# Patient Record
Sex: MALE | Race: WHITE | NOT HISPANIC OR LATINO | URBAN - METROPOLITAN AREA
[De-identification: names, ages, dates, MRNs, and addresses within clinical notes are randomized per-mention and may not be internally consistent; named-entity substitution may affect disease eponyms.]

---

## 2024-03-10 ENCOUNTER — APPOINTMENT (OUTPATIENT)
Dept: RADIOLOGY | Facility: HOSPITAL | Age: 79
DRG: 280 | End: 2024-03-10
Payer: MEDICARE

## 2024-03-10 ENCOUNTER — APPOINTMENT (OUTPATIENT)
Dept: CARDIOLOGY | Facility: HOSPITAL | Age: 79
DRG: 280 | End: 2024-03-10
Payer: MEDICARE

## 2024-03-10 ENCOUNTER — HOSPITAL ENCOUNTER (INPATIENT)
Facility: HOSPITAL | Age: 79
LOS: 2 days | Discharge: HOME | DRG: 280 | End: 2024-03-13
Attending: EMERGENCY MEDICINE | Admitting: INTERNAL MEDICINE
Payer: MEDICARE

## 2024-03-10 DIAGNOSIS — I21.4 NON-STEMI (NON-ST ELEVATED MYOCARDIAL INFARCTION) (MULTI): ICD-10-CM

## 2024-03-10 DIAGNOSIS — I50.43 CHF (CONGESTIVE HEART FAILURE), NYHA CLASS I, ACUTE ON CHRONIC, COMBINED (MULTI): Primary | ICD-10-CM

## 2024-03-10 DIAGNOSIS — I11.0 HEART FAILURE DUE TO HIGH BLOOD PRESSURE (MULTI): ICD-10-CM

## 2024-03-10 DIAGNOSIS — N18.4 CHRONIC RENAL IMPAIRMENT, STAGE 4 (SEVERE) (MULTI): ICD-10-CM

## 2024-03-10 DIAGNOSIS — I50.9 ACUTE HEART FAILURE, UNSPECIFIED HEART FAILURE TYPE (MULTI): ICD-10-CM

## 2024-03-10 DIAGNOSIS — I50.20 UNSPECIFIED SYSTOLIC (CONGESTIVE) HEART FAILURE (MULTI): ICD-10-CM

## 2024-03-10 DIAGNOSIS — N18.9 CHRONIC RENAL IMPAIRMENT, UNSPECIFIED CKD STAGE: ICD-10-CM

## 2024-03-10 DIAGNOSIS — I16.0 HYPERTENSIVE URGENCY: ICD-10-CM

## 2024-03-10 DIAGNOSIS — J18.9 PNEUMONIA DUE TO INFECTIOUS ORGANISM, UNSPECIFIED LATERALITY, UNSPECIFIED PART OF LUNG: ICD-10-CM

## 2024-03-10 LAB
ALBUMIN SERPL BCP-MCNC: 4.3 G/DL (ref 3.4–5)
ALP SERPL-CCNC: 41 U/L (ref 33–136)
ALT SERPL W P-5'-P-CCNC: 18 U/L (ref 10–52)
ANION GAP SERPL CALC-SCNC: 20 MMOL/L (ref 10–20)
APPEARANCE UR: CLEAR
AST SERPL W P-5'-P-CCNC: 23 U/L (ref 9–39)
BACTERIA #/AREA URNS AUTO: ABNORMAL /HPF
BASOPHILS # BLD AUTO: 0.03 X10*3/UL (ref 0–0.1)
BASOPHILS NFR BLD AUTO: 0.3 %
BILIRUB SERPL-MCNC: 0.4 MG/DL (ref 0–1.2)
BILIRUB UR STRIP.AUTO-MCNC: NEGATIVE MG/DL
BNP SERPL-MCNC: 2432 PG/ML (ref 0–99)
BUN SERPL-MCNC: 62 MG/DL (ref 6–23)
CALCIUM SERPL-MCNC: 8.6 MG/DL (ref 8.6–10.3)
CARDIAC TROPONIN I PNL SERPL HS: 3071 NG/L (ref 0–20)
CARDIAC TROPONIN I PNL SERPL HS: 3111 NG/L (ref 0–20)
CHLORIDE SERPL-SCNC: 100 MMOL/L (ref 98–107)
CHLORIDE UR-SCNC: 97 MMOL/L
CHLORIDE/CREATININE (MMOL/G) IN URINE: 326 MMOL/G CREAT (ref 23–275)
CO2 SERPL-SCNC: 19 MMOL/L (ref 21–32)
COLOR UR: ABNORMAL
CREAT SERPL-MCNC: 3.35 MG/DL (ref 0.5–1.3)
CREAT UR-MCNC: 29.8 MG/DL (ref 20–370)
EGFRCR SERPLBLD CKD-EPI 2021: 18 ML/MIN/1.73M*2
EOSINOPHIL # BLD AUTO: 0.13 X10*3/UL (ref 0–0.4)
EOSINOPHIL NFR BLD AUTO: 1.3 %
ERYTHROCYTE [DISTWIDTH] IN BLOOD BY AUTOMATED COUNT: 14 % (ref 11.5–14.5)
GLUCOSE SERPL-MCNC: 124 MG/DL (ref 74–99)
GLUCOSE UR STRIP.AUTO-MCNC: NEGATIVE MG/DL
HCT VFR BLD AUTO: 39.8 % (ref 41–52)
HGB BLD-MCNC: 12.7 G/DL (ref 13.5–17.5)
IMM GRANULOCYTES # BLD AUTO: 0.03 X10*3/UL (ref 0–0.5)
IMM GRANULOCYTES NFR BLD AUTO: 0.3 % (ref 0–0.9)
KETONES UR STRIP.AUTO-MCNC: NEGATIVE MG/DL
LACTATE SERPL-SCNC: 1.9 MMOL/L (ref 0.4–2)
LEUKOCYTE ESTERASE UR QL STRIP.AUTO: NEGATIVE
LYMPHOCYTES # BLD AUTO: 1.05 X10*3/UL (ref 0.8–3)
LYMPHOCYTES NFR BLD AUTO: 10.3 %
MCH RBC QN AUTO: 28.5 PG (ref 26–34)
MCHC RBC AUTO-ENTMCNC: 31.9 G/DL (ref 32–36)
MCV RBC AUTO: 89 FL (ref 80–100)
MONOCYTES # BLD AUTO: 0.61 X10*3/UL (ref 0.05–0.8)
MONOCYTES NFR BLD AUTO: 6 %
MUCOUS THREADS #/AREA URNS AUTO: ABNORMAL /LPF
NEUTROPHILS # BLD AUTO: 8.3 X10*3/UL (ref 1.6–5.5)
NEUTROPHILS NFR BLD AUTO: 81.8 %
NITRITE UR QL STRIP.AUTO: NEGATIVE
NRBC BLD-RTO: 0 /100 WBCS (ref 0–0)
PH UR STRIP.AUTO: 6 [PH]
PLATELET # BLD AUTO: 194 X10*3/UL (ref 150–450)
POTASSIUM SERPL-SCNC: 4.5 MMOL/L (ref 3.5–5.3)
POTASSIUM UR-SCNC: 29 MMOL/L
POTASSIUM/CREAT UR-RTO: 97 MMOL/G CREAT
PROT SERPL-MCNC: 7.9 G/DL (ref 6.4–8.2)
PROT UR STRIP.AUTO-MCNC: ABNORMAL MG/DL
RBC # BLD AUTO: 4.46 X10*6/UL (ref 4.5–5.9)
RBC # UR STRIP.AUTO: ABNORMAL /UL
RBC #/AREA URNS AUTO: ABNORMAL /HPF
SODIUM SERPL-SCNC: 134 MMOL/L (ref 136–145)
SODIUM UR-SCNC: 83 MMOL/L
SODIUM/CREAT UR-RTO: 279 MMOL/G CREAT
SP GR UR STRIP.AUTO: 1.01
UROBILINOGEN UR STRIP.AUTO-MCNC: <2 MG/DL
WBC # BLD AUTO: 10.2 X10*3/UL (ref 4.4–11.3)
WBC #/AREA URNS AUTO: ABNORMAL /HPF

## 2024-03-10 PROCEDURE — 84300 ASSAY OF URINE SODIUM: CPT | Performed by: EMERGENCY MEDICINE

## 2024-03-10 PROCEDURE — 76770 US EXAM ABDO BACK WALL COMP: CPT

## 2024-03-10 PROCEDURE — 99291 CRITICAL CARE FIRST HOUR: CPT | Performed by: EMERGENCY MEDICINE

## 2024-03-10 PROCEDURE — 2500000004 HC RX 250 GENERAL PHARMACY W/ HCPCS (ALT 636 FOR OP/ED): Performed by: EMERGENCY MEDICINE

## 2024-03-10 PROCEDURE — 81001 URINALYSIS AUTO W/SCOPE: CPT | Performed by: EMERGENCY MEDICINE

## 2024-03-10 PROCEDURE — 76770 US EXAM ABDO BACK WALL COMP: CPT | Performed by: RADIOLOGY

## 2024-03-10 PROCEDURE — 71046 X-RAY EXAM CHEST 2 VIEWS: CPT | Performed by: RADIOLOGY

## 2024-03-10 PROCEDURE — 2500000001 HC RX 250 WO HCPCS SELF ADMINISTERED DRUGS (ALT 637 FOR MEDICARE OP): Performed by: INTERNAL MEDICINE

## 2024-03-10 PROCEDURE — 96367 TX/PROPH/DG ADDL SEQ IV INF: CPT

## 2024-03-10 PROCEDURE — G0378 HOSPITAL OBSERVATION PER HR: HCPCS

## 2024-03-10 PROCEDURE — 83880 ASSAY OF NATRIURETIC PEPTIDE: CPT | Performed by: STUDENT IN AN ORGANIZED HEALTH CARE EDUCATION/TRAINING PROGRAM

## 2024-03-10 PROCEDURE — 93005 ELECTROCARDIOGRAM TRACING: CPT

## 2024-03-10 PROCEDURE — 2500000004 HC RX 250 GENERAL PHARMACY W/ HCPCS (ALT 636 FOR OP/ED): Performed by: INTERNAL MEDICINE

## 2024-03-10 PROCEDURE — 71046 X-RAY EXAM CHEST 2 VIEWS: CPT

## 2024-03-10 PROCEDURE — 85025 COMPLETE CBC W/AUTO DIFF WBC: CPT | Performed by: STUDENT IN AN ORGANIZED HEALTH CARE EDUCATION/TRAINING PROGRAM

## 2024-03-10 PROCEDURE — 71250 CT THORAX DX C-: CPT | Performed by: RADIOLOGY

## 2024-03-10 PROCEDURE — 71250 CT THORAX DX C-: CPT

## 2024-03-10 PROCEDURE — 36415 COLL VENOUS BLD VENIPUNCTURE: CPT | Performed by: STUDENT IN AN ORGANIZED HEALTH CARE EDUCATION/TRAINING PROGRAM

## 2024-03-10 PROCEDURE — 80053 COMPREHEN METABOLIC PANEL: CPT | Performed by: STUDENT IN AN ORGANIZED HEALTH CARE EDUCATION/TRAINING PROGRAM

## 2024-03-10 PROCEDURE — 96361 HYDRATE IV INFUSION ADD-ON: CPT

## 2024-03-10 PROCEDURE — 84484 ASSAY OF TROPONIN QUANT: CPT | Performed by: STUDENT IN AN ORGANIZED HEALTH CARE EDUCATION/TRAINING PROGRAM

## 2024-03-10 PROCEDURE — 96365 THER/PROPH/DIAG IV INF INIT: CPT

## 2024-03-10 PROCEDURE — 83605 ASSAY OF LACTIC ACID: CPT | Performed by: EMERGENCY MEDICINE

## 2024-03-10 PROCEDURE — 87040 BLOOD CULTURE FOR BACTERIA: CPT | Mod: AHULAB | Performed by: EMERGENCY MEDICINE

## 2024-03-10 PROCEDURE — 36415 COLL VENOUS BLD VENIPUNCTURE: CPT | Performed by: EMERGENCY MEDICINE

## 2024-03-10 PROCEDURE — 84484 ASSAY OF TROPONIN QUANT: CPT | Performed by: EMERGENCY MEDICINE

## 2024-03-10 PROCEDURE — 96372 THER/PROPH/DIAG INJ SC/IM: CPT

## 2024-03-10 PROCEDURE — 96375 TX/PRO/DX INJ NEW DRUG ADDON: CPT

## 2024-03-10 PROCEDURE — 2500000001 HC RX 250 WO HCPCS SELF ADMINISTERED DRUGS (ALT 637 FOR MEDICARE OP): Performed by: EMERGENCY MEDICINE

## 2024-03-10 RX ORDER — POLYETHYLENE GLYCOL 3350 17 G/17G
17 POWDER, FOR SOLUTION ORAL DAILY
Status: DISCONTINUED | OUTPATIENT
Start: 2024-03-10 | End: 2024-03-13 | Stop reason: HOSPADM

## 2024-03-10 RX ORDER — FUROSEMIDE 10 MG/ML
20 INJECTION INTRAMUSCULAR; INTRAVENOUS ONCE
Status: COMPLETED | OUTPATIENT
Start: 2024-03-10 | End: 2024-03-10

## 2024-03-10 RX ORDER — ENOXAPARIN SODIUM 100 MG/ML
30 INJECTION SUBCUTANEOUS EVERY 24 HOURS
Status: DISCONTINUED | OUTPATIENT
Start: 2024-03-10 | End: 2024-03-13 | Stop reason: HOSPADM

## 2024-03-10 RX ORDER — HYDRALAZINE HYDROCHLORIDE 20 MG/ML
5 INJECTION INTRAMUSCULAR; INTRAVENOUS EVERY 4 HOURS PRN
Status: DISCONTINUED | OUTPATIENT
Start: 2024-03-10 | End: 2024-03-13 | Stop reason: HOSPADM

## 2024-03-10 RX ORDER — NAPROXEN SODIUM 220 MG/1
81 TABLET, FILM COATED ORAL DAILY
Status: DISCONTINUED | OUTPATIENT
Start: 2024-03-10 | End: 2024-03-13 | Stop reason: HOSPADM

## 2024-03-10 RX ORDER — SODIUM CHLORIDE 9 MG/ML
25 INJECTION, SOLUTION INTRAVENOUS CONTINUOUS
Status: DISCONTINUED | OUTPATIENT
Start: 2024-03-10 | End: 2024-03-11

## 2024-03-10 RX ADMIN — HYDRALAZINE HYDROCHLORIDE 5 MG: 20 INJECTION INTRAMUSCULAR; INTRAVENOUS at 16:12

## 2024-03-10 RX ADMIN — ENOXAPARIN SODIUM 30 MG: 30 INJECTION SUBCUTANEOUS at 16:40

## 2024-03-10 RX ADMIN — ASPIRIN 81 MG CHEWABLE TABLET 81 MG: 81 TABLET CHEWABLE at 18:11

## 2024-03-10 RX ADMIN — CEFTRIAXONE 2 G: 2 INJECTION, POWDER, FOR SOLUTION INTRAMUSCULAR; INTRAVENOUS at 16:40

## 2024-03-10 RX ADMIN — AZITHROMYCIN DIHYDRATE 500 MG: 500 INJECTION, POWDER, LYOPHILIZED, FOR SOLUTION INTRAVENOUS at 17:15

## 2024-03-10 RX ADMIN — SODIUM CHLORIDE 25 ML/HR: 9 INJECTION, SOLUTION INTRAVENOUS at 16:40

## 2024-03-10 RX ADMIN — NITROGLYCERIN 0.5 INCH: 20 OINTMENT TOPICAL at 14:13

## 2024-03-10 RX ADMIN — FUROSEMIDE 20 MG: 10 INJECTION, SOLUTION INTRAMUSCULAR; INTRAVENOUS at 14:13

## 2024-03-10 ASSESSMENT — COGNITIVE AND FUNCTIONAL STATUS - GENERAL
MOVING TO AND FROM BED TO CHAIR: A LITTLE
STANDING UP FROM CHAIR USING ARMS: A LITTLE
WALKING IN HOSPITAL ROOM: A LITTLE
MOBILITY SCORE: 19
DRESSING REGULAR LOWER BODY CLOTHING: A LITTLE
PERSONAL GROOMING: A LITTLE
HELP NEEDED FOR BATHING: A LITTLE
DAILY ACTIVITIY SCORE: 18
DRESSING REGULAR UPPER BODY CLOTHING: A LITTLE
EATING MEALS: A LITTLE
TURNING FROM BACK TO SIDE WHILE IN FLAT BAD: A LITTLE
TOILETING: A LITTLE
CLIMB 3 TO 5 STEPS WITH RAILING: A LITTLE

## 2024-03-10 ASSESSMENT — PAIN SCALES - GENERAL
PAINLEVEL_OUTOF10: 0 - NO PAIN
PAINLEVEL_OUTOF10: 3

## 2024-03-10 ASSESSMENT — PAIN DESCRIPTION - PAIN TYPE: TYPE: CHRONIC PAIN

## 2024-03-10 ASSESSMENT — PAIN - FUNCTIONAL ASSESSMENT: PAIN_FUNCTIONAL_ASSESSMENT: 0-10

## 2024-03-10 ASSESSMENT — COLUMBIA-SUICIDE SEVERITY RATING SCALE - C-SSRS
2. HAVE YOU ACTUALLY HAD ANY THOUGHTS OF KILLING YOURSELF?: NO
6. HAVE YOU EVER DONE ANYTHING, STARTED TO DO ANYTHING, OR PREPARED TO DO ANYTHING TO END YOUR LIFE?: NO
1. IN THE PAST MONTH, HAVE YOU WISHED YOU WERE DEAD OR WISHED YOU COULD GO TO SLEEP AND NOT WAKE UP?: NO

## 2024-03-10 ASSESSMENT — PAIN DESCRIPTION - LOCATION: LOCATION: KNEE

## 2024-03-10 ASSESSMENT — PAIN DESCRIPTION - ORIENTATION: ORIENTATION: LEFT

## 2024-03-10 NOTE — ED PROVIDER NOTES
HPI   Chief Complaint   Patient presents with    Hypertension    Shortness of Breath       HPI: 78-year-old male arrives with exertional shortness of breath it has been worsening over the last week he denies chest pain jaw pain arm pain he has had a mild cough.  Chest x-ray is read as a right basilar possible infiltrate.  With the tachycardia and mild cough I added septic orders and antibiotics this was after his confirmed heart failure and elevated troponin.  I also involved cardiology and nephrology because of his elevated troponin and renal status.  Both have been kind enough to accept these consults.  Patient seems to be resting comfortably but does get short of breath with any exertion.  I see that the admitting physicians have ordered a CAT scan he does have renal insufficiency but that result is not available upon his admission to the hospital.      PMH: Heart failure hypertension noncompliant with medication coronary artery disease metabolic syndrome    SH never used tobacco social alcohol  FH negative for both heart disease Diabetes  ROS  General Appears in distress  HEENT: No sore throat, No Visual Loss, No Headache, No Ear Pain  Neck: Denies neck pain  Chest: No chest pain, no pleuritic pain, no chest wall injury  Pulmonary: Positive exertional shortness of breath and mild cough, No Sputum production, No Wheezing  GI: No abdominal pain, no nausea or vomiting, no diarrhea.  : No dysuria, no frequency, no hematuria.  Extremities: No musculoskeletal pain, normal ambulation, no paresthesia.  Psych: Normal interaction, no anxiety, no depression, no suicidal ideation  Skin: No rashes    ROS is otherwise negative    PE: General: Appears in distress        HEENT: Throat is moist without exudate, midline uvula dentate intact, Tms clear with normal anatomy.        Neck: Supple non tender        Chest bilateral lower lobe Rales good AE, no wheezing, positive bilateral lower lobe rales, no rhonci positive JVD         CVA: RRR S1S2 no S3S4 or murmur        ABD: W/S/NT no HSM, no pulsatile masses, good bowel sounds        Extremities: Excellent distal pulses, brisk capillary refill. Full ROM        Psych: Normal interactions with no signs of depression  or suicidal ideation.        Neuro: Alert and oriented, moves all and feels all.    MDM:78-year-old male arrives with exertional shortness of breath it has been worsening over the last week he denies chest pain jaw pain arm pain he has had a mild cough.  Chest x-ray is read as a right basilar possible infiltrate.  With the tachycardia and mild cough I added septic orders and antibiotics this was after his confirmed heart failure and elevated troponin.  I also involved cardiology and nephrology because of his elevated troponin and renal status.  Both have been kind enough to accept these consults.  Patient seems to be resting comfortably but does get short of breath with any exertion.  I see that the admitting physicians have ordered a CAT scan he does have renal insufficiency but that result is not available upon his admission to the hospital.    EKG read by me reviewed by me sinus tachycardia occasional PACs good R wave progression no demonstratable ST segment elevation.                            [place] Coma Scale Score: 15                     Patient History   Past Medical History:   Diagnosis Date    Hypertension     Occlusion and stenosis of unspecified carotid artery     Carotid atherosclerosis    Other conditions influencing health status     Coronary Artery Disease    Personal history of other diseases of the circulatory system     History of congestive heart disease    Personal history of other diseases of the circulatory system     History of hypertension    Personal history of other endocrine, nutritional and metabolic disease     History of hyperlipidemia     History reviewed. No pertinent surgical history.  No family history on file.  Social History     Tobacco Use     Smoking status: Never    Smokeless tobacco: Never   Substance Use Topics    Alcohol use: Not on file    Drug use: Not on file       Physical Exam   ED Triage Vitals [03/10/24 1322]   Temperature Heart Rate Respirations BP   36.5 °C (97.7 °F) (!) 115 20 (!) 207/114      Pulse Ox Temp src Heart Rate Source Patient Position   95 % -- -- --      BP Location FiO2 (%)     -- --       Physical Exam    ED Course & MDM   Diagnoses as of 03/12/24 0828   Non-STEMI (non-ST elevated myocardial infarction) (CMS/[facility])   Acute heart failure, unspecified heart failure type (CMS/[facility])   Chronic renal impairment, unspecified CKD stage   Hypertensive urgency   Pneumonia due to infectious organism, unspecified laterality, unspecified part of lung   CHF (congestive heart failure), NYHA class I, acute on chronic, combined (CMS/[facility])       Medical Decision Making      Procedure  Critical Care    Performed by: Cruz Durham MD  Authorized by: Cruz Durham MD    Critical care provider statement:     Critical care time (minutes):  40    Critical care was necessary to treat or prevent imminent or life-threatening deterioration of the following conditions:  Cardiac failure, renal failure and sepsis    Critical care was time spent personally by me on the following activities:  Blood draw for specimens, development of treatment plan with patient or surrogate, discussions with consultants, discussions with primary provider, evaluation of patient's response to treatment, examination of patient, ordering and performing treatments and interventions, ordering and review of laboratory studies, ordering and review of radiographic studies, pulse oximetry, re-evaluation of patient's condition and review of old charts    Care discussed with: admitting provider         Cruz Durham MD  03/10/24 1627       Cruz Durham MD  03/10/24 1627       Cruz Durham MD  03/12/24 0830

## 2024-03-10 NOTE — Clinical Note
900cc clear yellow fluid removed during right thoracentesis.  Uneventful.  Pt tolerated procedure well.  Report called to floor nurse.  Labs sent.

## 2024-03-10 NOTE — ED TRIAGE NOTES
PT PRESENTS TO THE ED FOR HIGH BLOOD PRESSURE AND SOB WITH MOVEMENT. PT STATES THAT HE WAS TOLD ABOUT 20 YEARS AGO HE HAD HTN BUT HAS NOT FOLLOWED UP WITH ANYONE SINCE. PT STATES THAT HE DOES NOT TAKE ORAL MEDICATIONS THAT ARE PRESCRIBED RATHER SUPPLEMENTS. PT ENDORSES THAT HE TAKES 325MG ASPIRIN DAILY. PT STATES THAT THIS HAS BEEN GOING ON FOR TWO WEEKS NOW. PT STATES THAT HE THOUGHT IT WAS JUST A BUG BUT IT HAS HOT GOTTEN BETTER. PT DENIES CHEST PAIN. PT DENIES FEVERS OR CHILLS. PT DENIES HEADACHES.

## 2024-03-10 NOTE — CONSULTS
"Reason For Consult  IVIS on top of CKD,  Hypertensive urgency.    History Of Present Illness  Tom Haas is a 78 y.o. male presenting with high blood pressure readings and shortness of breath with movement.  Patient had hypertension for more than 20 years but has not follow-up with any primary care physician similarly had problems with his heart \"\" angina or MI nonspecific again with no cardiology follow-up.  Patient denies family history of kidney disease, or nephrolithiasis.  Chest x-ray and emergency room was positive for right basilar possible infiltrate.  No history of hematuria noted by the patient .  Patient had extremely high blood pressure readings above 180 mmHg and emergency department.  And being worked up as hypertensive urgency, with endorgan damage  Past Medical History  He has a past medical history of Hypertension, Occlusion and stenosis of unspecified carotid artery, Other conditions influencing health status, Personal history of other diseases of the circulatory system, Personal history of other diseases of the circulatory system, and Personal history of other endocrine, nutritional and metabolic disease.    Surgical History  He has no past surgical history on file.     Social History  He reports that he has never smoked. He has never used smokeless tobacco. No history on file for alcohol use and drug use.    Family History  No family history on file.     Allergies  Patient has no known allergies.    Review of Systems  All systems were reviewed     Physical Exam    General appearance: Awake and alert in no acute distress with daughter at bedside  Head and ENT; normocephalic/atraumatic/supple neck/no JVD  Lungs: Clear to auscultation with with rhonchi in the lower lobes  Heart: RRR with systolic ejection murmur grade 2/6 to 3/6 with midsternal.  Abdomen: Soft no organomegaly no tenderness  Extremities: No edema  Neurologic: Physiologic         I&O 24HR    Intake/Output Summary (Last 24 hours) at " "3/10/2024 1743  Last data filed at 3/10/2024 1715  Gross per 24 hour   Intake 50 ml   Output --   Net 50 ml       Vitals 24HR  Heart Rate:  []   Temperature:  [36.5 °C (97.7 °F)]   Respirations:  [19-22]   BP: (179-207)/(102-132)   Height:  [165.1 cm (5' 5\")]   Weight:  [72.6 kg (160 lb)]   Pulse Ox:  [95 %-99 %]         Relevant Results  Results from last 7 days   Lab Units 03/10/24  1400   SODIUM mmol/L 134*   POTASSIUM mmol/L 4.5   CHLORIDE mmol/L 100   CO2 mmol/L 19*   BUN mg/dL 62*   CREATININE mg/dL 3.35*   GLUCOSE mg/dL 124*   CALCIUM mg/dL 8.6      Results from last 7 days   Lab Units 03/10/24  1400   WBC AUTO x10*3/uL 10.2   HEMOGLOBIN g/dL 12.7*   HEMATOCRIT % 39.8*   PLATELETS AUTO x10*3/uL 194    No intake/output data recorded.  I/O this shift:  In: 50 [IV Piggyback:50]  Out: -    XR chest 2 views    Result Date: 3/10/2024  Interpreted By:  Tre Bronson, STUDY: XR CHEST 2 VIEWS;  3/10/2024 1:46 pm   INDICATION: Signs/Symptoms:SOB.   COMPARISON: None.   ACCESSION NUMBER(S): [id number]   ORDERING CLINICIAN: DWAIN SIBLEY   FINDINGS:     CARDIOMEDIASTINAL SILHOUETTE: Cardiomediastinal silhouette is unchanged.   LUNGS: Right basilar airspace opacification. No pneumothorax. Small right-sided effusion.   ABDOMEN: No remarkable upper abdominal findings.   BONES: No acute osseous changes.   Remaining findings appear stable since prior comparison radiograph.       1.  Right basilar airspace opacification which may reflect pneumonia in the appropriate clinical setting     Signed by: Tre Bronson 3/10/2024 2:01 PM Dictation workstation:   [id number]       Assessment/Plan   1.  Hypertensive urgency,  Continue current treatments,  May need to be on Cardene IV drip if control remains poor.  Will check renin aldosterone levels, and catecholamines.  2.  IVIS on top of CKD  Will check urine and other medical evaluations.  3.  Pneumonia, started antibiotics  4.  Non-ST SID, cardiology to will be " following.    Continue current management, will follow closely with physical exam and daily examination with lab s          Principal Problem:    Hypertensive urgency  Active Problems:    Non-STEMI (non-ST elevated myocardial infarction) (CMS/HCC)    Acute heart failure (CMS/[facility])    Chronic renal impairment    Pneumonia due to infectious organism      I spent 54 minutes in the professional and overall care of this patient.      Christian Johnson MD

## 2024-03-10 NOTE — H&P
PRIMARY CARE PHYSICIAN:  Kyler Corbin MD ADMITTING PHYSICIAN No admitting provider for patient encounter.   MRN# [id number]   Admission Date: 3/10/2024     Subjective   CHIEF COMPLAINT: Shortness of breath.    HISTORY OF PRESENT ILLNESS Tom Haas is a 78 y.o. male with a history of untreated hypertension presented with shortness of breath on mild exertion for 3 weeks. He denies any chest pain orthopnea or ankle swelling. No cough.    Past Medical history     Past Medical History:   Diagnosis Date    Hypertension     Occlusion and stenosis of unspecified carotid artery     Carotid atherosclerosis    Other conditions influencing health status     Coronary Artery Disease    Personal history of other diseases of the circulatory system     History of congestive heart disease    Personal history of other diseases of the circulatory system     History of hypertension    Personal history of other endocrine, nutritional and metabolic disease     History of hyperlipidemia        Past Surgical history  History reviewed. No pertinent surgical history.  Family history non contributory.    Social History     Socioeconomic History    Marital status:      Spouse name: None    Number of children: None    Years of education: None    Highest education level: None   Occupational History    None   Tobacco Use    Smoking status: Never    Smokeless tobacco: Never   Substance and Sexual Activity    Alcohol use: None    Drug use: None    Sexual activity: None   Other Topics Concern    None   Social History Narrative    None     Social Determinants of Health     Financial Resource Strain: Not on file   Food Insecurity: Not on file   Transportation Needs: Not on file   Physical Activity: Not on file   Stress: Not on file   Social Connections: Not on file   Intimate Partner Violence: Not on file   Housing Stability: Not on file       Medications  (Not in a hospital admission)       No Known  "Allergies    Complete Review of symptoms  GENERAL: No, fever, or chills  HEENT: No, blurred vision, vertigo, or hearing loss  CARDIAC: See HPI  RESPIRATORY: No, cough, or hemoptysis  GI: No nausea, vomiting, diarrhea, or constipation  : No, dysuria, or frequency  SKIN: no rash, itching discoloration, jaundice or rash  ENDOCRINE: no or polydipsia  PSYCH: No anxiety, mood disorder hallucinations or psychiatric symptoms  NEURO: No, blurred vision, slurred speech, vertigo, headache, or loss of balance    Objective     PHYSICAL EXAM:  Patient Vitals for the past 24 hrs:   BP Temp Pulse Resp SpO2 Height Weight   03/10/24 1530 (!) 181/102 -- (!) 101 19 99 % -- --   03/10/24 1500 (!) 198/108 -- (!) 106 (!) 22 98 % -- --   03/10/24 1445 (!) 198/108 -- (!) 116 (!) 21 98 % -- --   03/10/24 1401 (!) 200/102 -- (!) 104 19 98 % -- --   03/10/24 1322 (!) 207/114 36.5 °C (97.7 °F) (!) 115 20 95 % 1.651 m (5' 5\") 72.6 kg (160 lb)     Blood pressure (!) 181/102, pulse (!) 101, temperature 36.5 °C (97.7 °F), resp. rate 19, height 1.651 m (5' 5\"), weight 72.6 kg (160 lb), SpO2 99 %.  Patient Vitals for the past 24 hrs:   BP Temp Pulse Resp SpO2 Height Weight   03/10/24 1530 (!) 181/102 -- (!) 101 19 99 % -- --   03/10/24 1500 (!) 198/108 -- (!) 106 (!) 22 98 % -- --   03/10/24 1445 (!) 198/108 -- (!) 116 (!) 21 98 % -- --   03/10/24 1401 (!) 200/102 -- (!) 104 19 98 % -- --   03/10/24 1322 (!) 207/114 36.5 °C (97.7 °F) (!) 115 20 95 % 1.651 m (5' 5\") 72.6 kg (160 lb)     Body mass index is 26.63 kg/m².  GENERAL: alert, cooperative, or no distress  SKIN: no rashes  OROPHARYNX:   NECK: supple, no thyromegaly, JVP within normal limits  LUNGS:  not in respiratory distress, respiratory rate normal, clear to auscultation  CARDIAC: rate regular and regular rhythm, normal S1 and S2, no murmur, rub, or gallop heard.  ABDOMEN: Soft, non-tender, normal bowel sounds; no bruits, organomegaly or masses.  EXTREMETIES: No edema  NEURO: Alert and " oriented x 3,   ., reflexes normal and symmetric, strength and  sensation grossly normal    DATA:   Diagnostic tests reviewed for today's visit:    Most recent  labs and imaging results  Results for orders placed or performed during the hospital encounter of 03/10/24 (from the past 96 hour(s))   CBC with Differential   Result Value Ref Range    WBC 10.2 4.4 - 11.3 x10*3/uL    nRBC 0.0 0.0 - 0.0 /100 WBCs    RBC 4.46 (L) 4.50 - 5.90 x10*6/uL    Hemoglobin 12.7 (L) 13.5 - 17.5 g/dL    Hematocrit 39.8 (L) 41.0 - 52.0 %    MCV 89 80 - 100 fL    MCH 28.5 26.0 - 34.0 pg    MCHC 31.9 (L) 32.0 - 36.0 g/dL    RDW 14.0 11.5 - 14.5 %    Platelets 194 150 - 450 x10*3/uL    Neutrophils % 81.8 40.0 - 80.0 %    Immature Granulocytes %, Automated 0.3 0.0 - 0.9 %    Lymphocytes % 10.3 13.0 - 44.0 %    Monocytes % 6.0 2.0 - 10.0 %    Eosinophils % 1.3 0.0 - 6.0 %    Basophils % 0.3 0.0 - 2.0 %    Neutrophils Absolute 8.30 (H) 1.60 - 5.50 x10*3/uL    Immature Granulocytes Absolute, Automated 0.03 0.00 - 0.50 x10*3/uL    Lymphocytes Absolute 1.05 0.80 - 3.00 x10*3/uL    Monocytes Absolute 0.61 0.05 - 0.80 x10*3/uL    Eosinophils Absolute 0.13 0.00 - 0.40 x10*3/uL    Basophils Absolute 0.03 0.00 - 0.10 x10*3/uL   Comprehensive Metabolic Panel   Result Value Ref Range    Glucose 124 (H) 74 - 99 mg/dL    Sodium 134 (L) 136 - 145 mmol/L    Potassium 4.5 3.5 - 5.3 mmol/L    Chloride 100 98 - 107 mmol/L    Bicarbonate 19 (L) 21 - 32 mmol/L    Anion Gap 20 10 - 20 mmol/L    Urea Nitrogen 62 (H) 6 - 23 mg/dL    Creatinine 3.35 (H) 0.50 - 1.30 mg/dL    eGFR 18 (L) >60 mL/min/1.73m*2    Calcium 8.6 8.6 - 10.3 mg/dL    Albumin 4.3 3.4 - 5.0 g/dL    Alkaline Phosphatase 41 33 - 136 U/L    Total Protein 7.9 6.4 - 8.2 g/dL    AST 23 9 - 39 U/L    Bilirubin, Total 0.4 0.0 - 1.2 mg/dL    ALT 18 10 - 52 U/L   Brain Natriuretic Peptide   Result Value Ref Range    BNP 2,432 (H) 0 - 99 pg/mL   Troponin I, High Sensitivity   Result Value Ref Range     Troponin I, High Sensitivity 3,071 (HH) 0 - 20 ng/L   Troponin I, High Sensitivity   Result Value Ref Range    Troponin I, High Sensitivity 3,111 (HH) 0 - 20 ng/L        XR chest 2 views    Result Date: 3/10/2024  Interpreted By:  Tre Bronson, STUDY: XR CHEST 2 VIEWS;  3/10/2024 1:46 pm   INDICATION: Signs/Symptoms:SOB.   COMPARISON: None.   ACCESSION NUMBER(S): [id number]   ORDERING CLINICIAN: DWAIN SIBLEY   FINDINGS:     CARDIOMEDIASTINAL SILHOUETTE: Cardiomediastinal silhouette is unchanged.   LUNGS: Right basilar airspace opacification. No pneumothorax. Small right-sided effusion.   ABDOMEN: No remarkable upper abdominal findings.   BONES: No acute osseous changes.   Remaining findings appear stable since prior comparison radiograph.       1.  Right basilar airspace opacification which may reflect pneumonia in the appropriate clinical setting     Signed by: Tre Bronson 3/10/2024 2:01 PM Dictation workstation:   [id number]     [unfilled]   Medication and Non-Pharmacologic VTE Prophylaxis/Anticoagulants   Last Anticoag Admin            No anticoagulants administered    No unadministered anticoagulant orders found.            Assessment/Plan     Tom Haas  has    Active Problems:    Non-STEMI (non-ST elevated myocardial infarction) (CMS/HCC)    Acute heart failure (CMS/HCC)    Chronic renal impairment    Hypertensive urgency    Pleural effusion vs Pneumonia    ASA, Statin, Cardiology consult    Echocardiogram, Lasix  Will get an Renal US  Add hydralazine  Will get a CT chest, empiric antibiotics.   Other Hospital problems        Abnormal findings not addressed during hospitalization, but require out patient follow up. None        I spent 75 minutes taking history and examining Tom Haas, reviewing the labs & imaging results, reviewing past hospital encounters,  speaking with & reviewing notes from emergency room physician.  SIGNATURE: Jerson Triplett MD PATIENT NAME: Tom Haas   DATE: March  10, 2024 MRN: [id number]   TIME: 3:45 PM

## 2024-03-10 NOTE — H&P (VIEW-ONLY)
"Reason For Consult  IVIS on top of CKD,  Hypertensive urgency.    History Of Present Illness  Tom Haas is a 78 y.o. male presenting with high blood pressure readings and shortness of breath with movement.  Patient had hypertension for more than 20 years but has not follow-up with any primary care physician similarly had problems with his heart \"\" angina or MI nonspecific again with no cardiology follow-up.  Patient denies family history of kidney disease, or nephrolithiasis.  Chest x-ray and emergency room was positive for right basilar possible infiltrate.  No history of hematuria noted by the patient .  Patient had extremely high blood pressure readings above 180 mmHg and emergency department.  And being worked up as hypertensive urgency, with endorgan damage  Past Medical History  He has a past medical history of Hypertension, Occlusion and stenosis of unspecified carotid artery, Other conditions influencing health status, Personal history of other diseases of the circulatory system, Personal history of other diseases of the circulatory system, and Personal history of other endocrine, nutritional and metabolic disease.    Surgical History  He has no past surgical history on file.     Social History  He reports that he has never smoked. He has never used smokeless tobacco. No history on file for alcohol use and drug use.    Family History  No family history on file.     Allergies  Patient has no known allergies.    Review of Systems  All systems were reviewed     Physical Exam    General appearance: Awake and alert in no acute distress with daughter at bedside  Head and ENT; normocephalic/atraumatic/supple neck/no JVD  Lungs: Clear to auscultation with with rhonchi in the lower lobes  Heart: RRR with systolic ejection murmur grade 2/6 to 3/6 with midsternal.  Abdomen: Soft no organomegaly no tenderness  Extremities: No edema  Neurologic: Physiologic         I&O 24HR    Intake/Output Summary (Last 24 hours) at " "3/10/2024 1743  Last data filed at 3/10/2024 1715  Gross per 24 hour   Intake 50 ml   Output --   Net 50 ml       Vitals 24HR  Heart Rate:  []   Temperature:  [36.5 °C (97.7 °F)]   Respirations:  [19-22]   BP: (179-207)/(102-132)   Height:  [165.1 cm (5' 5\")]   Weight:  [72.6 kg (160 lb)]   Pulse Ox:  [95 %-99 %]         Relevant Results  Results from last 7 days   Lab Units 03/10/24  1400   SODIUM mmol/L 134*   POTASSIUM mmol/L 4.5   CHLORIDE mmol/L 100   CO2 mmol/L 19*   BUN mg/dL 62*   CREATININE mg/dL 3.35*   GLUCOSE mg/dL 124*   CALCIUM mg/dL 8.6      Results from last 7 days   Lab Units 03/10/24  1400   WBC AUTO x10*3/uL 10.2   HEMOGLOBIN g/dL 12.7*   HEMATOCRIT % 39.8*   PLATELETS AUTO x10*3/uL 194    No intake/output data recorded.  I/O this shift:  In: 50 [IV Piggyback:50]  Out: -    XR chest 2 views    Result Date: 3/10/2024  Interpreted By:  Tre Bronson, STUDY: XR CHEST 2 VIEWS;  3/10/2024 1:46 pm   INDICATION: Signs/Symptoms:SOB.   COMPARISON: None.   ACCESSION NUMBER(S): [id number]   ORDERING CLINICIAN: DWAIN SIBLEY   FINDINGS:     CARDIOMEDIASTINAL SILHOUETTE: Cardiomediastinal silhouette is unchanged.   LUNGS: Right basilar airspace opacification. No pneumothorax. Small right-sided effusion.   ABDOMEN: No remarkable upper abdominal findings.   BONES: No acute osseous changes.   Remaining findings appear stable since prior comparison radiograph.       1.  Right basilar airspace opacification which may reflect pneumonia in the appropriate clinical setting     Signed by: Tre Bronson 3/10/2024 2:01 PM Dictation workstation:   [id number]       Assessment/Plan   1.  Hypertensive urgency,  Continue current treatments,  May need to be on Cardene IV drip if control remains poor.  Will check renin aldosterone levels, and catecholamines.  2.  IVIS on top of CKD  Will check urine and other medical evaluations.  3.  Pneumonia, started antibiotics  4.  Non-ST SID, cardiology to will be " following.    Continue current management, will follow closely with physical exam and daily examination with lab s          Principal Problem:    Hypertensive urgency  Active Problems:    Non-STEMI (non-ST elevated myocardial infarction) (CMS/HCC)    Acute heart failure (CMS/[facility])    Chronic renal impairment    Pneumonia due to infectious organism      I spent 54 minutes in the professional and overall care of this patient.      Christian Johnson MD

## 2024-03-11 ENCOUNTER — APPOINTMENT (OUTPATIENT)
Dept: RADIOLOGY | Facility: HOSPITAL | Age: 79
DRG: 280 | End: 2024-03-11
Payer: MEDICARE

## 2024-03-11 ENCOUNTER — APPOINTMENT (OUTPATIENT)
Dept: CARDIOLOGY | Facility: HOSPITAL | Age: 79
DRG: 280 | End: 2024-03-11
Payer: MEDICARE

## 2024-03-11 PROBLEM — I50.43 CHF (CONGESTIVE HEART FAILURE), NYHA CLASS I, ACUTE ON CHRONIC, COMBINED (MULTI): Status: ACTIVE | Noted: 2024-03-11

## 2024-03-11 LAB
ANION GAP SERPL CALC-SCNC: 15 MMOL/L (ref 10–20)
AORTIC VALVE MEAN GRADIENT: 13 MMHG
AORTIC VALVE PEAK VELOCITY: 2.73 M/S
ATRIAL RATE: 115 BPM
AV PEAK GRADIENT: 29.8 MMHG
AVA (PEAK VEL): 0.89 CM2
AVA (VTI): 0.98 CM2
BASOPHILS NFR FLD MANUAL: 0 %
BLASTS NFR FLD MANUAL: 0 %
BUN SERPL-MCNC: 63 MG/DL (ref 6–23)
CALCIUM SERPL-MCNC: 7.9 MG/DL (ref 8.6–10.3)
CARDIAC TROPONIN I PNL SERPL HS: 2324 NG/L (ref 0–20)
CARDIAC TROPONIN I PNL SERPL HS: 2653 NG/L (ref 0–20)
CARDIAC TROPONIN I PNL SERPL HS: 4619 NG/L (ref 0–53)
CHLORIDE SERPL-SCNC: 102 MMOL/L (ref 98–107)
CHOLEST SERPL-MCNC: 173 MG/DL (ref 0–199)
CHOLESTEROL/HDL RATIO: 4.5
CLARITY FLD: CLEAR
CO2 SERPL-SCNC: 20 MMOL/L (ref 21–32)
COLOR FLD: NORMAL
CREAT SERPL-MCNC: 3.58 MG/DL (ref 0.5–1.3)
EGFRCR SERPLBLD CKD-EPI 2021: 17 ML/MIN/1.73M*2
EJECTION FRACTION APICAL 4 CHAMBER: 39.2
EJECTION FRACTION: 37 %
EOSINOPHIL NFR FLD MANUAL: 0 %
GLUCOSE SERPL-MCNC: 88 MG/DL (ref 74–99)
HDLC SERPL-MCNC: 38.1 MG/DL
HOLD SPECIMEN: NORMAL
IMMATURE GRANULOCYTES IN FLUID: 0 %
INR PPP: 1.2 (ref 0.9–1.1)
LDLC SERPL CALC-MCNC: 113 MG/DL
LEFT ATRIUM VOLUME AREA LENGTH INDEX BSA: 61.6 ML/M2
LEFT VENTRICLE INTERNAL DIMENSION DIASTOLE: 5 CM (ref 3.5–6)
LEFT VENTRICULAR OUTFLOW TRACT DIAMETER: 2.1 CM
LYMPHOCYTES NFR FLD MANUAL: 31 %
MITRAL VALVE E/A RATIO: 1.08
MONOS+MACROS NFR FLD MANUAL: 45 %
NEUTROPHILS NFR FLD MANUAL: 24 %
NON HDL CHOLESTEROL: 135 MG/DL (ref 0–149)
OTHER CELLS NFR FLD MANUAL: 0 %
P AXIS: 54 DEGREES
P OFFSET: 167 MS
P ONSET: 121 MS
PLASMA CELLS NFR FLD MANUAL: 0 %
POTASSIUM SERPL-SCNC: 4.3 MMOL/L (ref 3.5–5.3)
PR INTERVAL: 182 MS
PROT SERPL-MCNC: 6.2 G/DL (ref 6.4–8.2)
PROT UR-ACNC: 121 MG/DL (ref 5–25)
PROTHROMBIN TIME: 13.8 SECONDS (ref 9.8–12.8)
Q ONSET: 212 MS
QRS COUNT: 19 BEATS
QRS DURATION: 112 MS
QT INTERVAL: 328 MS
QTC CALCULATION(BAZETT): 453 MS
QTC FREDERICIA: 407 MS
R AXIS: -25 DEGREES
RBC # FLD AUTO: 2000 /UL
RIGHT VENTRICLE PEAK SYSTOLIC PRESSURE: 29.6 MMHG
SODIUM SERPL-SCNC: 133 MMOL/L (ref 136–145)
T AXIS: 152 DEGREES
T OFFSET: 376 MS
TOTAL CELLS COUNTED FLD: 100
TRICUSPID ANNULAR PLANE SYSTOLIC EXCURSION: 1.7 CM
TRIGL SERPL-MCNC: 108 MG/DL (ref 0–149)
VENTRICULAR RATE: 115 BPM
VLDL: 22 MG/DL (ref 0–40)
WBC # FLD AUTO: 408 /UL

## 2024-03-11 PROCEDURE — 85610 PROTHROMBIN TIME: CPT

## 2024-03-11 PROCEDURE — 93306 TTE W/DOPPLER COMPLETE: CPT | Performed by: INTERNAL MEDICINE

## 2024-03-11 PROCEDURE — 99223 1ST HOSP IP/OBS HIGH 75: CPT | Performed by: INTERNAL MEDICINE

## 2024-03-11 PROCEDURE — 32555 ASPIRATE PLEURA W/ IMAGING: CPT

## 2024-03-11 PROCEDURE — 87075 CULTR BACTERIA EXCEPT BLOOD: CPT | Mod: AHULAB | Performed by: INTERNAL MEDICINE

## 2024-03-11 PROCEDURE — 74176 CT ABD & PELVIS W/O CONTRAST: CPT | Performed by: RADIOLOGY

## 2024-03-11 PROCEDURE — 2500000004 HC RX 250 GENERAL PHARMACY W/ HCPCS (ALT 636 FOR OP/ED): Performed by: NURSE PRACTITIONER

## 2024-03-11 PROCEDURE — C1729 CATH, DRAINAGE: HCPCS

## 2024-03-11 PROCEDURE — 2500000001 HC RX 250 WO HCPCS SELF ADMINISTERED DRUGS (ALT 637 FOR MEDICARE OP): Performed by: NURSE PRACTITIONER

## 2024-03-11 PROCEDURE — 2500000005 HC RX 250 GENERAL PHARMACY W/O HCPCS

## 2024-03-11 PROCEDURE — 82042 OTHER SOURCE ALBUMIN QUAN EA: CPT | Mod: AHULAB | Performed by: INTERNAL MEDICINE

## 2024-03-11 PROCEDURE — 36415 COLL VENOUS BLD VENIPUNCTURE: CPT | Performed by: INTERNAL MEDICINE

## 2024-03-11 PROCEDURE — 2720000007 HC OR 272 NO HCPCS

## 2024-03-11 PROCEDURE — 0W993ZZ DRAINAGE OF RIGHT PLEURAL CAVITY, PERCUTANEOUS APPROACH: ICD-10-PCS

## 2024-03-11 PROCEDURE — 84165 PROTEIN E-PHORESIS SERUM: CPT | Performed by: PATHOLOGY

## 2024-03-11 PROCEDURE — 1200000002 HC GENERAL ROOM WITH TELEMETRY DAILY

## 2024-03-11 PROCEDURE — 82088 ASSAY OF ALDOSTERONE: CPT | Performed by: INTERNAL MEDICINE

## 2024-03-11 PROCEDURE — 82150 ASSAY OF AMYLASE: CPT | Mod: AHULAB | Performed by: INTERNAL MEDICINE

## 2024-03-11 PROCEDURE — 71045 X-RAY EXAM CHEST 1 VIEW: CPT | Performed by: RADIOLOGY

## 2024-03-11 PROCEDURE — 84166 PROTEIN E-PHORESIS/URINE/CSF: CPT | Performed by: PATHOLOGY

## 2024-03-11 PROCEDURE — 36415 COLL VENOUS BLD VENIPUNCTURE: CPT | Performed by: NURSE PRACTITIONER

## 2024-03-11 PROCEDURE — 83615 LACTATE (LD) (LDH) ENZYME: CPT | Mod: AHULAB | Performed by: INTERNAL MEDICINE

## 2024-03-11 PROCEDURE — 84155 ASSAY OF PROTEIN SERUM: CPT | Mod: AHULAB | Performed by: INTERNAL MEDICINE

## 2024-03-11 PROCEDURE — 84157 ASSAY OF PROTEIN OTHER: CPT | Mod: AHULAB | Performed by: INTERNAL MEDICINE

## 2024-03-11 PROCEDURE — 80061 LIPID PANEL: CPT | Performed by: INTERNAL MEDICINE

## 2024-03-11 PROCEDURE — 87015 SPECIMEN INFECT AGNT CONCNTJ: CPT | Mod: AHULAB | Performed by: INTERNAL MEDICINE

## 2024-03-11 PROCEDURE — 82945 GLUCOSE OTHER FLUID: CPT | Mod: AHULAB | Performed by: INTERNAL MEDICINE

## 2024-03-11 PROCEDURE — 86320 SERUM IMMUNOELECTROPHORESIS: CPT | Performed by: PATHOLOGY

## 2024-03-11 PROCEDURE — 71045 X-RAY EXAM CHEST 1 VIEW: CPT

## 2024-03-11 PROCEDURE — 84484 ASSAY OF TROPONIN QUANT: CPT | Performed by: NURSE PRACTITIONER

## 2024-03-11 PROCEDURE — 2500000001 HC RX 250 WO HCPCS SELF ADMINISTERED DRUGS (ALT 637 FOR MEDICARE OP): Performed by: INTERNAL MEDICINE

## 2024-03-11 PROCEDURE — 86334 IMMUNOFIX E-PHORESIS SERUM: CPT | Mod: AHULAB | Performed by: INTERNAL MEDICINE

## 2024-03-11 PROCEDURE — 84166 PROTEIN E-PHORESIS/URINE/CSF: CPT | Mod: AHULAB | Performed by: INTERNAL MEDICINE

## 2024-03-11 PROCEDURE — 84156 ASSAY OF PROTEIN URINE: CPT | Mod: AHULAB | Performed by: INTERNAL MEDICINE

## 2024-03-11 PROCEDURE — 89051 BODY FLUID CELL COUNT: CPT | Performed by: INTERNAL MEDICINE

## 2024-03-11 PROCEDURE — 32555 ASPIRATE PLEURA W/ IMAGING: CPT | Mod: ZK

## 2024-03-11 PROCEDURE — 80048 BASIC METABOLIC PNL TOTAL CA: CPT | Performed by: INTERNAL MEDICINE

## 2024-03-11 PROCEDURE — 74176 CT ABD & PELVIS W/O CONTRAST: CPT

## 2024-03-11 PROCEDURE — 82384 ASSAY THREE CATECHOLAMINES: CPT | Performed by: INTERNAL MEDICINE

## 2024-03-11 PROCEDURE — 2500000004 HC RX 250 GENERAL PHARMACY W/ HCPCS (ALT 636 FOR OP/ED): Performed by: INTERNAL MEDICINE

## 2024-03-11 PROCEDURE — 93306 TTE W/DOPPLER COMPLETE: CPT

## 2024-03-11 PROCEDURE — 84585 ASSAY OF URINE VMA: CPT | Performed by: INTERNAL MEDICINE

## 2024-03-11 PROCEDURE — 84484 ASSAY OF TROPONIN QUANT: CPT | Mod: AHULAB | Performed by: NURSE PRACTITIONER

## 2024-03-11 RX ORDER — ATORVASTATIN CALCIUM 20 MG/1
20 TABLET, FILM COATED ORAL NIGHTLY
Status: DISCONTINUED | OUTPATIENT
Start: 2024-03-11 | End: 2024-03-13 | Stop reason: HOSPADM

## 2024-03-11 RX ORDER — LIDOCAINE HYDROCHLORIDE 10 MG/ML
INJECTION, SOLUTION EPIDURAL; INFILTRATION; INTRACAUDAL; PERINEURAL
Status: COMPLETED | OUTPATIENT
Start: 2024-03-11 | End: 2024-03-11

## 2024-03-11 RX ORDER — AMLODIPINE BESYLATE 10 MG/1
10 TABLET ORAL DAILY
Status: DISCONTINUED | OUTPATIENT
Start: 2024-03-11 | End: 2024-03-12

## 2024-03-11 RX ORDER — FUROSEMIDE 10 MG/ML
80 INJECTION INTRAMUSCULAR; INTRAVENOUS
Status: DISCONTINUED | OUTPATIENT
Start: 2024-03-11 | End: 2024-03-12

## 2024-03-11 RX ADMIN — AZITHROMYCIN MONOHYDRATE 500 MG: 500 INJECTION, POWDER, LYOPHILIZED, FOR SOLUTION INTRAVENOUS at 17:25

## 2024-03-11 RX ADMIN — ENOXAPARIN SODIUM 30 MG: 30 INJECTION SUBCUTANEOUS at 17:43

## 2024-03-11 RX ADMIN — FUROSEMIDE 80 MG: 10 INJECTION, SOLUTION INTRAVENOUS at 18:23

## 2024-03-11 RX ADMIN — AMLODIPINE BESYLATE 10 MG: 10 TABLET ORAL at 12:04

## 2024-03-11 RX ADMIN — ATORVASTATIN CALCIUM 20 MG: 20 TABLET, FILM COATED ORAL at 20:42

## 2024-03-11 RX ADMIN — FUROSEMIDE 80 MG: 10 INJECTION, SOLUTION INTRAVENOUS at 12:04

## 2024-03-11 RX ADMIN — POLYETHYLENE GLYCOL 3350 17 G: 17 POWDER, FOR SOLUTION ORAL at 08:39

## 2024-03-11 RX ADMIN — CEFTRIAXONE 2 G: 2 INJECTION, POWDER, FOR SOLUTION INTRAMUSCULAR; INTRAVENOUS at 17:42

## 2024-03-11 RX ADMIN — PERFLUTREN 10 ML OF DILUTION: 6.52 INJECTION, SUSPENSION INTRAVENOUS at 15:08

## 2024-03-11 RX ADMIN — LIDOCAINE HYDROCHLORIDE 10 ML: 10 INJECTION, SOLUTION EPIDURAL; INFILTRATION; INTRACAUDAL; PERINEURAL at 16:14

## 2024-03-11 RX ADMIN — ASPIRIN 81 MG CHEWABLE TABLET 81 MG: 81 TABLET CHEWABLE at 08:39

## 2024-03-11 SDOH — SOCIAL STABILITY: SOCIAL INSECURITY: ARE THERE ANY APPARENT SIGNS OF INJURIES/BEHAVIORS THAT COULD BE RELATED TO ABUSE/NEGLECT?: NO

## 2024-03-11 SDOH — ECONOMIC STABILITY: INCOME INSECURITY: IN THE LAST 12 MONTHS, WAS THERE A TIME WHEN YOU WERE NOT ABLE TO PAY THE MORTGAGE OR RENT ON TIME?: NO

## 2024-03-11 SDOH — SOCIAL STABILITY: SOCIAL INSECURITY: DO YOU FEEL UNSAFE GOING BACK TO THE PLACE WHERE YOU ARE LIVING?: NO

## 2024-03-11 SDOH — SOCIAL STABILITY: SOCIAL INSECURITY: DO YOU FEEL ANYONE HAS EXPLOITED OR TAKEN ADVANTAGE OF YOU FINANCIALLY OR OF YOUR PERSONAL PROPERTY?: NO

## 2024-03-11 SDOH — ECONOMIC STABILITY: HOUSING INSECURITY
IN THE LAST 12 MONTHS, WAS THERE A TIME WHEN YOU DID NOT HAVE A STEADY PLACE TO SLEEP OR SLEPT IN A SHELTER (INCLUDING NOW)?: NO

## 2024-03-11 SDOH — SOCIAL STABILITY: SOCIAL INSECURITY: HAS ANYONE EVER THREATENED TO HURT YOUR FAMILY OR YOUR PETS?: NO

## 2024-03-11 SDOH — HEALTH STABILITY: MENTAL HEALTH: HOW MANY STANDARD DRINKS CONTAINING ALCOHOL DO YOU HAVE ON A TYPICAL DAY?: 1 OR 2

## 2024-03-11 SDOH — ECONOMIC STABILITY: TRANSPORTATION INSECURITY
IN THE PAST 12 MONTHS, HAS LACK OF TRANSPORTATION KEPT YOU FROM MEETINGS, WORK, OR FROM GETTING THINGS NEEDED FOR DAILY LIVING?: NO

## 2024-03-11 SDOH — HEALTH STABILITY: MENTAL HEALTH: HOW OFTEN DO YOU HAVE A DRINK CONTAINING ALCOHOL?: MONTHLY OR LESS

## 2024-03-11 SDOH — SOCIAL STABILITY: SOCIAL INSECURITY: ARE YOU OR HAVE YOU BEEN THREATENED OR ABUSED PHYSICALLY, EMOTIONALLY, OR SEXUALLY BY ANYONE?: NO

## 2024-03-11 SDOH — SOCIAL STABILITY: SOCIAL INSECURITY: HAVE YOU HAD THOUGHTS OF HARMING ANYONE ELSE?: NO

## 2024-03-11 SDOH — SOCIAL STABILITY: SOCIAL INSECURITY: DOES ANYONE TRY TO KEEP YOU FROM HAVING/CONTACTING OTHER FRIENDS OR DOING THINGS OUTSIDE YOUR HOME?: NO

## 2024-03-11 SDOH — SOCIAL STABILITY: SOCIAL INSECURITY: ABUSE: ADULT

## 2024-03-11 SDOH — ECONOMIC STABILITY: INCOME INSECURITY: HOW HARD IS IT FOR YOU TO PAY FOR THE VERY BASICS LIKE FOOD, HOUSING, MEDICAL CARE, AND HEATING?: NOT VERY HARD

## 2024-03-11 SDOH — SOCIAL STABILITY: SOCIAL INSECURITY: WERE YOU ABLE TO COMPLETE ALL THE BEHAVIORAL HEALTH SCREENINGS?: YES

## 2024-03-11 SDOH — ECONOMIC STABILITY: TRANSPORTATION INSECURITY
IN THE PAST 12 MONTHS, HAS THE LACK OF TRANSPORTATION KEPT YOU FROM MEDICAL APPOINTMENTS OR FROM GETTING MEDICATIONS?: NO

## 2024-03-11 ASSESSMENT — ENCOUNTER SYMPTOMS
NAUSEA: 0
HEMATURIA: 0
VOMITING: 0
ABDOMINAL PAIN: 0
DYSURIA: 0
DYSPNEA ON EXERTION: 1
FLANK PAIN: 0
SHORTNESS OF BREATH: 1

## 2024-03-11 ASSESSMENT — ACTIVITIES OF DAILY LIVING (ADL)
LACK_OF_TRANSPORTATION: NO
TOILETING: INDEPENDENT
LACK_OF_TRANSPORTATION: NO
GROOMING: INDEPENDENT
JUDGMENT_ADEQUATE_SAFELY_COMPLETE_DAILY_ACTIVITIES: YES
BATHING: INDEPENDENT
ADEQUATE_TO_COMPLETE_ADL: YES
WALKS IN HOME: INDEPENDENT
PATIENT'S MEMORY ADEQUATE TO SAFELY COMPLETE DAILY ACTIVITIES?: YES
HEARING - LEFT EAR: FUNCTIONAL
FEEDING YOURSELF: INDEPENDENT
DRESSING YOURSELF: INDEPENDENT
HEARING - RIGHT EAR: FUNCTIONAL

## 2024-03-11 ASSESSMENT — PAIN SCALES - GENERAL
PAINLEVEL_OUTOF10: 0 - NO PAIN

## 2024-03-11 ASSESSMENT — COGNITIVE AND FUNCTIONAL STATUS - GENERAL
MOBILITY SCORE: 24
TOILETING: A LITTLE
MOVING FROM LYING ON BACK TO SITTING ON SIDE OF FLAT BED WITH BEDRAILS: A LITTLE
PERSONAL GROOMING: A LITTLE
MOVING TO AND FROM BED TO CHAIR: A LITTLE
TURNING FROM BACK TO SIDE WHILE IN FLAT BAD: A LITTLE
EATING MEALS: A LITTLE
HELP NEEDED FOR BATHING: A LITTLE
STANDING UP FROM CHAIR USING ARMS: A LITTLE
WALKING IN HOSPITAL ROOM: A LITTLE
MOBILITY SCORE: 24
MOBILITY SCORE: 18
DAILY ACTIVITIY SCORE: 24
PATIENT BASELINE BEDBOUND: NO
DAILY ACTIVITIY SCORE: 24
DAILY ACTIVITIY SCORE: 24
DAILY ACTIVITIY SCORE: 18
DRESSING REGULAR LOWER BODY CLOTHING: A LITTLE
CLIMB 3 TO 5 STEPS WITH RAILING: A LITTLE
MOBILITY SCORE: 24
DRESSING REGULAR UPPER BODY CLOTHING: A LITTLE

## 2024-03-11 ASSESSMENT — PAIN SCALES - WONG BAKER: WONGBAKER_NUMERICALRESPONSE: NO HURT

## 2024-03-11 ASSESSMENT — LIFESTYLE VARIABLES
HOW OFTEN DO YOU HAVE A DRINK CONTAINING ALCOHOL: PATIENT DECLINED
AUDIT-C TOTAL SCORE: -1
PRESCIPTION_ABUSE_PAST_12_MONTHS: NO
SUBSTANCE_ABUSE_PAST_12_MONTHS: NO
HOW MANY STANDARD DRINKS CONTAINING ALCOHOL DO YOU HAVE ON A TYPICAL DAY: PATIENT DECLINED
AUDIT-C TOTAL SCORE: -1
HOW OFTEN DO YOU HAVE 6 OR MORE DRINKS ON ONE OCCASION: PATIENT DECLINED
SKIP TO QUESTIONS 9-10: 0

## 2024-03-11 ASSESSMENT — PATIENT HEALTH QUESTIONNAIRE - PHQ9
SUM OF ALL RESPONSES TO PHQ9 QUESTIONS 1 & 2: 0
2. FEELING DOWN, DEPRESSED OR HOPELESS: NOT AT ALL
1. LITTLE INTEREST OR PLEASURE IN DOING THINGS: NOT AT ALL

## 2024-03-11 ASSESSMENT — PAIN - FUNCTIONAL ASSESSMENT: PAIN_FUNCTIONAL_ASSESSMENT: 0-10

## 2024-03-11 NOTE — PROGRESS NOTES
Tom Haas is a 78 y.o. male on day 0 of admission presenting with Hypertensive urgency.      Subjective   Seen and examined. Sitting up at the edge of the bed.   Easy respirations on room air. Feels okay.  Chart/labs/meds/notes/imaging/VS reviewed.       Objective          Vitals 24HR  Heart Rate:  []   Temp:  [36.6 °C (97.9 °F)-36.8 °C (98.2 °F)]   Resp:  [17-26]   BP: (138-203)/()   SpO2:  [96 %-100 %]     Intake/Output last 3 Shifts:    Intake/Output Summary (Last 24 hours) at 3/11/2024 1507  Last data filed at 3/11/2024 1300  Gross per 24 hour   Intake 873.33 ml   Output 1000 ml   Net -126.67 ml       Physical Exam  GENERAL: alert, cooperative, pleasant, in no acute distress  SKIN: warm, dry  NECK: Mildly elevated JVP  CARDIAC: Regular rate and rhythm no murmurs  CHEST: Normal respiratory efforts, lungs clear to auscultation bilaterally.   ABDOMEN: soft, nondistended  EXTREMITIES: no lower extremity edema  NEURO: Alert and oriented x 3. Non focal.      Scheduled Medications  amLODIPine, 10 mg, oral, Daily  aspirin, 81 mg, oral, Daily  atorvastatin, 20 mg, oral, Nightly  azithromycin, 500 mg, intravenous, q24h  cefTRIAXone, 2 g, intravenous, q24h  enoxaparin, 30 mg, subcutaneous, q24h  furosemide, 80 mg, intravenous, 2 times per day  perflutren lipid microspheres, 0.5-10 mL of dilution, intravenous, Once in imaging  perflutren protein A microsphere, 0.5 mL, intravenous, Once in imaging  polyethylene glycol, 17 g, oral, Daily  sulfur hexafluoride microsphr, 2 mL, intravenous, Once in imaging      Continuous medications       PRN medications: hydrALAZINE     Relevant Results  Results from last 7 days   Lab Units 03/10/24  1400   WBC AUTO x10*3/uL 10.2   HEMOGLOBIN g/dL 12.7*   HEMATOCRIT % 39.8*   PLATELETS AUTO x10*3/uL 194   NEUTROS PCT AUTO % 81.8   LYMPHS PCT AUTO % 10.3   MONOS PCT AUTO % 6.0   EOS PCT AUTO % 1.3     Results from last 7 days   Lab Units 03/11/24  0515 03/10/24  1400   SODIUM  mmol/L 133* 134*   POTASSIUM mmol/L 4.3 4.5   CHLORIDE mmol/L 102 100   CO2 mmol/L 20* 19*   BUN mg/dL 63* 62*   CREATININE mg/dL 3.58* 3.35*   GLUCOSE mg/dL 88 124*   CALCIUM mg/dL 7.9* 8.6       CT chest wo IV contrast   Final Result   Large right and small left pleural effusion and mild bibasilar   atelectatic/consolidative opacities.        6 mm left lower lobe pulmonary nodule; follow-up CT chest is   recommended in 6 months to assess stability.        MACRO:   None        Signed by: Frandy Sutton 3/10/2024 7:37 PM   Dictation workstation:   [id number]      US renal complete   Final Result   Severe cortical thinning of right kidney and right hydronephrosis. CT   may be obtained to better assess the renal anatomy.        Multiple left renal cysts.        Prostatomegaly resulting in posterior mass effect on the urinary   bladder; please clinically correlate with PSA.        MACRO:   None        Signed by: Frandy Sutton 3/10/2024 6:45 PM   Dictation workstation:   [id number]      XR chest 2 views   Final Result   1.  Right basilar airspace opacification which may reflect pneumonia   in the appropriate clinical setting             Signed by: Tre Bronson 3/10/2024 2:01 PM   Dictation workstation:   [id number]      Transthoracic Echo (TTE) Complete    (Results Pending)   CT abdomen pelvis wo IV contrast    (Results Pending)   US thoracentesis    (Results Pending)            Assessment/Plan      Tom Haas is a 78-year-old male with a past medical history of coronary artery disease, carotid stenosis, hypertension x 20 years, LVH, hyperlipidemia and a history of G3A chronic kidney disease having a creatinine of 1.37 back in July 2020 when last checked.  He has been lost to follow-up and has not seen a physician in approximately 2 and a half years.  He has been off medication during this duration.  He presents with shortness of breath and hypertension.    In the ED, his BP was 207/114 mmHg with a heart rate of  115.  His high-sensitivity troponin was greater than than 3000 x 2, BNP of 2432, creatinine of 3.35.  ECG showed sinus tachycardia.  There was a large right and small left pleural effusion and a 6 mm left lower lobe pulmonary nodule on chest CT imaging.  Renal ultrasound imaging showed a right kidney 8.6 cm with hydronephrosis and a left kidney of 13.4 cm.  Multiple left renal cyst and severe cortical thinning on the right.  Prostate was enlarged.  Nephrology is consulted for renal care.    Mr. Haas has severe and untreated hypertension with a background of G3 CKD nearly 4 years ago.  Question if this is acute kidney injury versus progressive chronic kidney disease. Favor the latter. In addition he may have solitary renal function. He otherwise denies a history of obvious cancer, connective tissue disease, anti-inflammatory use or family history of ESRD. He will go for a thoracentesis. IVP Lasix was ordered for HF exacerbation. 2 D ECHO and abdominal CT is ordered. We will await further urology recommendations. I will add on a PSA level. His urine is positive for protein and blood with a reassuring and negative sediment.  We will quantify his proteinuria, check a 25 vitamin D and intact PTH.  His blood pressures have come down on 10 mg of Norvasc, IV hydralazine and diuretic pushes.  There is no indication for renal replacement therapy.  Trend his RFP.  Will follow.        Principal Problem:    Hypertensive urgency  Active Problems:    Non-STEMI (non-ST elevated myocardial infarction) (CMS/HCC)    Acute heart failure (CMS/HCC)    Chronic renal impairment    Pneumonia due to infectious organism    CHF (congestive heart failure), NYHA class I, acute on chronic, combined (CMS/[facility])              I spent 50 minutes in the professional and overall care of this patient.      Anish Bazan, DO

## 2024-03-11 NOTE — CONSULTS
"Reason For Consult  Hydronephrosis    History Of Present Illness  Tom Haas is a 78 y.o. male presenting with dyspnea.  He reports having difficulty getting up and down stairs.  Upon work up labs revealed acute renal failure.  Renal ultrasound done shows hydronephrosis.  He denies any flank or abdominal pain.  No real voiding issues, no hematuria.  His creatinine in 2020 was < 2.       Past Medical History  He has a past medical history of Hypertension, Occlusion and stenosis of unspecified carotid artery, Other conditions influencing health status, Personal history of other diseases of the circulatory system, Personal history of other diseases of the circulatory system, and Personal history of other endocrine, nutritional and metabolic disease.    Surgical History  He has no past surgical history on file.     Social History  He reports that he has never smoked. He has never used smokeless tobacco. No history on file for alcohol use and drug use.    Family History  No family history on file.     Allergies  Patient has no known allergies.    Review of Systems  Review of Systems   Cardiovascular:  Positive for dyspnea on exertion.   Respiratory:  Positive for shortness of breath.    Gastrointestinal:  Negative for abdominal pain, nausea and vomiting.   Genitourinary:  Negative for dysuria, flank pain and hematuria.          Physical Exam  Awake, alert  Breathing comfortably  Abdomens soft, ND, NT  No CVA tenderness       Last Recorded Vitals  Blood pressure (!) 154/97, pulse 97, temperature 36.7 °C (98.1 °F), resp. rate 19, height 1.651 m (5' 5\"), weight 72.6 kg (160 lb), SpO2 97 %.    Relevant Results      Results for orders placed or performed during the hospital encounter of 03/10/24 (from the past 24 hour(s))   ECG 12 lead   Result Value Ref Range    Ventricular Rate 115 BPM    Atrial Rate 115 BPM    ME Interval 182 ms    QRS Duration 112 ms    QT Interval 328 ms    QTC Calculation(Bazett) 453 ms    P Axis 54 " degrees    R Axis -25 degrees    T Axis 152 degrees    QRS Count 19 beats    Q Onset 212 ms    P Onset 121 ms    P Offset 167 ms    T Offset 376 ms    QTC Fredericia 407 ms   CBC with Differential   Result Value Ref Range    WBC 10.2 4.4 - 11.3 x10*3/uL    nRBC 0.0 0.0 - 0.0 /100 WBCs    RBC 4.46 (L) 4.50 - 5.90 x10*6/uL    Hemoglobin 12.7 (L) 13.5 - 17.5 g/dL    Hematocrit 39.8 (L) 41.0 - 52.0 %    MCV 89 80 - 100 fL    MCH 28.5 26.0 - 34.0 pg    MCHC 31.9 (L) 32.0 - 36.0 g/dL    RDW 14.0 11.5 - 14.5 %    Platelets 194 150 - 450 x10*3/uL    Neutrophils % 81.8 40.0 - 80.0 %    Immature Granulocytes %, Automated 0.3 0.0 - 0.9 %    Lymphocytes % 10.3 13.0 - 44.0 %    Monocytes % 6.0 2.0 - 10.0 %    Eosinophils % 1.3 0.0 - 6.0 %    Basophils % 0.3 0.0 - 2.0 %    Neutrophils Absolute 8.30 (H) 1.60 - 5.50 x10*3/uL    Immature Granulocytes Absolute, Automated 0.03 0.00 - 0.50 x10*3/uL    Lymphocytes Absolute 1.05 0.80 - 3.00 x10*3/uL    Monocytes Absolute 0.61 0.05 - 0.80 x10*3/uL    Eosinophils Absolute 0.13 0.00 - 0.40 x10*3/uL    Basophils Absolute 0.03 0.00 - 0.10 x10*3/uL   Comprehensive Metabolic Panel   Result Value Ref Range    Glucose 124 (H) 74 - 99 mg/dL    Sodium 134 (L) 136 - 145 mmol/L    Potassium 4.5 3.5 - 5.3 mmol/L    Chloride 100 98 - 107 mmol/L    Bicarbonate 19 (L) 21 - 32 mmol/L    Anion Gap 20 10 - 20 mmol/L    Urea Nitrogen 62 (H) 6 - 23 mg/dL    Creatinine 3.35 (H) 0.50 - 1.30 mg/dL    eGFR 18 (L) >60 mL/min/1.73m*2    Calcium 8.6 8.6 - 10.3 mg/dL    Albumin 4.3 3.4 - 5.0 g/dL    Alkaline Phosphatase 41 33 - 136 U/L    Total Protein 7.9 6.4 - 8.2 g/dL    AST 23 9 - 39 U/L    Bilirubin, Total 0.4 0.0 - 1.2 mg/dL    ALT 18 10 - 52 U/L   Brain Natriuretic Peptide   Result Value Ref Range    BNP 2,432 (H) 0 - 99 pg/mL   Troponin I, High Sensitivity   Result Value Ref Range    Troponin I, High Sensitivity 3,071 (HH) 0 - 20 ng/L   Troponin I, High Sensitivity   Result Value Ref Range    Troponin I, High  Sensitivity 3,111 (HH) 0 - 20 ng/L   Lactate   Result Value Ref Range    Lactate 1.9 0.4 - 2.0 mmol/L   Blood Culture    Specimen: Peripheral Venipuncture; Blood culture   Result Value Ref Range    Blood Culture Loaded on Instrument - Culture in progress    Blood Culture    Specimen: Peripheral Venipuncture; Blood culture   Result Value Ref Range    Blood Culture Loaded on Instrument - Culture in progress    Urinalysis with Reflex Culture and Microscopic   Result Value Ref Range    Color, Urine Straw Straw, Yellow    Appearance, Urine Clear Clear    Specific Gravity, Urine 1.009 1.005 - 1.035    pH, Urine 6.0 5.0, 5.5, 6.0, 6.5, 7.0, 7.5, 8.0    Protein, Urine 100 (2+) (N) NEGATIVE mg/dL    Glucose, Urine NEGATIVE NEGATIVE mg/dL    Blood, Urine SMALL (1+) (A) NEGATIVE    Ketones, Urine NEGATIVE NEGATIVE mg/dL    Bilirubin, Urine NEGATIVE NEGATIVE    Urobilinogen, Urine <2.0 <2.0 mg/dL    Nitrite, Urine NEGATIVE NEGATIVE    Leukocyte Esterase, Urine NEGATIVE NEGATIVE   Extra Urine Gray Tube   Result Value Ref Range    Extra Tube Hold for add-ons.    Urinalysis Microscopic   Result Value Ref Range    WBC, Urine 1-5 1-5, NONE /HPF    RBC, Urine 3-5 NONE, 1-2, 3-5 /HPF    Bacteria, Urine 1+ (A) NONE SEEN /HPF    Mucus, Urine 1+ Reference range not established. /LPF   Urine electrolytes   Result Value Ref Range    Sodium, Urine Random 83 mmol/L    Sodium/Creatinine Ratio 279 Not established. mmol/g Creat    Potassium, Urine Random 29 mmol/L    Potassium/Creatinine Ratio 97 Not established mmol/g Creat    Chloride, Urine Random 97 mmol/L    Chloride/Creatinine Ratio 326 (H) 23 - 275 mmol/g creat    Creatinine, Urine Random 29.8 20.0 - 370.0 mg/dL   Lipid Panel   Result Value Ref Range    Cholesterol 173 0 - 199 mg/dL    HDL-Cholesterol 38.1 mg/dL    Cholesterol/HDL Ratio 4.5     LDL Calculated 113 (H) <=99 mg/dL    VLDL 22 0 - 40 mg/dL    Triglycerides 108 0 - 149 mg/dL    Non HDL Cholesterol 135 0 - 149 mg/dL   Basic  Metabolic Panel   Result Value Ref Range    Glucose 88 74 - 99 mg/dL    Sodium 133 (L) 136 - 145 mmol/L    Potassium 4.3 3.5 - 5.3 mmol/L    Chloride 102 98 - 107 mmol/L    Bicarbonate 20 (L) 21 - 32 mmol/L    Anion Gap 15 10 - 20 mmol/L    Urea Nitrogen 63 (H) 6 - 23 mg/dL    Creatinine 3.58 (H) 0.50 - 1.30 mg/dL    eGFR 17 (L) >60 mL/min/1.73m*2    Calcium 7.9 (L) 8.6 - 10.3 mg/dL          Assessment/Plan     Acute renal failure, hydronephrosis.      U/S reviewed.  A CT scan has been ordered.  Will follow up results.    He may need a nephrology consult.        I spent 30 minutes in the professional and overall care of this patient.      Vane Monzon MD

## 2024-03-11 NOTE — CONSULTS
Inpatient consult to Cardiology  Consult performed by: Emerita Weinberg, APRN-CNP  Consult ordered by: Cruz Durham MD  Reason for consult: NSTEMI CHF HTN CRF.      History Of Present Illness:    Tom Haas is a 78 y.o. male presenting with Coronary artery disease reportedly C in 2005 at [facility] revealed   LAD 50-60% ,  nondominant LCX 80-90% and dominant RCA 40% stenosis significantly elevated LVEDP, Carotid artery stenosis, Hypertension  with LVH, Hyperlipidemia, Hydronephrosis and Chronic kidney disease with prior renal cysts. Presented with complains of shortness of breath and high blood pressure. Cardiology is consulted for NSTEMI CHF HTN CRF.     Patient reports for the last two weeks he has been struggling with increased dyspnea on exertion.  Denies having any chest pain, lower extremity edema, orthopnea, dizziness, lightheadedness, syncope or near syncope.  States he went out to eat Saturday  night. Had to go up and down a flight of stairs and really struggled.  He went home and took his blood pressure which was significantly elevated in the 200s.  Admits he stopped taking all of his medications on a long time ago.  He stopped following with his PCP.  Has been treating his blood pressure with supplements.  Currently only takes aspirin.    At [facility], EKG showed sinus tachycardia PACs LVH with regularization abnormalities.  CT chest showed large right and small left pleural effusion and mild bibasilar atelectasis/consolidative opacities, cardiomegaly, coronary artery calcifications, no significant pericardial effusion, 6 mm left lower lobe pulmonary nodule.  Labs remarkable for elevated high-sensitivity troponin 3071/3111 BNP 2432 BUN 62 creatinine 3.35 sodium 134 lactate normal.  Initial vital signs temp 36.5 heart rate 115 respiratory rate 20 blood pressure 207/114 pulse ox 95% on room air.  He was treated with aspirin 81 mg, IV azithromycin/Rocephin, Lasix 20 mg IV push x 1, hydralazine 5 mg and  "nitroglycerin ointment.    [facility] patient was [facility] followed by cardiology Dr. Ramirez.  Last seen in 2004.  According to documentation patient underwent a left heart cath in 2005 after presented with hypertensive crisis and ECG tracing showing diffuse ischemic depression.  Left heart cath at that time revealed 50 to 60% tubular narrowing at the origin of the LAD, nondominant left circumflex with a mid 80% to 90% proximal stenosis in the first marginal branch, dominant RCA with a tubular 40% segmental narrowing and significantly elevated LVEDP.  At that time he was managed medically.     Last Recorded Vitals:  Vitals:    03/10/24 2015 03/10/24 2300 03/11/24 0300 03/11/24 0700   BP: 138/78 (!) 149/101 144/84 (!) 154/97   Pulse: 96 98 89 97   Resp: (!) 24 22 20 19   Temp:  36.6 °C (97.9 °F) 36.8 °C (98.2 °F) 36.7 °C (98.1 °F)   SpO2: 99% 97% 96% 97%   Weight:       Height:           Last Labs:  LABS:  CMP:  Results from last 7 days   Lab Units 03/11/24  0515 03/10/24  1400   SODIUM mmol/L 133* 134*   POTASSIUM mmol/L 4.3 4.5   CHLORIDE mmol/L 102 100   CO2 mmol/L 20* 19*   ANION GAP mmol/L 15 20   BUN mg/dL 63* 62*   CREATININE mg/dL 3.58* 3.35*   EGFR mL/min/1.73m*2 17* 18*   ALBUMIN g/dL  --  4.3   ALT U/L  --  18   AST U/L  --  23   BILIRUBIN TOTAL mg/dL  --  0.4     CBC:  Results from last 7 days   Lab Units 03/10/24  1400   WBC AUTO x10*3/uL 10.2   HEMOGLOBIN g/dL 12.7*   HEMATOCRIT % 39.8*   PLATELETS AUTO x10*3/uL 194   MCV fL 89     COAG:     ABO: No results found for: \"ABO\"  HEME/ENDO:     CARDIAC:   Results from last 7 days   Lab Units 03/10/24  1450 03/10/24  1400   TROPHS ng/L 3,111* 3,071*   BNP pg/mL  --  2,432*     Recent Labs     03/11/24  0515   CHOL 173   LDLCALC 113*   HDL 38.1   TRIG 108        Imagine results  CT chest wo IV contrast   Final Result   Large right and small left pleural effusion and mild bibasilar   atelectatic/consolidative opacities.        6 mm left " lower lobe pulmonary nodule; follow-up CT chest is   recommended in 6 months to assess stability.        MACRO:   None        Signed by: Frandy Sutton 3/10/2024 7:37 PM   Dictation workstation:   [id number]      US renal complete   Final Result   Severe cortical thinning of right kidney and right hydronephrosis. CT   may be obtained to better assess the renal anatomy.        Multiple left renal cysts.        Prostatomegaly resulting in posterior mass effect on the urinary   bladder; please clinically correlate with PSA.        MACRO:   None        Signed by: Frandy Sutton 3/10/2024 6:45 PM   Dictation workstation:   [id number]      XR chest 2 views   Final Result   1.  Right basilar airspace opacification which may reflect pneumonia   in the appropriate clinical setting             Signed by: Tre Bronson 3/10/2024 2:01 PM   Dictation workstation:   [id number]      Transthoracic Echo (TTE) Complete    (Results Pending)   CT abdomen pelvis wo IV contrast    (Results Pending)      Last I/O:  I/O last 3 completed shifts:  In: 873.3 (12 mL/kg) [P.O.:240; I.V.:333.3 (4.6 mL/kg); IV Piggyback:300]  Out: 850 (11.7 mL/kg) [Urine:850 (0.3 mL/kg/hr)]  Weight: 72.6 kg     Past Cardiology Tests (Last 3 Years):  EKG:  ECG 12 lead 03/10/2024 (Preliminary)        Telemetry  Not currently on tele    Echo:  November 12, 2012-Per cardiology note and  all scripts  LVEF 60-65%, trace tricuspid regurgitation and moderate pulmonary hypertension with RVSP 53    Ejection Fractions:  11/2012 60-65%     Cath:  December 2005- Per cardiology note in  [employer]   50% to 60% tubular narrowing at the origin of the LAD and nondominant  LCX with an 80% to 90% proximal stenosis in the first marginal branch,  dominant RCA with a tubular 40% segmental narrowing beginning shortly after  the origin with mild disease throughout the remainder of the vessel. He  had significant elevation of resting LVEDP, mild LV chamber enlargement,  low normal  LV systolic function    Stress Test:  No results found for this or any previous visit from the past 1095 days.    Cardiac Imaging:  No results found for this or any previous visit from the past 1095 days.      Past Medical History:  As above     He has a past medical history of Hypertension, Occlusion and stenosis of unspecified carotid artery, Other conditions influencing health status, Personal history of other diseases of the circulatory system, Personal history of other diseases of the circulatory system, and Personal history of other endocrine, nutritional and metabolic disease.    Past Surgical History:  He has no past surgical history on file.      Social History:  He reports that he has never smoked. He has never used smokeless tobacco. No history on file for alcohol use and drug use.    Family History:  No family history on file.  Denies significant family history of cardiac disease or sudden cardiac death   Allergies:  Patient has no known allergies.    Inpatient Medications:  Scheduled medications   Medication Dose Route Frequency    aspirin  81 mg oral Daily    azithromycin  500 mg intravenous q24h    cefTRIAXone  2 g intravenous q24h    enoxaparin  30 mg subcutaneous q24h    perflutren lipid microspheres  0.5-10 mL of dilution intravenous Once in imaging    perflutren protein A microsphere  0.5 mL intravenous Once in imaging    polyethylene glycol  17 g oral Daily    sulfur hexafluoride microsphr  2 mL intravenous Once in imaging     PRN medications   Medication    hydrALAZINE     Continuous Medications   Medication Dose Last Rate    sodium chloride 0.9%  25 mL/hr 25 mL/hr (03/11/24 0006)     Outpatient Medications:  No current outpatient medications    Physical Exam:  GENERAL: alert, cooperative, pleasant, in no acute distress  SKIN: warm, dry  NECK: Mild elevated JVP  CARDIAC: Regular rate and rhythm no murmurs  CHEST: Normal respiratory efforts, lungs clear to auscultation bilaterally. On room  air  ABDOMEN: soft, nondistended  EXTREMITIES: no lower extremity edema  NEURO: Alert and oriented x 3.  Grossly normal.  Moves all 4 extremities.      Assessment/Plan   Tom Haas is a 78 y.o. male presenting with Coronary artery disease reportedly LHC in 2005 at [facility] revealed   LAD 50-60% ,  nondominant LCX 80-90% and dominant RCA 40% stenosis significantly elevated LVEDP, Carotid artery stenosis, Hypertension  with LVH, Hyperlipidemia, Hydronephrosis and Chronic kidney disease with prior renal cysts. Presented with complains of shortness of breath and high blood pressure. Cardiology is consulted for NSTEMI CHF HTN CRF.     Acute CHF (probably systolic)- CT scan showed large right and small left pleural effusion. Recommend large volume thoracentesis and IV diuresis with Lasix 80 mg BID.  Echocardiogram ordered. Further recommendations once results are available.   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 Non-MI troponin elevation / acute non-traumatic myocardial injury. Core measures do not apply.   CAD- LHC in 2005 at [facility] revealed LAD 50-60% ,  nondominant LCX 80-90% and dominant RCA 40% stenosis.  Continue aspirin 81 mg. Recommend starting atorvastatin 20 mg daily   Hypertension with LVH- Suboptimal BP. Start amlodipine 10 mg daily    Recommendations   Diurese with Lasix 80 mg BID   Benefits from thoracentesis- defer to primary   Echocardiogram  Cardiac medications  as above    Code Status:  Full Code      Emerita Weinberg, APRN-CNP

## 2024-03-11 NOTE — CARE PLAN
The patient's goals for the shift include      The clinical goals for the shift include Pt BP will be WDL throughout shift    Over the shift, the patient did make progress toward the following goals.

## 2024-03-11 NOTE — PROGRESS NOTES
03/11/24 0938   Discharge Planning   Living Arrangements Spouse/significant other   Support Systems Spouse/significant other   Type of Residence Private residence   Number of Stairs to Enter Residence 2   Number of Stairs Within Residence 10   Do you have animals or pets at home? No   Who is requesting discharge planning? Patient   Home or Post Acute Services None   Patient expects to be discharged to: Home no needs   Does the patient need discharge transport arranged? No   Financial Resource Strain   How hard is it for you to pay for the very basics like food, housing, medical care, and heating? Not very   Housing Stability   In the last 12 months, was there a time when you were not able to pay the mortgage or rent on time? N   In the last 12 months, was there a time when you did not have a steady place to sleep or slept in a shelter (including now)? N   Transportation Needs   In the past 12 months, has lack of transportation kept you from medical appointments or from getting medications? no   In the past 12 months, has lack of transportation kept you from meetings, work, or from getting things needed for daily living? No     3/11/24 09:39 I met with this patient at bedside he is alertx3 with his wife at bedside also. At baseline he is Independent with his Adl's and drives at baseline, he will not need transportation home, his wife will take him home, he is in the Process of changing PCP, he wants to use Dr Boothe with  , he stated this is who is son go's to. He does not have any social or financial concerns, I will continue to monitor his progress for discharge planning.      Plan: CHF, IV Lasix, B/P  Disposition: Home with wife  Barrier: IV Lasix, ECHO, Thorenecitis?  ADOD:  3/13/24

## 2024-03-11 NOTE — POST-PROCEDURE NOTE
Interventional Radiology Brief Postprocedure Note    Attending: Balbir Edmonds CNP    Assistant: none    Diagnosis: right pleural effusion    Description of procedure: Ultrasound guided thoracentesis was preformed on the right.  900mL of straw-yellow fluid was drained.        Anesthesia:  Local    Complications: None    Estimated Blood Loss: none    Medications  As of 03/11/24 1622      furosemide (Lasix) injection 20 mg (mg) Total dose:  20 mg Dosing weight:  72.6      Date/Time Rate/Dose/Volume Action       03/10/24  1413 20 mg Given               furosemide (Lasix) injection 80 mg (mg) Total dose:  80 mg Dosing weight:  72.6      Date/Time Rate/Dose/Volume Action       03/11/24  1204 80 mg Given               nitroglycerin (Robert-Bid) 2 % ointment 0.5 inch (inch) Total dose:  0.5 inch Dosing weight:  72.6      Date/Time Rate/Dose/Volume Action       03/10/24  1413 0.5 inch Given               hydrALAZINE (Apresoline) injection 5 mg (mg) Total dose:  5 mg Dosing weight:  72.6      Date/Time Rate/Dose/Volume Action       03/10/24  1612 5 mg Given               enoxaparin (Lovenox) syringe 30 mg (mg) Total dose:  30 mg Dosing weight:  72.6      Date/Time Rate/Dose/Volume Action       03/10/24  1640 30 mg Given               polyethylene glycol (Glycolax, Miralax) packet 17 g (g) Total dose:  17 g* Dosing weight:  72.6   *Administration not included in total     Date/Time Rate/Dose/Volume Action       03/10/24  1630 *17 g Missed     03/11/24  0839 17 g Given               perflutren lipid microspheres (Definity) injection 0.5-10 mL of dilution (mL of dilution) Total dose:  10 mL of dilution Dosing weight:  72.6      Date/Time Rate/Dose/Volume Action       03/11/24  1508 10 mL of dilution Given               sodium chloride 0.9% infusion (mL/hr) Total volume:  333.33 mL* Dosing weight:  72.6   *From user-documented volume     Date/Time Rate/Dose/Volume Action       03/10/24  1640 25 mL/hr New Bag     03/11/24  0006 25  mL/hr - 185.83 mL Rate Verify      0600 147.5 mL       1114  Stopped               cefTRIAXone (Rocephin) in dextrose 5 % water (D5W) 50 mL IV 2 g (mL/hr) Total dose:  2 g* Dosing weight:  72.6   *From user-documented volume     Date/Time Rate/Dose/Volume Action       03/10/24  1640 2 g - 100 mL/hr (over 30 min) New Bag      1715 50 mL Stopped               azithromycin (Zithromax) in dextrose 5 % in water (D5W) 250 mL  mg (mL/hr) Total dose:  500 mg* Dosing weight:  72.6   *From user-documented volume     Date/Time Rate/Dose/Volume Action       03/10/24  1715 500 mg - 250 mL/hr (over 60 min) New Bag      1816 250 mL Stopped               aspirin chewable tablet 81 mg (mg) Total dose:  162 mg Dosing weight:  72.6      Date/Time Rate/Dose/Volume Action       03/10/24  1811 81 mg Given     03/11/24  0839 81 mg Given               amLODIPine (Norvasc) tablet 10 mg (mg) Total dose:  10 mg Dosing weight:  72.6      Date/Time Rate/Dose/Volume Action       03/11/24  1204 10 mg Given               lidocaine PF (Xylocaine) 10 mg/mL (1 %) injection (mL) Total volume:  10 mL      Date/Time Rate/Dose/Volume Action       03/11/24  1614 10 mL Given                   Specimens collected      See detailed result report with images in PACS.    The patient tolerated the procedure well without incident or complication and is in stable condition.

## 2024-03-12 LAB
25(OH)D3 SERPL-MCNC: 35 NG/ML (ref 30–100)
ALBUMIN FLD-MCNC: 1.3 G/DL
ALBUMIN SERPL BCP-MCNC: 3.4 G/DL (ref 3.4–5)
AMYLASE FLD-CCNC: 19 U/L
ANION GAP SERPL CALC-SCNC: 17 MMOL/L (ref 10–20)
BASOPHILS # BLD AUTO: 0.04 X10*3/UL (ref 0–0.1)
BASOPHILS NFR BLD AUTO: 0.5 %
BUN SERPL-MCNC: 70 MG/DL (ref 6–23)
C3 SERPL-MCNC: 121 MG/DL (ref 87–200)
C4 SERPL-MCNC: 33 MG/DL (ref 10–50)
CALCIUM SERPL-MCNC: 8.5 MG/DL (ref 8.6–10.3)
CHLORIDE SERPL-SCNC: 101 MMOL/L (ref 98–107)
CO2 SERPL-SCNC: 22 MMOL/L (ref 21–32)
CREAT SERPL-MCNC: 4.02 MG/DL (ref 0.5–1.3)
CREAT UR-MCNC: 52.8 MG/DL (ref 20–370)
EGFRCR SERPLBLD CKD-EPI 2021: 14 ML/MIN/1.73M*2
EOSINOPHIL # BLD AUTO: 0.35 X10*3/UL (ref 0–0.4)
EOSINOPHIL NFR BLD AUTO: 4.2 %
ERYTHROCYTE [DISTWIDTH] IN BLOOD BY AUTOMATED COUNT: 13.9 % (ref 11.5–14.5)
GLUCOSE FLD-MCNC: 120 MG/DL
GLUCOSE SERPL-MCNC: 94 MG/DL (ref 74–99)
HCT VFR BLD AUTO: 32.3 % (ref 41–52)
HGB BLD-MCNC: 10.6 G/DL (ref 13.5–17.5)
IMM GRANULOCYTES # BLD AUTO: 0.03 X10*3/UL (ref 0–0.5)
IMM GRANULOCYTES NFR BLD AUTO: 0.4 % (ref 0–0.9)
LDH FLD L TO P-CCNC: 68 U/L
LDH SERPL L TO P-CCNC: 182 U/L (ref 84–246)
LYMPHOCYTES # BLD AUTO: 0.99 X10*3/UL (ref 0.8–3)
LYMPHOCYTES NFR BLD AUTO: 11.8 %
MCH RBC QN AUTO: 28.6 PG (ref 26–34)
MCHC RBC AUTO-ENTMCNC: 32.8 G/DL (ref 32–36)
MCV RBC AUTO: 87 FL (ref 80–100)
MONOCYTES # BLD AUTO: 0.78 X10*3/UL (ref 0.05–0.8)
MONOCYTES NFR BLD AUTO: 9.3 %
NEUTROPHILS # BLD AUTO: 6.21 X10*3/UL (ref 1.6–5.5)
NEUTROPHILS NFR BLD AUTO: 73.8 %
NRBC BLD-RTO: 0 /100 WBCS (ref 0–0)
PHOSPHATE SERPL-MCNC: 4.9 MG/DL (ref 2.5–4.9)
PLATELET # BLD AUTO: 154 X10*3/UL (ref 150–450)
POTASSIUM SERPL-SCNC: 3.7 MMOL/L (ref 3.5–5.3)
PROT FLD-MCNC: 1.9 G/DL
PROT UR-ACNC: 158 MG/DL (ref 5–25)
PROT/CREAT UR: 2.99 MG/MG CREAT (ref 0–0.17)
PSA SERPL-MCNC: 3.9 NG/ML
PTH-INTACT SERPL-MCNC: 306.4 PG/ML (ref 18.5–88)
RBC # BLD AUTO: 3.71 X10*6/UL (ref 4.5–5.9)
SODIUM SERPL-SCNC: 136 MMOL/L (ref 136–145)
WBC # BLD AUTO: 8.4 X10*3/UL (ref 4.4–11.3)

## 2024-03-12 PROCEDURE — 80069 RENAL FUNCTION PANEL: CPT | Performed by: INTERNAL MEDICINE

## 2024-03-12 PROCEDURE — 82306 VITAMIN D 25 HYDROXY: CPT | Mod: AHULAB | Performed by: INTERNAL MEDICINE

## 2024-03-12 PROCEDURE — 83615 LACTATE (LD) (LDH) ENZYME: CPT | Performed by: INTERNAL MEDICINE

## 2024-03-12 PROCEDURE — 99233 SBSQ HOSP IP/OBS HIGH 50: CPT | Performed by: INTERNAL MEDICINE

## 2024-03-12 PROCEDURE — 2500000004 HC RX 250 GENERAL PHARMACY W/ HCPCS (ALT 636 FOR OP/ED): Performed by: INTERNAL MEDICINE

## 2024-03-12 PROCEDURE — 82570 ASSAY OF URINE CREATININE: CPT | Performed by: INTERNAL MEDICINE

## 2024-03-12 PROCEDURE — 2500000001 HC RX 250 WO HCPCS SELF ADMINISTERED DRUGS (ALT 637 FOR MEDICARE OP): Performed by: NURSE PRACTITIONER

## 2024-03-12 PROCEDURE — 83970 ASSAY OF PARATHORMONE: CPT | Mod: AHULAB | Performed by: INTERNAL MEDICINE

## 2024-03-12 PROCEDURE — 2500000001 HC RX 250 WO HCPCS SELF ADMINISTERED DRUGS (ALT 637 FOR MEDICARE OP): Performed by: INTERNAL MEDICINE

## 2024-03-12 PROCEDURE — 36415 COLL VENOUS BLD VENIPUNCTURE: CPT | Performed by: INTERNAL MEDICINE

## 2024-03-12 PROCEDURE — 86255 FLUORESCENT ANTIBODY SCREEN: CPT | Performed by: INTERNAL MEDICINE

## 2024-03-12 PROCEDURE — 86160 COMPLEMENT ANTIGEN: CPT | Mod: AHULAB | Performed by: INTERNAL MEDICINE

## 2024-03-12 PROCEDURE — 85025 COMPLETE CBC W/AUTO DIFF WBC: CPT | Performed by: INTERNAL MEDICINE

## 2024-03-12 PROCEDURE — 86038 ANTINUCLEAR ANTIBODIES: CPT | Mod: AHULAB | Performed by: INTERNAL MEDICINE

## 2024-03-12 PROCEDURE — 1200000002 HC GENERAL ROOM WITH TELEMETRY DAILY

## 2024-03-12 PROCEDURE — 86036 ANCA SCREEN EACH ANTIBODY: CPT | Performed by: INTERNAL MEDICINE

## 2024-03-12 PROCEDURE — 84153 ASSAY OF PSA TOTAL: CPT | Mod: AHULAB | Performed by: INTERNAL MEDICINE

## 2024-03-12 RX ORDER — DOXYCYCLINE HYCLATE 100 MG
100 TABLET ORAL EVERY 12 HOURS SCHEDULED
Status: DISCONTINUED | OUTPATIENT
Start: 2024-03-12 | End: 2024-03-13 | Stop reason: HOSPADM

## 2024-03-12 RX ORDER — FUROSEMIDE 10 MG/ML
40 INJECTION INTRAMUSCULAR; INTRAVENOUS
Status: DISCONTINUED | OUTPATIENT
Start: 2024-03-12 | End: 2024-03-12

## 2024-03-12 RX ORDER — ISOSORBIDE MONONITRATE 30 MG/1
30 TABLET, EXTENDED RELEASE ORAL DAILY
Status: DISCONTINUED | OUTPATIENT
Start: 2024-03-12 | End: 2024-03-13 | Stop reason: HOSPADM

## 2024-03-12 RX ORDER — CARVEDILOL 6.25 MG/1
6.25 TABLET ORAL 2 TIMES DAILY
Status: DISCONTINUED | OUTPATIENT
Start: 2024-03-12 | End: 2024-03-13 | Stop reason: HOSPADM

## 2024-03-12 RX ADMIN — DOXYCYCLINE HYCLATE 100 MG: 100 TABLET, COATED ORAL at 08:51

## 2024-03-12 RX ADMIN — ISOSORBIDE MONONITRATE 30 MG: 30 TABLET, EXTENDED RELEASE ORAL at 08:51

## 2024-03-12 RX ADMIN — ATORVASTATIN CALCIUM 20 MG: 20 TABLET, FILM COATED ORAL at 21:52

## 2024-03-12 RX ADMIN — CARVEDILOL 6.25 MG: 6.25 TABLET, FILM COATED ORAL at 08:51

## 2024-03-12 RX ADMIN — FUROSEMIDE 40 MG: 10 INJECTION, SOLUTION INTRAVENOUS at 08:43

## 2024-03-12 RX ADMIN — ENOXAPARIN SODIUM 30 MG: 30 INJECTION SUBCUTANEOUS at 16:49

## 2024-03-12 RX ADMIN — DOXYCYCLINE HYCLATE 100 MG: 100 TABLET, COATED ORAL at 21:51

## 2024-03-12 RX ADMIN — CARVEDILOL 6.25 MG: 6.25 TABLET, FILM COATED ORAL at 21:51

## 2024-03-12 RX ADMIN — ASPIRIN 81 MG CHEWABLE TABLET 81 MG: 81 TABLET CHEWABLE at 08:51

## 2024-03-12 ASSESSMENT — COGNITIVE AND FUNCTIONAL STATUS - GENERAL: DAILY ACTIVITIY SCORE: 24

## 2024-03-12 ASSESSMENT — PAIN SCALES - WONG BAKER: WONGBAKER_NUMERICALRESPONSE: NO HURT

## 2024-03-12 ASSESSMENT — PAIN SCALES - GENERAL: PAINLEVEL_OUTOF10: 0 - NO PAIN

## 2024-03-12 NOTE — PROGRESS NOTES
Tom Haas is a 78 y.o. male on day 1 of admission presenting with Hypertensive urgency.      Subjective   Seen and examined. Sitting up at the edge of the bed.   Easy respirations on room air. No CP or shortness of breath.   S/p Rt thoracentesis with 900 mls removed.   He is without complaints.   Chart/labs/meds/notes/imaging/VS reviewed.       Objective          Vitals 24HR  Heart Rate:  []   Temp:  [36.6 °C (97.8 °F)-36.9 °C (98.4 °F)]   Resp:  [16-19]   BP: (122-171)/(72-99)   SpO2:  [95 %-99 %]     Intake/Output last 3 Shifts:    Intake/Output Summary (Last 24 hours) at 3/12/2024 1446  Last data filed at 3/11/2024 2100  Gross per 24 hour   Intake 120 ml   Output --   Net 120 ml         Physical Exam  GENERAL: alert, cooperative, pleasant, in no acute distress  SKIN: warm, dry  NECK: No JVP elevation.   CARDIAC: Regular rate and rhythm no murmurs  CHEST: Normal respiratory efforts, lungs clear to auscultation bilaterally.   ABDOMEN: soft, nondistended  EXTREMITIES: no lower extremity edema  NEURO: Alert and oriented x 3. Non focal.      Scheduled Medications  aspirin, 81 mg, oral, Daily  atorvastatin, 20 mg, oral, Nightly  carvedilol, 6.25 mg, oral, BID  doxycylcine, 100 mg, oral, q12h RASHAWN  enoxaparin, 30 mg, subcutaneous, q24h  isosorbide mononitrate ER, 30 mg, oral, Daily  perflutren protein A microsphere, 0.5 mL, intravenous, Once in imaging  polyethylene glycol, 17 g, oral, Daily  sulfur hexafluoride microsphr, 2 mL, intravenous, Once in imaging      Continuous medications       PRN medications: hydrALAZINE     Relevant Results  Results from last 7 days   Lab Units 03/12/24  0627 03/10/24  1400   WBC AUTO x10*3/uL 8.4 10.2   HEMOGLOBIN g/dL 10.6* 12.7*   HEMATOCRIT % 32.3* 39.8*   PLATELETS AUTO x10*3/uL 154 194   NEUTROS PCT AUTO % 73.8 81.8   LYMPHS PCT AUTO % 11.8 10.3   MONOS PCT AUTO % 9.3 6.0   EOS PCT AUTO % 4.2 1.3       Results from last 7 days   Lab Units 03/12/24  0627 03/11/24  0515  03/10/24  1400   SODIUM mmol/L 136 133* 134*   POTASSIUM mmol/L 3.7 4.3 4.5   CHLORIDE mmol/L 101 102 100   CO2 mmol/L 22 20* 19*   BUN mg/dL 70* 63* 62*   CREATININE mg/dL 4.02* 3.58* 3.35*   GLUCOSE mg/dL 94 88 124*   CALCIUM mg/dL 8.5* 7.9* 8.6         XR chest 1 view   Final Result   1.  Cardiomegaly with tiny left basilar effusion.             MACRO:   None        Signed by: Joseph Suarez 3/11/2024 4:38 PM   Dictation workstation:   [id number]      US thoracentesis   Final Result   Uneventful thoracentesis, as detailed above. Right pleural space, 900   mL        I personally performed and/or directly supervised this study and was   present for the entire procedure.        Performed and dictated at [facility].        Signed by: Balbir Edmonds 3/11/2024 4:47 PM   Dictation workstation:   [id number]      Transthoracic Echo (TTE) Complete   Final Result      CT abdomen pelvis wo IV contrast   Final Result   Asymmetric right renal atrophy with diffuse cortical thinning. Mild   right hydronephrosis isolated to the upper pole as well as mildly   dilated right extrarenal pelvis. No right ureteral dilation.        Several varying sized left renal cysts including a posterior   interpolar 2 cm mildly complex cyst with small peripheral   calcification. No left hydroureteronephrosis.        Colonic diverticulosis without acute diverticulitis.        Enlarged prostate protruding toward the base of the urinary bladder.        Indeterminate 1 x 2.1 cm ovoid partially exophytic mildly   hyperattenuating lesion along the posterolateral margin of the right   hepatic lobe. Liver MRI could be considered for further   characterization.        Normal appendix. No bowel obstruction.        Partially visualized changes in the lower thorax as more fully   described on the recent chest CT of 03/10/2024.                  MACRO:   None.        Signed by: Pérez Spann 3/12/2024 10:13 AM   Dictation  workstation:   [id number]      CT chest wo IV contrast   Final Result   Large right and small left pleural effusion and mild bibasilar   atelectatic/consolidative opacities.        6 mm left lower lobe pulmonary nodule; follow-up CT chest is   recommended in 6 months to assess stability.        MACRO:   None        Signed by: Frandy Sutton 3/10/2024 7:37 PM   Dictation workstation:   [id number]      US renal complete   Final Result   Severe cortical thinning of right kidney and right hydronephrosis. CT   may be obtained to better assess the renal anatomy.        Multiple left renal cysts.        Prostatomegaly resulting in posterior mass effect on the urinary   bladder; please clinically correlate with PSA.        MACRO:   None        Signed by: Frandy Sutton 3/10/2024 6:45 PM   Dictation workstation:   [id number]      XR chest 2 views   Final Result   1.  Right basilar airspace opacification which may reflect pneumonia   in the appropriate clinical setting             Signed by: Tre Bronson 3/10/2024 2:01 PM   Dictation workstation:   [id number]      [facility] Lifevest    (Results Pending)            Assessment/Plan      Tom Haas is a 78-year-old male with a past medical history of coronary artery disease, carotid stenosis, hypertension x 20 years, LVH, hyperlipidemia and a history of G3A chronic kidney disease having a creatinine of 1.37 back in July 2020 when last checked.  He has been lost to follow-up and has not seen a physician in approximately 2 and a half years.  He has been off medication during this duration.  He presents with shortness of breath and hypertension.    In the ED, his BP was 207/114 mmHg with a heart rate of 115.  His high-sensitivity troponin was greater than than 3000 x 2, BNP of 2432, creatinine of 3.35.  ECG showed sinus tachycardia.  There was a large right and small left pleural effusion and a 6 mm left lower lobe pulmonary nodule on chest CT imaging.  Renal ultrasound imaging  showed a right kidney measuring 8.6 cm with hydronephrosis and a left kidney of 13.4 cm.  Multiple left renal cyst and severe cortical thinning on the right.  Prostate was enlarged.  Nephrology is consulted for renal care. He denies a history of obvious cancer, connective tissue disease, anti-inflammatory use or family history of ESRD.     Mr. Haas has severe and untreated hypertension with a background of G3 CKD nearly 4 years ago.  Question if this is acute kidney injury versus progressive chronic kidney disease. Favor the latter. 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 Repeat CT shows only mild hydronephrosis. He otherwise had a right thoracentesis with 0.9L removed. Fluid analysis is ordered. 2 D ECHO shows an EF of 30% with reduced RV function. Unfortunately given his poor renal function he will not receive GDMT for heart failure as well as an ischemic evaluation at the moment. His creatinine has trended higher as we have corrected his BP. There is nearly 3 gm of proteinuria. I will send a limited serologic work-up. 25 vitamin D, intact PTH and PSA were sent. There is no indication for renal replacement therapy.  Trend his RFP.  Will follow.        Principal Problem:    Hypertensive urgency  Active Problems:    Non-STEMI (non-ST elevated myocardial infarction) (CMS/HCC)    Acute heart failure (CMS/HCC)    Chronic renal impairment    Pneumonia due to infectious organism    CHF (congestive heart failure), NYHA class I, acute on chronic, combined (CMS/HCC)              I spent 50 minutes in the professional and overall care of this patient.      Anish Bazan, DO       [employer] Counseling:  I discussed with the patient the risks of mycophenolate mofetil including but not limited to infection/immunosuppression, GI upset, hypokalemia, hypercholesterolemia, bone marrow suppression, lymphoproliferative disorders, malignancy, GI ulceration/bleed/perforation, colitis, interstitial lung disease, kidney failure, progressive multifocal leukoencephalopathy, and birth defects.  The patient understands that monitoring is required including a baseline creatinine and regular CBC testing. In addition, patient must alert us immediately if symptoms of infection or other concerning signs are noted.

## 2024-03-12 NOTE — PROGRESS NOTES
"Subjective Data:          Objective Data:  Last Recorded Vitals:  Vitals:    24 0000 24 0430 24 0700 24 1115   BP: 134/81 139/83 143/80 122/72   BP Location: Right arm Right arm  Right arm   Patient Position: Lying Lying  Sitting   Pulse: 87 89  82   Resp: 18  19 17   Temp: 36.9 °C (98.4 °F) 36.8 °C (98.2 °F) 36.7 °C (98.1 °F) 36.6 °C (97.8 °F)   TempSrc: Oral Oral  Oral   SpO2: 96% 97% 96% 98%   Weight:       Height:         Medical Gas Therapy: None (Room air)  O2 Delivery Method: Nasal cannula  Weight  Av.6 kg (160 lb)  Min: 72.6 kg (160 lb)  Max: 72.6 kg (160 lb)    LABS:  CMP:  Results from last 7 days   Lab Units 24  0627 24  0515 03/10/24  1400   SODIUM mmol/L 136 133* 134*   POTASSIUM mmol/L 3.7 4.3 4.5   CHLORIDE mmol/L 101 102 100   CO2 mmol/L 22 20* 19*   ANION GAP mmol/L 17 15 20   BUN mg/dL 70* 63* 62*   CREATININE mg/dL 4.02* 3.58* 3.35*   EGFR mL/min/1.73m*2 14* 17* 18*   ALBUMIN g/dL 3.4  --  4.3   ALT U/L  --   --  18   AST U/L  --   --  23   BILIRUBIN TOTAL mg/dL  --   --  0.4     CBC:  Results from last 7 days   Lab Units 24  0627 03/10/24  1400   WBC AUTO x10*3/uL 8.4 10.2   HEMOGLOBIN g/dL 10.6* 12.7*   HEMATOCRIT % 32.3* 39.8*   PLATELETS AUTO x10*3/uL 154 194   MCV fL 87 89     COAG:   Results from last 7 days   Lab Units 24  1508   INR  1.2*     ABO: No results found for: \"ABO\"  HEME/ENDO:     CARDIAC:   Results from last 7 days   Lab Units 24  1146 24  1039 24  0515 03/10/24  1450 03/10/24  1400   TROPHS ng/L 2,324* 2,653* 4,619* 3,111* 3,071*   BNP pg/mL  --   --   --   --  2,432*      Results from last 7 days   Lab Units 24  0515   LDL CALC mg/dL 113*   VLDL mg/dL 22   CHOLESTEROL/HDL RATIO  4.5        Last I/O:    Intake/Output Summary (Last 24 hours) at 3/12/2024 1346  Last data filed at 3/11/2024 2100  Gross per 24 hour   Intake 120 ml   Output --   Net 120 ml     Net IO Since Admission: -856.67 mL [24 " 1346]            Inpatient Medications:  Scheduled medications   Medication Dose Route Frequency    aspirin  81 mg oral Daily    atorvastatin  20 mg oral Nightly    carvedilol  6.25 mg oral BID    doxycylcine  100 mg oral q12h RASHAWN    enoxaparin  30 mg subcutaneous q24h    furosemide  40 mg intravenous 2 times per day    isosorbide mononitrate ER  30 mg oral Daily    perflutren protein A microsphere  0.5 mL intravenous Once in imaging    polyethylene glycol  17 g oral Daily    sulfur hexafluoride microsphr  2 mL intravenous Once in imaging     PRN medications   Medication    hydrALAZINE     Continuous Medications   Medication Dose Last Rate           Physical Exam:  Gen Well appearing septuagenarian male sitting up in NAD. Body mass index is 26.63 kg/m².   CV rrr. 2/6 JM. No JVD or leg edema. Pulses 2+ and symmetric.    Pulm Lungs clear with normal respiratory effort.  Neuro Alert and conversant. Grossly nonfocal.         Assessment:  Acute systolic heart failure / Cardiomyopathy, NOS  Symptoms resolved now s/p thoracentesis and diuresis. EF 30-35%. Discussed increased risk of sudden death. Plan on Lifevest and reassessment in 3 months with referral for ICD placement if EF remains impaired. Coronary angiography deferred given severe renal dysfunction. Will proceed if/when renal function improves substantially or he is on HD. On Coreg. No plans for SGLT2-I/Aldactone/ACE/ARB/ARNI due to his significant renal dysfunction.    2. Aortic stenosis  Low flow / low gradient. Plan on a better assessment with DSE in the future when coronary angiography deferral is resolved as above.    3. Elevated troponin  Consistent with a non-MI troponin elevation / acute non-traumatic myocardial injury in the setting of uncontrolled hypertension, tachycardia, and poor renal function with underlying known obstructive CAD and sans chest discomfort. Core measures do not apply. Cannot rule out a type I process. Coronary angiography would  provide a definitive answer but would be problematic given his renal function and the risk / benefit is not in favor of pursuing on at this time.     3. Hypertension   BP markedly elevated on presentation but improved and acceptable as of late.     4. CAD  Some obstructive disease noted '05 in the Cx and non-obstructive disease elsewhere. Elevated troponin as above.       Recommendations   Diuretics to oral. Lifevest ordered. No cardiac barriers to discharge after fitting. Follow up with HF clinic in 1-2 weeks and with me next available (to be arranged by my ).        Kelvin Murphy MD

## 2024-03-12 NOTE — PROGRESS NOTES
Pharmacy Medication History Review    Tom Haas is a 78 y.o. male admitted for Hypertensive urgency. Pharmacy reviewed the patient's [id number] medications and allergies for accuracy.    The list below reflectives the updated PTA list. Please review each medication in order reconciliation for additional clarification and justification.  No medications prior to admission.       Spoke to the patient wife. Patient takes No Meds      The list below reflectives the updated allergy list. Please review each documented allergy for additional clarification and justification.  Allergies  Reviewed by Sun He RN on 3/11/2024   No Known Allergies         Below are additional concerns with the patient's PTA list.      Jasmina Aleman

## 2024-03-12 NOTE — CARE PLAN
The patient's goals for the shift include      The clinical goals for the shift include pt will remain safe throughout the shift

## 2024-03-12 NOTE — PROGRESS NOTES
"[facility]          Admitting Provider: Jerson Triplett MD Admission Date: 3/10/2024.   Attending Provider: Jerson Triplett MD MRN: [id number]       Subjective   Tom Haas is a 78 y.o. male on day 1 of admission presenting with Hypertensive urgency.  Interval History denies any complaints. No dyspnea or chest pain. Had 900 ml of thoracentesis yesterday.     Objective   Physical Exam  Last Recorded Vitals: Blood pressure 143/80, pulse 89, temperature 36.7 °C (98.1 °F), resp. rate 19, height 1.651 m (5' 5\"), weight 72.6 kg (160 lb), SpO2 96 %.  Patient Vitals for the past 24 hrs:   BP Temp Temp src Pulse Resp SpO2   03/12/24 0700 143/80 36.7 °C (98.1 °F) -- -- 19 96 %   03/12/24 0430 139/83 36.8 °C (98.2 °F) Oral 89 19 97 %   03/12/24 0000 134/81 36.9 °C (98.4 °F) Oral 87 18 96 %   03/11/24 1935 132/83 36.8 °C (98.2 °F) Oral 88 18 97 %   03/11/24 1735 149/79 -- -- -- -- --   03/11/24 1650 (!) 171/98 -- -- 100 17 99 %   03/11/24 1624 145/80 -- -- 90 16 98 %   03/11/24 1614 (!) 149/99 -- -- 54 18 98 %   03/11/24 1607 142/88 -- -- -- -- --   03/11/24 1606 -- -- -- 102 18 95 %   03/11/24 1159 (!) 150/93 36.6 °C (97.9 °F) Oral 94 18 99 %     Body mass index is 26.63 kg/m².  GENERAL: alert, cooperative, or no distress  SKIN: no rashes  NECK: supple, no thyromegaly, JVP within normal limits  LUNGS:  not in respiratory distress, respiratory rate normal, clear to auscultation  CARDIAC: regular rate and rhythm, normal S1 and S2, no murmur, rub, or gallop  ABDOMEN: Soft, non-tender, normal bowel sounds; no bruits, organomegaly or masses.  EXTREMITIES: No edema  NEURO: Alert and oriented x 3 , gait normal ., reflexes normal and symmetric, strength and  sensation grossly normal  Intake/Output last 3 Shifts:  I/O last 3 completed shifts:  In: 693.3 (9.6 mL/kg) [P.O.:360; I.V.:333.3 (4.6 mL/kg)]  Out: 1000 (13.8 mL/kg) [Urine:1000 (0.4 mL/kg/hr)]  Weight: 72.6 kg   DATA:   Diagnostic tests " reviewed for today's visit:    Most recent Labs  Results for orders placed or performed during the hospital encounter of 03/10/24 (from the past 24 hour(s))   Troponin I, High Sensitivity, Initial   Result Value Ref Range    Troponin I, High Sensitivity 2,653 (HH) 0 - 20 ng/L   Troponin, High Sensitivity, 1 Hour   Result Value Ref Range    Troponin I, High Sensitivity 2,324 (HH) 0 - 20 ng/L   Protein, Urine Random   Result Value Ref Range    Total Protein, Urine Random 121 (H) 5 - 25 mg/dL   Protime-INR   Result Value Ref Range    Protime 13.8 (H) 9.8 - 12.8 seconds    INR 1.2 (H) 0.9 - 1.1   Transthoracic Echo (TTE) Complete   Result Value Ref Range    AV pk cruz 2.73 m/s    AV mn grad 13.0 mmHg    LVOT diam 2.10 cm    LV biplane EF 37 %    MV E/A ratio 1.08     LA vol index A/L 61.6 ml/m2    Tricuspid annular plane systolic excursion 1.7 cm    LVIDd 5.00 cm    RVSP 29.6 mmHg    Aortic Valve Area by Continuity of VTI 0.98 cm2    Aortic Valve Area by Continuity of Peak Velocity 0.89 cm2    AV pk grad 29.8 mmHg    LV A4C EF 39.2    Sterile Fluid Culture/Smear    Specimen: Pleural; Fluid   Result Value Ref Range    Gram Stain No polymorphonuclear leukocytes seen     Gram Stain No organisms seen    Albumin, Fluid   Result Value Ref Range    Albumin, Fluid 1.3 Not established g/dL   Amylase, Fluid   Result Value Ref Range    Amylase, Fluid 19 Not established. U/L   Body Fluid Cell Count   Result Value Ref Range    Color, Fluid Straw Colorless, Straw, Yellow    Clarity, Fluid Clear Clear    WBC, Fluid 408 See Comment /uL    RBC, Fluid 2,000 0  /uL /uL   Body Fluid Differential   Result Value Ref Range    Neutrophils %, Manual, Fluid 24 <25 % %    Lymphocytes %, Manual, Fluid 31 <75 % %    Mono/Macrophages %, Manual, Fluid 45 <70 % %    Eosinophils %, Manual, Fluid 0 0 % %    Basophils %, Manual, Fluid 0 0 % %    Immature Granulocytes %, Manual, Fluid 0 0 % %    Blasts %, Manual, Fluid 0 0 % %    Unclassified Cells %,  "Manual, Fluid 0 0 % %    Plasma Cells %, Manual, Fluid 0 0 % %    Total Cells Counted, Fluid 100    Lactate Dehydrogenase, Fluid   Result Value Ref Range    LD, Fluid 68 Not established. U/L   Glucose, Fluid   Result Value Ref Range    Glucose, Fluid 120 Not established mg/dL   Protein, Total Fluid   Result Value Ref Range    Protein, Total Fluid 1.9 Not established g/dL   CBC and Auto Differential   Result Value Ref Range    WBC 8.4 4.4 - 11.3 x10*3/uL    nRBC 0.0 0.0 - 0.0 /100 WBCs    RBC 3.71 (L) 4.50 - 5.90 x10*6/uL    Hemoglobin 10.6 (L) 13.5 - 17.5 g/dL    Hematocrit 32.3 (L) 41.0 - 52.0 %    MCV 87 80 - 100 fL    MCH 28.6 26.0 - 34.0 pg    MCHC 32.8 32.0 - 36.0 g/dL    RDW 13.9 11.5 - 14.5 %    Platelets 154 150 - 450 x10*3/uL    Neutrophils % 73.8 40.0 - 80.0 %    Immature Granulocytes %, Automated 0.4 0.0 - 0.9 %    Lymphocytes % 11.8 13.0 - 44.0 %    Monocytes % 9.3 2.0 - 10.0 %    Eosinophils % 4.2 0.0 - 6.0 %    Basophils % 0.5 0.0 - 2.0 %    Neutrophils Absolute 6.21 (H) 1.60 - 5.50 x10*3/uL    Immature Granulocytes Absolute, Automated 0.03 0.00 - 0.50 x10*3/uL    Lymphocytes Absolute 0.99 0.80 - 3.00 x10*3/uL    Monocytes Absolute 0.78 0.05 - 0.80 x10*3/uL    Eosinophils Absolute 0.35 0.00 - 0.40 x10*3/uL    Basophils Absolute 0.04 0.00 - 0.10 x10*3/uL   Renal function panel   Result Value Ref Range    Glucose 94 74 - 99 mg/dL    Sodium 136 136 - 145 mmol/L    Potassium 3.7 3.5 - 5.3 mmol/L    Chloride 101 98 - 107 mmol/L    Bicarbonate 22 21 - 32 mmol/L    Anion Gap 17 10 - 20 mmol/L    Urea Nitrogen 70 (H) 6 - 23 mg/dL    Creatinine 4.02 (H) 0.50 - 1.30 mg/dL    eGFR 14 (L) >60 mL/min/1.73m*2    Calcium 8.5 (L) 8.6 - 10.3 mg/dL    Phosphorus 4.9 2.5 - 4.9 mg/dL    Albumin 3.4 3.4 - 5.0 g/dL     Blood Culture   Date Value Ref Range Status   03/10/2024 No growth at 1 day  Preliminary   03/10/2024 No growth at 1 day  Preliminary    No results found for: \"RESPCULTSM\" No results found for: \"PERDIAFLDCUL\" " "No results found for: \"STERFLDCULSM\"   ECG 12 lead    Result Date: 3/11/2024  Sinus tachycardia with Premature atrial complexes Left ventricular hypertrophy with repolarization abnormality ( R in aVL , Rui product ) Abnormal ECG When compared with ECG of 12-NOV-2012 10:59, Premature atrial complexes are now Present Vent. rate has increased BY  48 BPM QRS axis Shifted left ST elevation now present in Anterior leads ST now depressed in Lateral leads T wave inversion now evident in Lateral leads See ED provider note for full interpretation and clinical correlation Confirmed by Vanesa Peterson ([id number]) on 3/11/2024 5:21:40 PM    US thoracentesis    Result Date: 3/11/2024  Interpreted By:  Balbir Edmonds, STUDY: US THORACENTESIS3/11/15111:38 pm   INDICATION: Signs/Symptoms:Thoracentesisright sided pleural effusion   COMPARISON: CT chest 3/11/2024   ACCESSION NUMBER(S): [id number]   ORDERING CLINICIAN: PHAM ROMERO   TECHNIQUE: INTERVENTIONALIST(S): Balbir Edmonds   CONSENT: The patient/patient's POA/next of kin was informed of the nature of the proposed procedure. The purposes, alternatives, risks, and benefits were explained and discussed. All questions were answered and consent was obtained.   SEDATION: None   MEDICATION/CONTRAST: 1% lidocaine was used to anesthetize subcutaneously.   TIME OUT: A time out was performed immediately prior to procedure start with the interventional team, correctly identifying the patient name, date of birth, MRN, procedure, anatomy (including marking of site and side), patient position, procedure consent form, relevant laboratory and imaging test results, antibiotic administration, safety precautions, and procedure-specific equipment needs.   FINDINGS: The patient was placed in the sitting position.   The pleural space was examined with grey scale ultrasound, and the most accessible fluid identified and marked for thoracentesis.   The skin was prepped and draped in usual " manner. Local anesthesia with lidocaine was administered and a right-sided thoracentesis was performed.  A 5 Croatian One-Step Valved thoracentesis needle/catheter was then placed where marked. Approximately 900 mL of yellowish colored fluid was removed.  The needle/catheter was then withdrawn.   The patient tolerated the procedure well and there were no immediate complications. Specimen(s) sent to the laboratory for further evaluation, per the requesting team.       Uneventful thoracentesis, as detailed above. Right pleural space, 900 mL   I personally performed and/or directly supervised this study and was present for the entire procedure.   Performed and dictated at [facility].   Signed by: Alon Rodriguez 3/11/2024 4:47 PM Dictation workstation:   [id number]    XR chest 1 view    Result Date: 3/11/2024  Interpreted By:  Joseph Suarez, STUDY: XR CHEST 1 VIEW;  3/11/2024 4:32 pm   INDICATION: Signs/Symptoms:post right thoracentesis.   COMPARISON: 03/10/2024   ACCESSION NUMBER(S): [id number]   ORDERING CLINICIAN: ALON RODRIGUEZ   FINDINGS: AP portable view of the chest is obtained.  Limited exam due to portable nature. Magnified cardiac silhouette. Mild blunting of the left costophrenic angle likely due to a small effusion. The lungs are otherwise clear. No pneumothorax.       1.  Cardiomegaly with tiny left basilar effusion.     MACRO: None   Signed by: Joseph Suarez 3/11/2024 4:38 PM Dictation workstation:   [id number]    Transthoracic Echo (TTE) Complete    Result Date: 3/11/2024   [facility], [address] [place] [address]              Tel [phone] and Fax [phone] TRANSTHORACIC ECHOCARDIOGRAM REPORT  Patient Name:      JACK Maciel Physician:    [id number] Kelvin Murphy MD Study Date:        3/11/2024            Ordering Provider:    Tony NATH                                                                HEATHER MRN/PID:           [id number]             Fellow: Accession#:        [id number]         Nurse: Date of Birth/Age: 1945 / 78 years Sonographer:          Josephine Deutsch RDCS Gender:            M                    Additional Staff: Height:            165.00 cm            Admit Date:           3/10/2024 Weight:            72.00 kg             Admission Status:     Inpatient -                                                               Routine BSA / BMI:         1.79 m2 / 26.45      Encounter#:           [id number]                    kg/m2                                         [facility] Blood Pressure: 154 /97 mmHg Study Type:    TRANSTHORACIC ECHO (TTE) COMPLETE Diagnosis/ICD: Unspecified systolic (congestive) heart failure (CHF)-I50.20 Indication:    Congestive Heart Failure Patient History: Pertinent History: CHF. NSTEMI. Study Detail: The following Echo studies were performed: 2D, M-Mode, Doppler and               color flow. Technically challenging study due to body habitus.               Definity used as a contrast agent for endocardial border               definition. Total contrast used for this procedure was 3 mL via IV               push.  PHYSICIAN INTERPRETATION: Left Ventricle: The left ventricular systolic function is severely decreased, with an estimated ejection fraction of 30-35%. There is global hypokinesis of the left ventricle with minor regional variations. The left ventricular cavity size is normal. There is mild to moderate concentric left ventricular hypertrophy. Spectral Doppler shows an abnormal pattern of left ventricular diastolic filling. Left Atrium: The left atrium is severely dilated. Right Ventricle: The right ventricle is normal in size. There is reduced right ventricular systolic function. Right Atrium: The right atrium is mild to moderately dilated.  Aortic Valve: The aortic valve is probably trileaflet. There is moderate aortic valve cusp calcification. There is There is reduced systolic aortic valve leaflet excursion. There is no evidence of aortic valve regurgitation. The peak instantaneous gradient of the aortic valve is 29.8 mmHg. The mean gradient of the aortic valve is 13.0 mmHg. There is low flow, low gradient aortic stenosis present. Mitral Valve: The mitral valve is normal in structure. There is mild mitral valve regurgitation which is centrally directed. Tricuspid Valve: The tricuspid valve is structurally normal. There is mild tricuspid regurgitation. The Doppler estimated RVSP is within normal limits at 29.6 mmHg. Pulmonic Valve: The pulmonic valve is structurally normal. There is trace pulmonic valve regurgitation. Pericardium: There is no pericardial effusion noted. Aorta: The aortic root is normal. Systemic Veins: The inferior vena cava appears to be of normal size. In comparison to the previous echocardiogram(s): There are no prior studies on this patient for comparison purposes.  CONCLUSIONS:  1. Left ventricular systolic function is severely decreased with a 30-35% estimated ejection fraction.  2. Spectral Doppler shows an abnormal pattern of left ventricular diastolic filling.  3. There is reduced right ventricular systolic function.  4. The left atrium is severely dilated.  5. The right atrium is mild to moderately dilated.  6. RVSP within normal limits.  7. There is low flow, low gradient aortic stenosis present.  8. There is moderate aortic valve cusp calcification.  9. There is global hypokinesis of the left ventricle with minor regional variations. QUANTITATIVE DATA SUMMARY: 2D MEASUREMENTS:                           Normal Ranges: LAs:           4.00 cm    (2.7-4.0cm) IVSd:          1.20 cm    (0.6-1.1cm) LVPWd:         1.20 cm    (0.6-1.1cm) LVIDd:         5.00 cm    (3.9-5.9cm) LVIDs:         4.20 cm LV Mass Index: 130.4 g/m2 LV % FS         16.0 % LA VOLUME:                               Normal Ranges: LA Vol A4C:        98.5 ml    (22+/-6mL/m2) LA Vol A2C:        112.0 ml LA Vol BP:         110.4 ml LA Vol Index A4C:  55.0ml/m2 LA Vol Index A2C:  62.5 ml/m2 LA Vol Index BP:   61.6 ml/m2 LA Area A4C:       26.5 cm2 LA Area A2C:       26.9 cm2 LA Major Axis A4C: 6.1 cm LA Major Axis A2C: 5.5 cm LA Volume Index:   61.6 ml/m2 RA VOLUME BY A/L METHOD:                       Normal Ranges: RA Area A4C: 25.4 cm2 AORTA MEASUREMENTS:                      Normal Ranges: Ao Sinus, d: 2.68 cm (2.1-3.5cm) Ao STJ, d:   1.95 cm (1.7-3.4cm) Asc Ao, d:   3.05 cm (2.1-3.4cm) LV SYSTOLIC FUNCTION BY 2D PLANIMETRY (MOD):                     Normal Ranges: EF-A4C View: 39.2 % (>=55%) EF-A2C View: 34.6 % EF-Biplane:  37.3 % LV DIASTOLIC FUNCTION:                               Normal Ranges: MV Peak E:        0.61 m/s    (0.7-1.2 m/s) MV Peak A:        0.57 m/s    (0.42-0.7 m/s) E/A Ratio:        1.08        (1.0-2.2) MV A Dur:         111.00 msec PulmV Sys Ruben:    27.30 cm/s PulmV Abrams Ruben:   32.00 cm/s PulmV S/D Ruben:    0.90 PulmV A Revs Ruben: 13.40 cm/s PulmV A Revs Dur: 114.00 msec MITRAL VALVE:                 Normal Ranges: MV DT: 106 msec (150-240msec) AORTIC VALVE:                                    Normal Ranges: AoV Vmax:                2.73 m/s  (<=1.7m/s) AoV Peak P.8 mmHg (<20mmHg) AoV Mean P.0 mmHg (1.7-11.5mmHg) LVOT Max Ruben:            0.70 m/s  (<=1.1m/s) AoV VTI:                 42.60 cm  (18-25cm) LVOT VTI:                12.10 cm LVOT Diameter:           2.10 cm   (1.8-2.4cm) AoV Area, VTI:           0.98 cm2  (2.5-5.5cm2) AoV Area,Vmax:           0.89 cm2  (2.5-4.5cm2) AoV Dimensionless Index: 0.28  RIGHT VENTRICLE: RV Basal 4.27 cm RV Mid   3.11 cm RV Major 7.7 cm TAPSE:   16.6 mm TRICUSPID VALVE/RVSP:                             Normal Ranges: Peak TR Velocity: 2.58 m/s Est. RA Pressure: 3 mmHg RV Syst Pressure:  29.6 mmHg (< 30mmHg) IVC Diam:         1.86 cm PULMONIC VALVE:                         Normal Ranges: PV Accel Time: 58 msec  (>120ms) PV Max Ruben:    1.4 m/s  (0.6-0.9m/s) PV Max P.6 mmHg Pulmonary Veins: PulmV A Revs Dur: 114.00 msec PulmV A Revs Ruben: 13.40 cm/s PulmV Abrams Ruben:   32.00 cm/s PulmV S/D Ruben:    0.90 PulmV Sys Ruben:    27.30 cm/s  [id number] Kelvin Murphy MD Electronically signed on 3/11/2024 at 3:46:31 PM  ** Final **     CT chest wo IV contrast    Result Date: 3/10/2024  Interpreted By:  Frandy Sutton, STUDY: CT CHEST WO IV CONTRAST;  3/10/2024 7:15 pm   INDICATION: Signs/Symptoms:pleural effusion.   COMPARISON: None.   ACCESSION NUMBER(S): [id number]   ORDERING CLINICIAN: PHAM ROMERO   TECHNIQUE: Contiguous axial images of the chest were obtained without intravenous contrast. Coronal and sagittal reformatted images were obtained from the axial images.   FINDINGS: The examination is limited secondary of intravenous contrast.   No axillary lymphadenopathy. The AP window lymph nodes measure up to 10 mm. Paratracheal lymph nodes measure up to 15 mm. 14 mm and 19 mm subcarinal lymph nodes. There is limited evaluation for hilar lymphadenopathy on noncontrast examination.   There is cardiomegaly. Coronary artery calcifications. No significant pericardial effusion.   There is large right and small left pleural effusion and mild bibasilar atelectatic/consolidative opacities. 6 mm left lower lobe pulmonary nodule. No pneumothorax.   Limited evaluation of the upper abdomen. Severe cortical thinning and hydronephrosis partially imaged right kidney. Partially imaged left renal cysts.   Multilevel degenerative change of the thoracic spine.       Large right and small left pleural effusion and mild bibasilar atelectatic/consolidative opacities.   6 mm left lower lobe pulmonary nodule; follow-up CT chest is recommended in 6 months to assess stability.   MACRO: None   Signed by: Frandy Sutton 3/10/2024  7:37 PM Dictation workstation:   [id number]    US renal complete    Result Date: 3/10/2024  Interpreted By:  Frandy Sutton, STUDY: US RENAL COMPLETE;  3/10/2024 5:52 pm   INDICATION: Signs/Symptoms:ckd.   COMPARISON: None.   ACCESSION NUMBER(S): [id number]   ORDERING CLINICIAN: PHAM ROMERO   TECHNIQUE: Multiple sonographic grayscale and color images of the kidneys were performed.   FINDINGS: The examination is limited secondary to patient body habitus.   The right kidney measures 8.6 cm in length. There is severe cortical thinning of the right kidney and right hydronephrosis.   The left kidney measures 13.4 cm in length. There are multiple left renal cysts with the largest cyst measuring 2.9 cm. A 2 cm cyst in the left kidney demonstrates thin internal septation. There is no evidence of significant left hydronephrosis.   The urinary bladder is underdistended and not well evaluated. There is prostatomegaly resulting in posterior mass effect on the urinary bladder.       Severe cortical thinning of right kidney and right hydronephrosis. CT may be obtained to better assess the renal anatomy.   Multiple left renal cysts.   Prostatomegaly resulting in posterior mass effect on the urinary bladder; please clinically correlate with PSA.   MACRO: None   Signed by: Frandy Sutton 3/10/2024 6:45 PM Dictation workstation:   [id number]    XR chest 2 views    Result Date: 3/10/2024  Interpreted By:  Tre Bronson, STUDY: XR CHEST 2 VIEWS;  3/10/2024 1:46 pm   INDICATION: Signs/Symptoms:SOB.   COMPARISON: None.   ACCESSION NUMBER(S): [id number]   ORDERING CLINICIAN: DWAIN SIBLEY   FINDINGS:     CARDIOMEDIASTINAL SILHOUETTE: Cardiomediastinal silhouette is unchanged.   LUNGS: Right basilar airspace opacification. No pneumothorax. Small right-sided effusion.   ABDOMEN: No remarkable upper abdominal findings.   BONES: No acute osseous changes.   Remaining findings appear stable since prior comparison radiograph.        1.  Right basilar airspace opacification which may reflect pneumonia in the appropriate clinical setting     Signed by: Tre Bronson 3/10/2024 2:01 PM Dictation workstation:   [id number]    Current Facility-Administered Medications   Medication Dose Route Frequency Provider Last Rate Last Admin    amLODIPine (Norvasc) tablet 10 mg  10 mg oral Daily JEAN CARLOS Cotton   10 mg at 03/11/24 1204    aspirin chewable tablet 81 mg  81 mg oral Daily Jerson Triplett MD   81 mg at 03/11/24 0839    atorvastatin (Lipitor) tablet 20 mg  20 mg oral Nightly JEAN CARLOS Cotton   20 mg at 03/11/24 2042    azithromycin (Zithromax) in dextrose 5 % in water (D5W) 250 mL  mg  500 mg intravenous q24h Jerson Triplett MD   Stopped at 03/11/24 1825    cefTRIAXone (Rocephin) in dextrose 5 % water (D5W) 50 mL IV 2 g  2 g intravenous q24h Jerson Triplett MD   Stopped at 03/11/24 1812    enoxaparin (Lovenox) syringe 30 mg  30 mg subcutaneous q24h Jerson Triplett MD   30 mg at 03/11/24 1743    furosemide (Lasix) injection 80 mg  80 mg intravenous 2 times per day JEAN CARLOS Cotton   80 mg at 03/11/24 1823    hydrALAZINE (Apresoline) injection 5 mg  5 mg intravenous q4h PRN Jerson Triplett MD   5 mg at 03/10/24 1612    perflutren protein A microsphere (Optison) injection 0.5 mL  0.5 mL intravenous Once in imaging Jerson Triplett MD        polyethylene glycol (Glycolax, Miralax) packet 17 g  17 g oral Daily Jerson Triplett MD   17 g at 03/11/24 0839    sulfur hexafluoride microsphr (Lumason) injection 24.28 mg  2 mL intravenous Once in imaging Jerson Triplett MD         No current outpatient medications on file.   ..     Medication and Non-Pharmacologic VTE Prophylaxis/Anticoagulants      Last Anticoag Admin            enoxaparin (Lovenox) syringe 30 mg    Given 30 mg at 03/11/24 1743    Frequency: Every 24 hours         There are additional administrations since 03/09/24  0759 that are not shown.    No unadministered anticoagulant orders found.          Assessment/Plan   Tom Haas has  Principal Problem:    Hypertensive urgency  Active Problems:    Non-MI Troponin elevation    Acute heart failure (CMS/HCC) (EF 30-35 %    Chronic renal impairment    Pneumonia due to infectious organism    CHF (congestive heart failure), NYHA class I, acute on chronic, combined (CMS/[facility])    Hydronephrosis (Rt)   improved.    ASA, Statin, Cardiology consult   Lasix  Add hydralazine  Urology consult   empiric antibiotics.   Other Hospital problems        Abnormal findings not addressed during hospitalization, but require out patient follow up. Lung nodules        I spent 35 minutes talking and examining Tom Haas, reviewing the labs & medications, formulating plan of care & discussing with NP and nursing staff.      Jerson Triplett MD

## 2024-03-13 VITALS
BODY MASS INDEX: 26.66 KG/M2 | HEART RATE: 85 BPM | WEIGHT: 160 LBS | RESPIRATION RATE: 18 BRPM | HEIGHT: 65 IN | TEMPERATURE: 96.5 F | DIASTOLIC BLOOD PRESSURE: 62 MMHG | OXYGEN SATURATION: 97 % | SYSTOLIC BLOOD PRESSURE: 108 MMHG

## 2024-03-13 LAB
ALBUMIN MFR UR ELPH: 74.8 %
ALDOST SERPL-MCNC: 20.2 NG/DL
ALDOST/RENIN PLAS-RTO: 6.7 RATIO
ALPHA1 GLOB MFR UR ELPH: 4.9 %
ALPHA2 GLOB MFR UR ELPH: 4.7 %
ANA SER QL HEP2 SUBST: NEGATIVE
B-GLOBULIN MFR UR ELPH: 8.9 %
BASOPHILS # BLD AUTO: 0.03 X10*3/UL (ref 0–0.1)
BASOPHILS NFR BLD AUTO: 0.4 %
EOSINOPHIL # BLD AUTO: 0.29 X10*3/UL (ref 0–0.4)
EOSINOPHIL NFR BLD AUTO: 4.3 %
ERYTHROCYTE [DISTWIDTH] IN BLOOD BY AUTOMATED COUNT: 13.4 % (ref 11.5–14.5)
GAMMA GLOB MFR UR ELPH: 6.7 %
HBV SURFACE AG SERPL QL IA: NONREACTIVE
HCT VFR BLD AUTO: 30.5 % (ref 41–52)
HCV AB SER QL: NONREACTIVE
HGB BLD-MCNC: 10.7 G/DL (ref 13.5–17.5)
IMM GRANULOCYTES # BLD AUTO: 0.02 X10*3/UL (ref 0–0.5)
IMM GRANULOCYTES NFR BLD AUTO: 0.3 % (ref 0–0.9)
LYMPHOCYTES # BLD AUTO: 0.73 X10*3/UL (ref 0.8–3)
LYMPHOCYTES NFR BLD AUTO: 10.7 %
MCH RBC QN AUTO: 29.2 PG (ref 26–34)
MCHC RBC AUTO-ENTMCNC: 35.1 G/DL (ref 32–36)
MCV RBC AUTO: 83 FL (ref 80–100)
MONOCYTES # BLD AUTO: 0.88 X10*3/UL (ref 0.05–0.8)
MONOCYTES NFR BLD AUTO: 12.9 %
NEUTROPHILS # BLD AUTO: 4.87 X10*3/UL (ref 1.6–5.5)
NEUTROPHILS NFR BLD AUTO: 71.4 %
NRBC BLD-RTO: 0 /100 WBCS (ref 0–0)
PATH REVIEW-URINE PROTEIN ELECTROPHORESIS: NORMAL
PLATELET # BLD AUTO: 159 X10*3/UL (ref 150–450)
RBC # BLD AUTO: 3.67 X10*6/UL (ref 4.5–5.9)
RENIN PLAS-CCNC: 3 NG/ML/HR
URINE ELECTROPHORESIS COMMENT: NORMAL
WBC # BLD AUTO: 6.8 X10*3/UL (ref 4.4–11.3)

## 2024-03-13 PROCEDURE — 2500000001 HC RX 250 WO HCPCS SELF ADMINISTERED DRUGS (ALT 637 FOR MEDICARE OP): Performed by: INTERNAL MEDICINE

## 2024-03-13 PROCEDURE — 36415 COLL VENOUS BLD VENIPUNCTURE: CPT | Performed by: INTERNAL MEDICINE

## 2024-03-13 PROCEDURE — 87340 HEPATITIS B SURFACE AG IA: CPT | Mod: AHULAB | Performed by: INTERNAL MEDICINE

## 2024-03-13 PROCEDURE — 86803 HEPATITIS C AB TEST: CPT | Mod: AHULAB | Performed by: INTERNAL MEDICINE

## 2024-03-13 PROCEDURE — 85025 COMPLETE CBC W/AUTO DIFF WBC: CPT | Performed by: INTERNAL MEDICINE

## 2024-03-13 PROCEDURE — 2500000004 HC RX 250 GENERAL PHARMACY W/ HCPCS (ALT 636 FOR OP/ED): Performed by: INTERNAL MEDICINE

## 2024-03-13 RX ORDER — CALCITRIOL 1 UG/ML
0.5 INJECTION INTRAVENOUS 3 TIMES WEEKLY
Status: DISCONTINUED | OUTPATIENT
Start: 2024-03-13 | End: 2024-03-13 | Stop reason: HOSPADM

## 2024-03-13 RX ORDER — HYDRALAZINE HYDROCHLORIDE 10 MG/1
10 TABLET, FILM COATED ORAL 2 TIMES DAILY
Qty: 60 TABLET | Refills: 1 | Status: SHIPPED | OUTPATIENT
Start: 2024-03-13 | End: 2024-05-13 | Stop reason: SDUPTHER

## 2024-03-13 RX ORDER — HYDRALAZINE HYDROCHLORIDE 10 MG/1
10 TABLET, FILM COATED ORAL 2 TIMES DAILY
Qty: 60 TABLET | Refills: 0 | Status: CANCELLED | OUTPATIENT
Start: 2024-03-13

## 2024-03-13 RX ORDER — CHOLECALCIFEROL (VITAMIN D3) 50 MCG
2000 TABLET ORAL DAILY
Qty: 30 TABLET | Refills: 1 | Status: SHIPPED | OUTPATIENT
Start: 2024-03-14

## 2024-03-13 RX ORDER — HYDRALAZINE HYDROCHLORIDE 10 MG/1
10 TABLET, FILM COATED ORAL 2 TIMES DAILY
Status: DISCONTINUED | OUTPATIENT
Start: 2024-03-13 | End: 2024-03-13 | Stop reason: HOSPADM

## 2024-03-13 RX ORDER — DOXYCYCLINE 100 MG/1
100 CAPSULE ORAL EVERY 12 HOURS SCHEDULED
Qty: 10 CAPSULE | Refills: 0 | Status: SHIPPED | OUTPATIENT
Start: 2024-03-13 | End: 2024-03-21 | Stop reason: ALTCHOICE

## 2024-03-13 RX ORDER — CHOLECALCIFEROL (VITAMIN D3) 25 MCG
2000 TABLET ORAL DAILY
Status: DISCONTINUED | OUTPATIENT
Start: 2024-03-13 | End: 2024-03-13 | Stop reason: HOSPADM

## 2024-03-13 RX ORDER — ISOSORBIDE MONONITRATE 30 MG/1
30 TABLET, EXTENDED RELEASE ORAL DAILY
Qty: 30 TABLET | Refills: 0 | Status: SHIPPED | OUTPATIENT
Start: 2024-03-14 | End: 2024-04-10 | Stop reason: SDUPTHER

## 2024-03-13 RX ORDER — CARVEDILOL 6.25 MG/1
6.25 TABLET ORAL 2 TIMES DAILY
Qty: 60 TABLET | Refills: 1 | Status: SHIPPED | OUTPATIENT
Start: 2024-03-13 | End: 2024-05-13 | Stop reason: SDUPTHER

## 2024-03-13 RX ORDER — NAPROXEN SODIUM 220 MG/1
81 TABLET, FILM COATED ORAL DAILY
Qty: 30 TABLET | Refills: 1 | Status: SHIPPED | OUTPATIENT
Start: 2024-03-14

## 2024-03-13 RX ORDER — ATORVASTATIN CALCIUM 20 MG/1
20 TABLET, FILM COATED ORAL NIGHTLY
Qty: 30 TABLET | Refills: 1 | Status: SHIPPED | OUTPATIENT
Start: 2024-03-13 | End: 2024-05-13 | Stop reason: SDUPTHER

## 2024-03-13 RX ADMIN — HYDRALAZINE HYDROCHLORIDE 10 MG: 10 TABLET, FILM COATED ORAL at 11:03

## 2024-03-13 RX ADMIN — CALCITRIOL 0.5 MCG: 1 INJECTION INTRAVENOUS at 13:08

## 2024-03-13 RX ADMIN — CARVEDILOL 6.25 MG: 6.25 TABLET, FILM COATED ORAL at 08:16

## 2024-03-13 RX ADMIN — Medication 2000 UNITS: at 13:49

## 2024-03-13 RX ADMIN — ASPIRIN 81 MG CHEWABLE TABLET 81 MG: 81 TABLET CHEWABLE at 08:16

## 2024-03-13 RX ADMIN — DOXYCYCLINE HYCLATE 100 MG: 100 TABLET, COATED ORAL at 08:16

## 2024-03-13 RX ADMIN — ISOSORBIDE MONONITRATE 30 MG: 30 TABLET, EXTENDED RELEASE ORAL at 08:16

## 2024-03-13 ASSESSMENT — COGNITIVE AND FUNCTIONAL STATUS - GENERAL
DAILY ACTIVITIY SCORE: 24
DAILY ACTIVITIY SCORE: 24
MOBILITY SCORE: 24
MOBILITY SCORE: 24

## 2024-03-13 ASSESSMENT — PAIN SCALES - GENERAL
PAINLEVEL_OUTOF10: 0 - NO PAIN
PAINLEVEL_OUTOF10: 0 - NO PAIN

## 2024-03-13 ASSESSMENT — PAIN - FUNCTIONAL ASSESSMENT: PAIN_FUNCTIONAL_ASSESSMENT: 0-10

## 2024-03-13 NOTE — DISCHARGE INSTRUCTIONS
Reason for Admission:    Principal Problem:    Hypertensive urgency  Active Problems:    Acute heart failure (CMS/[facility])    Chronic renal impairment    Pneumonia due to infectious organism    CHF (congestive heart failure), NYHA class I, acute on chronic, combined (CMS/[facility])      Hospital course    Tom Haas      Performed Procedures & Surgeries    PROCEDURES PROCEDURES DURING HOSPITALISATION: Thoracentesis       OPERATIONS PERFORMED DURING ADMISSION: none      Current Planned Discharge Disposition: Home              OME  FOLLOW UP WITH: primary physician 1 week    Outpatient Follow-Up  Future Appointments   Date Time Provider Department Center   3/20/2024 12:30 PM [facility] [id number] CARD1 ROOM [id number] [place]     MD Kyler Neely MD  You have to follow up with Kyler Corbin MD in    Home going medications:    Current Discharge Medication List        START taking these medications    Details   aspirin 81 mg chewable tablet Chew 1 tablet (81 mg) once daily. Do not start before March 14, 2024.  Qty: 30 tablet, Refills: 1    Associated Diagnoses: Acute heart failure, unspecified heart failure type (CMS/[facility])      atorvastatin (Lipitor) 20 mg tablet Take 1 tablet (20 mg) by mouth once daily at bedtime.  Qty: 30 tablet, Refills: 1    Associated Diagnoses: Acute heart failure, unspecified heart failure type (CMS/[facility])      carvedilol (Coreg) 6.25 mg tablet Take 1 tablet (6.25 mg) by mouth 2 times a day.  Qty: 60 tablet, Refills: 1    Associated Diagnoses: Acute heart failure, unspecified heart failure type (CMS/HCC)      cholecalciferol (Vitamin D-3) 50 MCG (2000 UT) tablet Take 1 tablet (2,000 Units) by mouth once daily. Do not start before March 14, 2024.  Qty: 30 tablet, Refills: 1    Associated Diagnoses: Chronic renal impairment, stage 4 (severe) (CMS/[facility])      doxycycline (Vibramycin) 100 mg capsule Take 1 capsule (100 mg) by mouth every 12 hours. Take with a full glass of water  and do not lie down for at least 30 minutes after.  Qty: 10 capsule, Refills: 0    Associated Diagnoses: Pneumonia due to infectious organism, unspecified laterality, unspecified part of lung      hydrALAZINE (Apresoline) 10 mg tablet Take 1 tablet (10 mg) by mouth 2 times a day.  Qty: 60 tablet, Refills: 1    Associated Diagnoses: Acute heart failure, unspecified heart failure type (CMS/HCC)      isosorbide mononitrate ER (Imdur) 30 mg 24 hr tablet Take 1 tablet (30 mg) by mouth once daily. Do not crush or chew. Do not start before March 14, 2024.  Qty: 30 tablet, Refills: 0    Associated Diagnoses: Acute heart failure, unspecified heart failure type (CMS/HCC)           Current Discharge Medication List        Current Discharge Medication List          There is 6 mm left lower lobe nodule in the lung.  You need a follow up CAT scan of lung in 6 months

## 2024-03-13 NOTE — TREATMENT PLAN
Patient was fitted for LifeVest  Follow up at CHF clinic arranged for 3/20/2024    Continue GDMT with Coreg 6.25 mg BID, Hydralazine 10 mg BID and Imdur 30 mg daily.    No plans for SGLT2-I/Aldactone/ACE/ARB/ARNI due to his significant renal dysfunction.   Can resume oral furosemide 80 mg daily starting tomorrow  Benefits from BMP in 1 week     No cardiac barriers to discharge  Cardiology will sign off  Please call with questions

## 2024-03-13 NOTE — NURSING NOTE
Met with patient and his wife.  EF 30-35%.  SOB with Activity. NYHA Class 3. Waiting for Lifevest.   Discussed CHF, signs and symptoms, and when to call cardiologist. Reinforced the importance of following a Low Sodium Diet and monitoring daily weight, lower leg edema, and shortness of breath. Reviewed importance of taking prescribed medications after discharge.   HEART FAILURE EDUCATION:  1. Weigh yourself daily and record on your weight log.  2. If you gain more than 2 or 3 pounds overnight, call your cardiologist.  3. Follow a low sodium diet. No more than 2000 mg in one day, or more than 650 mg per meal.  4. Limit total fluids to no more than 8 cups (or 2 liters) per day - this includes all fluids (water, coffee, juice, milk, tea, etc.)  5. Monitor your blood pressure daily and record on your weight log.  6. Call to schedule your follow-up appointments when you get home if they were not already scheduled for you.  7. Keep your follow-up appointments! Bring your weight log with you so the doctors can see your weight trend and blood pressure readings.  8. Be sure to  any new prescriptions and take them as directed. If unsure of the medications, be sure to call your cardiologist.  9. Stay as active as you can tolerate.   10. If you notice subtle change of symptoms (slight increase in swelling, slight shortness of breath, a new intolerance to laying flat, a new cough), be sure to call your cardiologist.   You have been referred to [facility].  It is located in the [facility] at [address].  If you need to cancel or reschedule, please call [phone].  Thank you

## 2024-03-13 NOTE — PROGRESS NOTES
03/13/24 0934   Discharge Planning   Patient expects to be discharged to: Home     Per notes patient has not been medically cleared, IV lasix switching to Po lasix, per Cardiology notes patient to be fitted for life vest, once medically clear patient should be discharged home, I will continue to monitor patient progress for discharge planning.

## 2024-03-13 NOTE — CARE PLAN
The patient's goals for the shift include      The clinical goals for the shift include Pt will remain safe    Over the shift, the patient did make progress toward the following goals.   Problem: Safety  Goal: Patient will be injury free during hospitalization  Outcome: Progressing     Problem: Pain  Goal: My pain/discomfort is manageable  Outcome: Progressing     Problem: Skin  Goal: Prevent/minimize sheer/friction injuries  Outcome: Progressing

## 2024-03-13 NOTE — PROGRESS NOTES
Tom Haas is a 78 y.o. male on day 2 of admission presenting with Hypertensive urgency.      Subjective   Seen and examined.   Sitting up at the edge of the bed.   Easy respirations on room air. No CP or shortness of breath.   He is without complaints.   Chart/labs/meds/notes/imaging/VS reviewed.       Objective          Vitals 24HR  Heart Rate:  [76-96]   Temp:  [35.8 °C (96.5 °F)-37.2 °C (99 °F)]   Resp:  [17-18]   BP: (119-152)/(70-90)   SpO2:  [95 %-98 %]     Intake/Output last 3 Shifts:  No intake or output data in the 24 hours ending 03/13/24 1157      Physical Exam  GENERAL: alert, cooperative, pleasant, in no acute distress  SKIN: warm, dry  NECK: No JVP elevation.   CARDIAC: Regular rate and rhythm no murmurs  CHEST: Normal respiratory efforts, lungs clear to auscultation bilaterally.   ABDOMEN: soft, nondistended  EXTREMITIES: no lower extremity edema  NEURO: Alert and oriented x 3. Non focal.      Scheduled Medications  aspirin, 81 mg, oral, Daily  atorvastatin, 20 mg, oral, Nightly  calcitriol, 0.5 mcg, intravenous, Once per day on Mon Wed Fri  carvedilol, 6.25 mg, oral, BID  cholecalciferol, 2,000 Units, oral, Daily  doxycylcine, 100 mg, oral, q12h RASHAWN  enoxaparin, 30 mg, subcutaneous, q24h  hydrALAZINE, 10 mg, oral, BID  isosorbide mononitrate ER, 30 mg, oral, Daily  perflutren protein A microsphere, 0.5 mL, intravenous, Once in imaging  polyethylene glycol, 17 g, oral, Daily  sulfur hexafluoride microsphr, 2 mL, intravenous, Once in imaging      Continuous medications       PRN medications: hydrALAZINE     Relevant Results  Results from last 7 days   Lab Units 03/13/24  0846 03/12/24  0627 03/10/24  1400   WBC AUTO x10*3/uL 6.8 8.4 10.2   HEMOGLOBIN g/dL 10.7* 10.6* 12.7*   HEMATOCRIT % 30.5* 32.3* 39.8*   PLATELETS AUTO x10*3/uL 159 154 194   NEUTROS PCT AUTO % 71.4 73.8 81.8   LYMPHS PCT AUTO % 10.7 11.8 10.3   MONOS PCT AUTO % 12.9 9.3 6.0   EOS PCT AUTO % 4.3 4.2 1.3       Results from last 7  days   Lab Units 03/12/24  0627 03/11/24  0515 03/10/24  1400   SODIUM mmol/L 136 133* 134*   POTASSIUM mmol/L 3.7 4.3 4.5   CHLORIDE mmol/L 101 102 100   CO2 mmol/L 22 20* 19*   BUN mg/dL 70* 63* 62*   CREATININE mg/dL 4.02* 3.58* 3.35*   GLUCOSE mg/dL 94 88 124*   CALCIUM mg/dL 8.5* 7.9* 8.6         XR chest 1 view   Final Result   1.  Cardiomegaly with tiny left basilar effusion.             MACRO:   None        Signed by: Joseph Suarez 3/11/2024 4:38 PM   Dictation workstation:   [id number]      US thoracentesis   Final Result   Uneventful thoracentesis, as detailed above. Right pleural space, 900   mL        I personally performed and/or directly supervised this study and was   present for the entire procedure.        Performed and dictated at [facility].        Signed by: Balbir Edmonds 3/11/2024 4:47 PM   Dictation workstation:   [id number]      Transthoracic Echo (TTE) Complete   Final Result      CT abdomen pelvis wo IV contrast   Final Result   Asymmetric right renal atrophy with diffuse cortical thinning. Mild   right hydronephrosis isolated to the upper pole as well as mildly   dilated right extrarenal pelvis. No right ureteral dilation.        Several varying sized left renal cysts including a posterior   interpolar 2 cm mildly complex cyst with small peripheral   calcification. No left hydroureteronephrosis.        Colonic diverticulosis without acute diverticulitis.        Enlarged prostate protruding toward the base of the urinary bladder.        Indeterminate 1 x 2.1 cm ovoid partially exophytic mildly   hyperattenuating lesion along the posterolateral margin of the right   hepatic lobe. Liver MRI could be considered for further   characterization.        Normal appendix. No bowel obstruction.        Partially visualized changes in the lower thorax as more fully   described on the recent chest CT of 03/10/2024.                  MACRO:   None.        Signed by: Pérez  Sfiligoj 3/12/2024 10:13 AM   Dictation workstation:   [id number]      CT chest wo IV contrast   Final Result   Large right and small left pleural effusion and mild bibasilar   atelectatic/consolidative opacities.        6 mm left lower lobe pulmonary nodule; follow-up CT chest is   recommended in 6 months to assess stability.        MACRO:   None        Signed by: Frandy Sutton 3/10/2024 7:37 PM   Dictation workstation:   [id number]      US renal complete   Final Result   Severe cortical thinning of right kidney and right hydronephrosis. CT   may be obtained to better assess the renal anatomy.        Multiple left renal cysts.        Prostatomegaly resulting in posterior mass effect on the urinary   bladder; please clinically correlate with PSA.        MACRO:   None        Signed by: Frandy Sutton 3/10/2024 6:45 PM   Dictation workstation:   [id number]      XR chest 2 views   Final Result   1.  Right basilar airspace opacification which may reflect pneumonia   in the appropriate clinical setting             Signed by: Tre Bronson 3/10/2024 2:01 PM   Dictation workstation:   [id number]      [facility] Lifevest    (Results Pending)            Assessment/Plan      Tom Haas is a 78-year-old male with a past medical history of coronary artery disease, carotid stenosis, hypertension x 20 years, LVH, hyperlipidemia and a history of G3A chronic kidney disease having a creatinine of 1.37 back in July 2020 when last checked.  He has been lost to follow-up and has not seen a physician in approximately 2 and a half years.  He has been off medication during this duration.  He presents with shortness of breath and hypertension.    In the ED, his BP was 207/114 mmHg with a heart rate of 115.  His high-sensitivity troponin was greater than than 3000 x 2, BNP of 2432, creatinine of 3.35.  ECG showed sinus tachycardia.  There was a large right and small left pleural effusion and a 6 mm left lower lobe pulmonary nodule on  chest CT imaging.  Renal ultrasound imaging showed a right kidney measuring 8.6 cm with hydronephrosis and a left kidney of 13.4 cm.  Multiple left renal cyst and severe cortical thinning on the right.  Prostate was enlarged.  Nephrology is consulted for renal care. He denies a history of obvious cancer, connective tissue disease, anti-inflammatory use or family history of ESRD.     Mr. Haas has severe and untreated hypertension with a background of G3 CKD nearly 4 years ago.  Question if this is acute kidney injury versus progressive chronic kidney disease. Favor the latter. 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 Repeat CT shows only mild hydronephrosis. He otherwise had a right thoracentesis with 0.9L removed. Fluid analysis is ordered. 2 D ECHO shows an EF of 30% with reduced RV function. Unfortunately given his poor renal function he will not receive GDMT for heart failure as well as an ischemic evaluation at the moment. His creatinine has trended higher as we have corrected his BP. There is nearly 3 gm of proteinuria. I sent a limited serologic work-up, negative complements so far. 25 vitamin D, intact PTH and PSA were sent. I will start him on 0.25 of calcitriol 3 x weekly and 2,000 units of vitamin D. PSA is not elevated. There is no indication for renal replacement therapy. He will need to follow up outpatient [phone],         Principal Problem:    Hypertensive urgency  Active Problems:    Non-STEMI (non-ST elevated myocardial infarction) (CMS/[facility])    Acute heart failure (CMS/[facility])    Chronic renal impairment    Pneumonia due to infectious organism    CHF (congestive heart failure), NYHA class I, acute on chronic, combined (CMS/[facility])              I spent 40 minutes in the professional and overall care of this patient.      Anish Bazan, DO

## 2024-03-13 NOTE — PROGRESS NOTES
"[facility]          Admitting Provider: Jerson Triplett MD Admission Date: 3/10/2024.   Attending Provider: Jerson Triplett MD MRN: [id number]       Subjective   Tom Haas is a 78 y.o. male on day 2 of admission presenting with Hypertensive urgency.  Interval History dyspnea improved.     Objective   Physical Exam  Last Recorded Vitals: Blood pressure 134/83, pulse 83, temperature 36.5 °C (97.7 °F), resp. rate 18, height 1.651 m (5' 5\"), weight 72.6 kg (160 lb), SpO2 95 %.  Patient Vitals for the past 24 hrs:   BP Temp Temp src Pulse Resp SpO2   03/13/24 0735 134/83 36.5 °C (97.7 °F) -- 83 18 95 %   03/13/24 0442 135/75 37.2 °C (99 °F) Oral 87 18 97 %   03/13/24 0102 152/90 36.9 °C (98.4 °F) Oral 89 18 98 %   03/12/24 1940 151/90 36.9 °C (98.4 °F) Oral 96 18 97 %   03/12/24 1508 135/82 36.5 °C (97.7 °F) Oral 87 17 98 %   03/12/24 1115 122/72 36.6 °C (97.8 °F) Oral 82 17 98 %     Body mass index is 26.63 kg/m².  GENERAL: alert, cooperative, or no distress  SKIN: no rashes  NECK: supple, no thyromegaly, JVP within normal limits  LUNGS:  not in respiratory distress, respiratory rate normal, clear to auscultation  CARDIAC: regular rate and rhythm, normal S1 and S2, no murmur, rub, or gallop  ABDOMEN: Soft, non-tender, normal bowel sounds; no bruits, organomegaly or masses.  EXTREMITIES: No edema  NEURO: Alert and oriented x 3 , gait normal ., reflexes normal and symmetric, strength and  sensation grossly normal  Intake/Output last 3 Shifts:  I/O last 3 completed shifts:  In: 120 (1.7 mL/kg) [P.O.:120]  Out: - (0 mL/kg)   Weight: 72.6 kg   DATA:   Diagnostic tests reviewed for today's visit:    Most recent  labs and imaging results  Results for orders placed or performed during the hospital encounter of 03/10/24 (from the past 24 hour(s))   Protein, Urine Random   Result Value Ref Range    Total Protein, Urine Random 158 (H) 5 - 25 mg/dL    Creatinine, Urine Random 52.8 20.0 - 370.0 " "mg/dL    T. Protein/Creatinine Ratio 2.99 (H) 0.00 - 0.17 mg/mg Creat   C3 Complement   Result Value Ref Range    C3 Complement 121 87 - 200 mg/dL   C4 Complement   Result Value Ref Range    C4 Complement 33 10 - 50 mg/dL     Blood Culture   Date Value Ref Range Status   03/10/2024 No growth at 2 days  Preliminary   03/10/2024 No growth at 2 days  Preliminary    No results found for: \"RESPCULTSM\" No results found for: \"PERDIAFLDCUL\"   Sterile Fluid Culture/Smear   Date Value Ref Range Status   03/11/2024 No growth to date  Preliminary      CT abdomen pelvis wo IV contrast    Result Date: 3/12/2024  Interpreted By:  Pérez Spann, STUDY: CT ABDOMEN PELVIS WO IV CONTRAST;  3/11/2024 11:02 am   INDICATION: Signs/Symptoms:hydronephrosis.   COMPARISON: CT chest of 03/10/2024. Renal ultrasound of 03/10/2024.   ACCESSION NUMBER(S): [id number]   ORDERING CLINICIAN: PHAM ROMERO   TECHNIQUE: CT of the abdomen and pelvis was performed. No contrast was administered. Coronal and sagittal reformations were made.   FINDINGS: LOWER CHEST: There is partial visualization of a moderate-large right pleural effusion and small left pleural effusion posteriorly as well as bibasilar irregular regions of atelectasis and/or small infiltrates. Left lower lobe approximate 7 mm pulmonary nodule also again seen (image 25 of 163. Heart is enlarged. Please see prior chest CT report for additional detail.   ABDOMEN:   LIVER: There is an indeterminate ovoid partially exophytic mildly hyperattenuating lesion along the posterolateral margin of the right hepatic lobe measuring 1 x 2.1 cm in axial dimension (image 41 of 143).   BILE DUCTS: Nondilated.   GALLBLADDER: Gallbladder is contracted with nonspecific mild wall thickening. No calcified gallstones identified.   PANCREAS: No focal pancreatic lesion or peripancreatic inflammation is seen.   SPLEEN: Within normal limits.   ADRENAL GLANDS: Within normal limits.   KIDNEYS AND URETERS: " There is asymmetric right renal atrophy with diffuse cortical thinning. There is mild right hydronephrosis at the upper pole as well as fluid distention of the right extrarenal pelvis. Right ureter is not dilated.   Several varying sized left renal low-attenuation cysts are present including an upper pole 1.5 cm cyst and lower pole 2.8 cm cyst. There is also a posterior interpolar 2 cm cyst with small peripheral calcification. No left renal or ureteral calculi are seen. No left hydroureteronephrosis.   Urinary bladder is partially distended. There is heterogeneous enlargement of the prostate which protrudes toward the base of the urinary bladder.   VESSELS: Irregular atherosclerotic calcifications are present in the aorta and branch vessels. No aortic aneurysm. Unenhanced IVC is unremarkable.   BOWEL: No bowel obstruction. Appendix is normal.   Scattered colonic diverticula are present without acute diverticulitis.   PERITONEUM/RETROPERITONEUM/LYMPH NODES: No ascites or free air, no fluid collection.   No retroperitoneal fluid collection or lymphadenopathy.       ABDOMINAL WALL: Very small fat containing periumbilical hernia present without inflammation.   BONE AND SOFT TISSUE: Thoracolumbar mild levoscoliosis centered near the thoracolumbar junction may be partially exaggerated by positioning. Multilevel disc space narrowing and endplate spurring is present throughout the spine. Facet arthrosis is greatest in the lower lumbar spine. There is joint space narrowing and marginal spurring of both hips.       Asymmetric right renal atrophy with diffuse cortical thinning. Mild right hydronephrosis isolated to the upper pole as well as mildly dilated right extrarenal pelvis. No right ureteral dilation.   Several varying sized left renal cysts including a posterior interpolar 2 cm mildly complex cyst with small peripheral calcification. No left hydroureteronephrosis.   Colonic diverticulosis without acute diverticulitis.    Enlarged prostate protruding toward the base of the urinary bladder.   Indeterminate 1 x 2.1 cm ovoid partially exophytic mildly hyperattenuating lesion along the posterolateral margin of the right hepatic lobe. Liver MRI could be considered for further characterization.   Normal appendix. No bowel obstruction.   Partially visualized changes in the lower thorax as more fully described on the recent chest CT of 03/10/2024.       MACRO: None.   Signed by: Pérez Spann 3/12/2024 10:13 AM Dictation workstation:   [id number]    US thoracentesis    Result Date: 3/11/2024  Interpreted By:  Balbir Edmonds, STUDY: US THORACENTESIS3/11/31927:38 pm   INDICATION: Signs/Symptoms:Thoracentesisright sided pleural effusion   COMPARISON: CT chest 3/11/2024   ACCESSION NUMBER(S): [id number]   ORDERING CLINICIAN: PHAM ROMERO   TECHNIQUE: INTERVENTIONALIST(S): Balbir Edmonds   CONSENT: The patient/patient's POA/next of kin was informed of the nature of the proposed procedure. The purposes, alternatives, risks, and benefits were explained and discussed. All questions were answered and consent was obtained.   SEDATION: None   MEDICATION/CONTRAST: 1% lidocaine was used to anesthetize subcutaneously.   TIME OUT: A time out was performed immediately prior to procedure start with the interventional team, correctly identifying the patient name, date of birth, MRN, procedure, anatomy (including marking of site and side), patient position, procedure consent form, relevant laboratory and imaging test results, antibiotic administration, safety precautions, and procedure-specific equipment needs.   FINDINGS: The patient was placed in the sitting position.   The pleural space was examined with grey scale ultrasound, and the most accessible fluid identified and marked for thoracentesis.   The skin was prepped and draped in usual manner. Local anesthesia with lidocaine was administered and a right-sided thoracentesis was performed.  A 5  [place] One-Step Valved thoracentesis needle/catheter was then placed where marked. Approximately 900 mL of yellowish colored fluid was removed.  The needle/catheter was then withdrawn.   The patient tolerated the procedure well and there were no immediate complications. Specimen(s) sent to the laboratory for further evaluation, per the requesting team.       Uneventful thoracentesis, as detailed above. Right pleural space, 900 mL   I personally performed and/or directly supervised this study and was present for the entire procedure.   Performed and dictated at [facility].   Signed by: Alon Rodriguez 3/11/2024 4:47 PM Dictation workstation:   [id number]    XR chest 1 view    Result Date: 3/11/2024  Interpreted By:  Joseph Suarez, STUDY: XR CHEST 1 VIEW;  3/11/2024 4:32 pm   INDICATION: Signs/Symptoms:post right thoracentesis.   COMPARISON: 03/10/2024   ACCESSION NUMBER(S): [id number]   ORDERING CLINICIAN: ALON RODRIGUEZ   FINDINGS: AP portable view of the chest is obtained.  Limited exam due to portable nature. Magnified cardiac silhouette. Mild blunting of the left costophrenic angle likely due to a small effusion. The lungs are otherwise clear. No pneumothorax.       1.  Cardiomegaly with tiny left basilar effusion.     MACRO: None   Signed by: Joseph Suarez 3/11/2024 4:38 PM Dictation workstation:   [id number]    Transthoracic Echo (TTE) Complete    Result Date: 3/11/2024   [facility], [address] [place] [address]              Tel [phone] and Fax [phone] TRANSTHORACIC ECHOCARDIOGRAM REPORT  Patient Name:      JACK Maciel Physician:    [id number] Kelvin Murphy MD Study Date:        3/11/2024            Ordering Provider:    [id number] PHAM ROMERO MRN/PID:           [id number]             Fellow: Accession#:         [id number]         Nurse: Date of Birth/Age: 1945 / 78 years Sonographer:          Josephine Deutsch RDCS Gender:            M                    Additional Staff: Height:            165.00 cm            Admit Date:           3/10/2024 Weight:            72.00 kg             Admission Status:     Inpatient -                                                               Routine BSA / BMI:         1.79 m2 / 26.45      Encounter#:           [id number]                    kg/m2                                         Department Location:  [facility] Blood Pressure: 154 /97 mmHg Study Type:    TRANSTHORACIC ECHO (TTE) COMPLETE Diagnosis/ICD: Unspecified systolic (congestive) heart failure (CHF)-I50.20 Indication:    Congestive Heart Failure Patient History: Pertinent History: CHF. NSTEMI. Study Detail: The following Echo studies were performed: 2D, M-Mode, Doppler and               color flow. Technically challenging study due to body habitus.               Definity used as a contrast agent for endocardial border               definition. Total contrast used for this procedure was 3 mL via IV               push.  PHYSICIAN INTERPRETATION: Left Ventricle: The left ventricular systolic function is severely decreased, with an estimated ejection fraction of 30-35%. There is global hypokinesis of the left ventricle with minor regional variations. The left ventricular cavity size is normal. There is mild to moderate concentric left ventricular hypertrophy. Spectral Doppler shows an abnormal pattern of left ventricular diastolic filling. Left Atrium: The left atrium is severely dilated. Right Ventricle: The right ventricle is normal in size. There is reduced right ventricular systolic function. Right Atrium: The right atrium is mild to moderately dilated. Aortic Valve: The aortic valve is probably trileaflet. There is moderate aortic valve cusp calcification.  There is There is reduced systolic aortic valve leaflet excursion. There is no evidence of aortic valve regurgitation. The peak instantaneous gradient of the aortic valve is 29.8 mmHg. The mean gradient of the aortic valve is 13.0 mmHg. There is low flow, low gradient aortic stenosis present. Mitral Valve: The mitral valve is normal in structure. There is mild mitral valve regurgitation which is centrally directed. Tricuspid Valve: The tricuspid valve is structurally normal. There is mild tricuspid regurgitation. The Doppler estimated RVSP is within normal limits at 29.6 mmHg. Pulmonic Valve: The pulmonic valve is structurally normal. There is trace pulmonic valve regurgitation. Pericardium: There is no pericardial effusion noted. Aorta: The aortic root is normal. Systemic Veins: The inferior vena cava appears to be of normal size. In comparison to the previous echocardiogram(s): There are no prior studies on this patient for comparison purposes.  CONCLUSIONS:  1. Left ventricular systolic function is severely decreased with a 30-35% estimated ejection fraction.  2. Spectral Doppler shows an abnormal pattern of left ventricular diastolic filling.  3. There is reduced right ventricular systolic function.  4. The left atrium is severely dilated.  5. The right atrium is mild to moderately dilated.  6. RVSP within normal limits.  7. There is low flow, low gradient aortic stenosis present.  8. There is moderate aortic valve cusp calcification.  9. There is global hypokinesis of the left ventricle with minor regional variations. QUANTITATIVE DATA SUMMARY: 2D MEASUREMENTS:                           Normal Ranges: LAs:           4.00 cm    (2.7-4.0cm) IVSd:          1.20 cm    (0.6-1.1cm) LVPWd:         1.20 cm    (0.6-1.1cm) LVIDd:         5.00 cm    (3.9-5.9cm) LVIDs:         4.20 cm LV Mass Index: 130.4 g/m2 LV % FS        16.0 % LA VOLUME:                               Normal Ranges: LA Vol A4C:        98.5 ml     (22+/-6mL/m2) LA Vol A2C:        112.0 ml LA Vol BP:         110.4 ml LA Vol Index A4C:  55.0ml/m2 LA Vol Index A2C:  62.5 ml/m2 LA Vol Index BP:   61.6 ml/m2 LA Area A4C:       26.5 cm2 LA Area A2C:       26.9 cm2 LA Major Axis A4C: 6.1 cm LA Major Axis A2C: 5.5 cm LA Volume Index:   61.6 ml/m2 RA VOLUME BY A/L METHOD:                       Normal Ranges: RA Area A4C: 25.4 cm2 AORTA MEASUREMENTS:                      Normal Ranges: Ao Sinus, d: 2.68 cm (2.1-3.5cm) Ao STJ, d:   1.95 cm (1.7-3.4cm) Asc Ao, d:   3.05 cm (2.1-3.4cm) LV SYSTOLIC FUNCTION BY 2D PLANIMETRY (MOD):                     Normal Ranges: EF-A4C View: 39.2 % (>=55%) EF-A2C View: 34.6 % EF-Biplane:  37.3 % LV DIASTOLIC FUNCTION:                               Normal Ranges: MV Peak E:        0.61 m/s    (0.7-1.2 m/s) MV Peak A:        0.57 m/s    (0.42-0.7 m/s) E/A Ratio:        1.08        (1.0-2.2) MV A Dur:         111.00 msec PulmV Sys Ruben:    27.30 cm/s PulmV Abrams Ruben:   32.00 cm/s PulmV S/D Ruben:    0.90 PulmV A Revs Ruben: 13.40 cm/s PulmV A Revs Dur: 114.00 msec MITRAL VALVE:                 Normal Ranges: MV DT: 106 msec (150-240msec) AORTIC VALVE:                                    Normal Ranges: AoV Vmax:                2.73 m/s  (<=1.7m/s) AoV Peak P.8 mmHg (<20mmHg) AoV Mean P.0 mmHg (1.7-11.5mmHg) LVOT Max Ruben:            0.70 m/s  (<=1.1m/s) AoV VTI:                 42.60 cm  (18-25cm) LVOT VTI:                12.10 cm LVOT Diameter:           2.10 cm   (1.8-2.4cm) AoV Area, VTI:           0.98 cm2  (2.5-5.5cm2) AoV Area,Vmax:           0.89 cm2  (2.5-4.5cm2) AoV Dimensionless Index: 0.28  RIGHT VENTRICLE: RV Basal 4.27 cm RV Mid   3.11 cm RV Major 7.7 cm TAPSE:   16.6 mm TRICUSPID VALVE/RVSP:                             Normal Ranges: Peak TR Velocity: 2.58 m/s Est. RA Pressure: 3 mmHg RV Syst Pressure: 29.6 mmHg (< 30mmHg) IVC Diam:         1.86 cm PULMONIC VALVE:                         Normal  Ranges: PV Accel Time: 58 msec  (>120ms) PV Max Ruben:    1.4 m/s  (0.6-0.9m/s) PV Max P.6 mmHg Pulmonary Veins: PulmV A Revs Dur: 114.00 msec PulmV A Revs Ruben: 13.40 cm/s PulmV Abrams Ruben:   32.00 cm/s PulmV S/D Ruben:    0.90 PulmV Sys Ruben:    27.30 cm/s  [id number] Kelvin Murphy MD Electronically signed on 3/11/2024 at 3:46:31 PM  ** Final **     Current Facility-Administered Medications   Medication Dose Route Frequency Provider Last Rate Last Admin    aspirin chewable tablet 81 mg  81 mg oral Daily Jerson Triplett MD   81 mg at 24 0816    atorvastatin (Lipitor) tablet 20 mg  20 mg oral Nightly Emerita Weinberg APRN-CNP   20 mg at 24 2152    carvedilol (Coreg) tablet 6.25 mg  6.25 mg oral BID Jerson Triplett MD   6.25 mg at 24 0816    doxycycline (Vibra-Tabs) tablet 100 mg  100 mg oral q12h RASHAWN Jerson Triplett MD   100 mg at 24 0816    enoxaparin (Lovenox) syringe 30 mg  30 mg subcutaneous q24h Jerson Triplett MD   30 mg at 24 1649    hydrALAZINE (Apresoline) injection 5 mg  5 mg intravenous q4h PRN Jerson Triplett MD   5 mg at 03/10/24 1612    isosorbide mononitrate ER (Imdur) 24 hr tablet 30 mg  30 mg oral Daily Jerson Triplett MD   30 mg at 24 0816    perflutren protein A microsphere (Optison) injection 0.5 mL  0.5 mL intravenous Once in imaging Jerson Triplett MD        polyethylene glycol (Glycolax, Miralax) packet 17 g  17 g oral Daily Jerson Triplett MD   17 g at 24 0839    sulfur hexafluoride microsphr (Lumason) injection 24.28 mg  2 mL intravenous Once in imaging Jerson Triplett MD         No current outpatient medications on file.   ..     Medication and Non-Pharmacologic VTE Prophylaxis/Anticoagulants      Last Anticoag Admin            enoxaparin (Lovenox) syringe 30 mg    Given 30 mg at 24 1649    Frequency: Every 24 hours         There are additional administrations since 03/10/24 0827 that are not shown.    No  unadministered anticoagulant orders found.          Assessment/Plan   Tom Haas has  Principal Problem:    Hypertensive urgency  Active Problems:     Acute systolic heart failure (CMS/HCC)    Chronic renal impairment      CHF (congestive heart failure), NYHA class I, acute on chronic, combined (CMS/HCC)     Pleural fluid transudate     CKD V     Hydronephrosis no interventions planned     6 mm left lower lobe nodule   Improved.on Nitrates and hydralazine    On beta blocker,                Needs follow up CT in 6 months   Other Hospital problems        Abnormal findings not addressed during hospitalization, but require out patient follow up. 6 mm left lower lobe nodule  Needs follow up CT in 6 months        I spent 40 minutes coordinating discharge of Tom Haas, Including reconciling medications, reviewing the labs & formulating discharge plan. discussing with NP and nursing staff.  Jerson Triplett MD

## 2024-03-13 NOTE — CARE PLAN
The patient's goals for the shift include      The clinical goals for the shift include pt willl remain safe troughout shift

## 2024-03-13 NOTE — PROGRESS NOTES
"Tom Haas is a 78 y.o. male on day 2 of admission presenting with Hypertensive urgency.    Subjective   He is not complaining of pain.  CT scan showed significant right renal atrophy and hydronephrosis.  He had left renal cysts but no hydronephrosis. Creatinine up to 4.2 today       Objective     Physical Exam  Awake and oriented.  Normal respiratory pattern    Last Recorded Vitals  Blood pressure 151/90, pulse 96, temperature 36.9 °C (98.4 °F), temperature source Oral, resp. rate 18, height 1.651 m (5' 5\"), weight 72.6 kg (160 lb), SpO2 97 %.  Intake/Output last 3 Shifts:  I/O last 3 completed shifts:  In: 120 (1.7 mL/kg) [P.O.:120]  Out: 1000 (13.8 mL/kg) [Urine:1000 (0.4 mL/kg/hr)]  Weight: 72.6 kg     Relevant Results  Scheduled medications  aspirin, 81 mg, oral, Daily  atorvastatin, 20 mg, oral, Nightly  carvedilol, 6.25 mg, oral, BID  doxycylcine, 100 mg, oral, q12h RASHAWN  enoxaparin, 30 mg, subcutaneous, q24h  isosorbide mononitrate ER, 30 mg, oral, Daily  perflutren protein A microsphere, 0.5 mL, intravenous, Once in imaging  polyethylene glycol, 17 g, oral, Daily  sulfur hexafluoride microsphr, 2 mL, intravenous, Once in imaging      Continuous medications     PRN medications  PRN medications: hydrALAZINE    Results for orders placed or performed during the hospital encounter of 03/10/24 (from the past 24 hour(s))   Prostate Specific Antigen, Screen   Result Value Ref Range    Prostate Specific Antigen,Screen 3.90 <=4.00 ng/mL   Vitamin D 25-Hydroxy,Total (for eval of Vitamin D levels)   Result Value Ref Range    Vitamin D, 25-Hydroxy, Total 35 30 - 100 ng/mL   Parathyroid Hormone, Intact   Result Value Ref Range    Parathyroid Hormone, Intact 306.4 (H) 18.5 - 88.0 pg/mL   CBC and Auto Differential   Result Value Ref Range    WBC 8.4 4.4 - 11.3 x10*3/uL    nRBC 0.0 0.0 - 0.0 /100 WBCs    RBC 3.71 (L) 4.50 - 5.90 x10*6/uL    Hemoglobin 10.6 (L) 13.5 - 17.5 g/dL    Hematocrit 32.3 (L) 41.0 - 52.0 %    MCV " 87 80 - 100 fL    MCH 28.6 26.0 - 34.0 pg    MCHC 32.8 32.0 - 36.0 g/dL    RDW 13.9 11.5 - 14.5 %    Platelets 154 150 - 450 x10*3/uL    Neutrophils % 73.8 40.0 - 80.0 %    Immature Granulocytes %, Automated 0.4 0.0 - 0.9 %    Lymphocytes % 11.8 13.0 - 44.0 %    Monocytes % 9.3 2.0 - 10.0 %    Eosinophils % 4.2 0.0 - 6.0 %    Basophils % 0.5 0.0 - 2.0 %    Neutrophils Absolute 6.21 (H) 1.60 - 5.50 x10*3/uL    Immature Granulocytes Absolute, Automated 0.03 0.00 - 0.50 x10*3/uL    Lymphocytes Absolute 0.99 0.80 - 3.00 x10*3/uL    Monocytes Absolute 0.78 0.05 - 0.80 x10*3/uL    Eosinophils Absolute 0.35 0.00 - 0.40 x10*3/uL    Basophils Absolute 0.04 0.00 - 0.10 x10*3/uL   Renal function panel   Result Value Ref Range    Glucose 94 74 - 99 mg/dL    Sodium 136 136 - 145 mmol/L    Potassium 3.7 3.5 - 5.3 mmol/L    Chloride 101 98 - 107 mmol/L    Bicarbonate 22 21 - 32 mmol/L    Anion Gap 17 10 - 20 mmol/L    Urea Nitrogen 70 (H) 6 - 23 mg/dL    Creatinine 4.02 (H) 0.50 - 1.30 mg/dL    eGFR 14 (L) >60 mL/min/1.73m*2    Calcium 8.5 (L) 8.6 - 10.3 mg/dL    Phosphorus 4.9 2.5 - 4.9 mg/dL    Albumin 3.4 3.4 - 5.0 g/dL   Lactate Dehydrogenase   Result Value Ref Range     84 - 246 U/L   Protein, Urine Random   Result Value Ref Range    Total Protein, Urine Random 158 (H) 5 - 25 mg/dL    Creatinine, Urine Random 52.8 20.0 - 370.0 mg/dL    T. Protein/Creatinine Ratio 2.99 (H) 0.00 - 0.17 mg/mg Creat   C3 Complement   Result Value Ref Range    C3 Complement 121 87 - 200 mg/dL   C4 Complement   Result Value Ref Range    C4 Complement 33 10 - 50 mg/dL       CT abdomen pelvis wo IV contrast    Result Date: 3/12/2024  Interpreted By:  Pérez Spann, STUDY: CT ABDOMEN PELVIS WO IV CONTRAST;  3/11/2024 11:02 am   INDICATION: Signs/Symptoms:hydronephrosis.   COMPARISON: CT chest of 03/10/2024. Renal ultrasound of 03/10/2024.   ACCESSION NUMBER(S): [id number]   ORDERING CLINICIAN: PHAM ROMERO   TECHNIQUE: CT of the  abdomen and pelvis was performed. No contrast was administered. Coronal and sagittal reformations were made.   FINDINGS: LOWER CHEST: There is partial visualization of a moderate-large right pleural effusion and small left pleural effusion posteriorly as well as bibasilar irregular regions of atelectasis and/or small infiltrates. Left lower lobe approximate 7 mm pulmonary nodule also again seen (image 25 of 163. Heart is enlarged. Please see prior chest CT report for additional detail.   ABDOMEN:   LIVER: There is an indeterminate ovoid partially exophytic mildly hyperattenuating lesion along the posterolateral margin of the right hepatic lobe measuring 1 x 2.1 cm in axial dimension (image 41 of 143).   BILE DUCTS: Nondilated.   GALLBLADDER: Gallbladder is contracted with nonspecific mild wall thickening. No calcified gallstones identified.   PANCREAS: No focal pancreatic lesion or peripancreatic inflammation is seen.   SPLEEN: Within normal limits.   ADRENAL GLANDS: Within normal limits.   KIDNEYS AND URETERS: There is asymmetric right renal atrophy with diffuse cortical thinning. There is mild right hydronephrosis at the upper pole as well as fluid distention of the right extrarenal pelvis. Right ureter is not dilated.   Several varying sized left renal low-attenuation cysts are present including an upper pole 1.5 cm cyst and lower pole 2.8 cm cyst. There is also a posterior interpolar 2 cm cyst with small peripheral calcification. No left renal or ureteral calculi are seen. No left hydroureteronephrosis.   Urinary bladder is partially distended. There is heterogeneous enlargement of the prostate which protrudes toward the base of the urinary bladder.   VESSELS: Irregular atherosclerotic calcifications are present in the aorta and branch vessels. No aortic aneurysm. Unenhanced IVC is unremarkable.   BOWEL: No bowel obstruction. Appendix is normal.   Scattered colonic diverticula are present without acute  diverticulitis.   PERITONEUM/RETROPERITONEUM/LYMPH NODES: No ascites or free air, no fluid collection.   No retroperitoneal fluid collection or lymphadenopathy.       ABDOMINAL WALL: Very small fat containing periumbilical hernia present without inflammation.   BONE AND SOFT TISSUE: Thoracolumbar mild levoscoliosis centered near the thoracolumbar junction may be partially exaggerated by positioning. Multilevel disc space narrowing and endplate spurring is present throughout the spine. Facet arthrosis is greatest in the lower lumbar spine. There is joint space narrowing and marginal spurring of both hips.       Asymmetric right renal atrophy with diffuse cortical thinning. Mild right hydronephrosis isolated to the upper pole as well as mildly dilated right extrarenal pelvis. No right ureteral dilation.   Several varying sized left renal cysts including a posterior interpolar 2 cm mildly complex cyst with small peripheral calcification. No left hydroureteronephrosis.   Colonic diverticulosis without acute diverticulitis.   Enlarged prostate protruding toward the base of the urinary bladder.   Indeterminate 1 x 2.1 cm ovoid partially exophytic mildly hyperattenuating lesion along the posterolateral margin of the right hepatic lobe. Liver MRI could be considered for further characterization.   Normal appendix. No bowel obstruction.   Partially visualized changes in the lower thorax as more fully described on the recent chest CT of 03/10/2024.       MACRO: None.   Signed by: Pérez Spann 3/12/2024 10:13 AM Dictation workstation:   [id number]    ECG 12 lead    Result Date: 3/11/2024  Sinus tachycardia with Premature atrial complexes Left ventricular hypertrophy with repolarization abnormality ( R in aVL , Edward product ) Abnormal ECG When compared with ECG of 12-NOV-2012 10:59, Premature atrial complexes are now Present Vent. rate has increased BY  48 BPM QRS axis Shifted left ST elevation now present in Anterior  leads ST now depressed in Lateral leads T wave inversion now evident in Lateral leads See ED provider note for full interpretation and clinical correlation Confirmed by Vanesa Peterson ([id number]) on 3/11/2024 5:21:40 PM    US thoracentesis    Result Date: 3/11/2024  Interpreted By:  Balbir Edmonds, STUDY: US THORACENTESIS3/11/32444:38 pm   INDICATION: Signs/Symptoms:Thoracentesisright sided pleural effusion   COMPARISON: CT chest 3/11/2024   ACCESSION NUMBER(S): [id number]   ORDERING CLINICIAN: PHAM ROMERO   TECHNIQUE: INTERVENTIONALIST(S): Balbir Edmonds   CONSENT: The patient/patient's POA/next of kin was informed of the nature of the proposed procedure. The purposes, alternatives, risks, and benefits were explained and discussed. All questions were answered and consent was obtained.   SEDATION: None   MEDICATION/CONTRAST: 1% lidocaine was used to anesthetize subcutaneously.   TIME OUT: A time out was performed immediately prior to procedure start with the interventional team, correctly identifying the patient name, date of birth, MRN, procedure, anatomy (including marking of site and side), patient position, procedure consent form, relevant laboratory and imaging test results, antibiotic administration, safety precautions, and procedure-specific equipment needs.   FINDINGS: The patient was placed in the sitting position.   The pleural space was examined with grey scale ultrasound, and the most accessible fluid identified and marked for thoracentesis.   The skin was prepped and draped in usual manner. Local anesthesia with lidocaine was administered and a right-sided thoracentesis was performed.  A 5 Lao One-Step Valved thoracentesis needle/catheter was then placed where marked. Approximately 900 mL of yellowish colored fluid was removed.  The needle/catheter was then withdrawn.   The patient tolerated the procedure well and there were no immediate complications. Specimen(s) sent to the laboratory for  further evaluation, per the requesting team.       Uneventful thoracentesis, as detailed above. Right pleural space, 900 mL   I personally performed and/or directly supervised this study and was present for the entire procedure.   Performed and dictated at [facility].   Signed by: Alon Rodriguez 3/11/2024 4:47 PM Dictation workstation:   [id number]    XR chest 1 view    Result Date: 3/11/2024  Interpreted By:  Joseph Suarez, STUDY: XR CHEST 1 VIEW;  3/11/2024 4:32 pm   INDICATION: Signs/Symptoms:post right thoracentesis.   COMPARISON: 03/10/2024   ACCESSION NUMBER(S): [id number]   ORDERING CLINICIAN: ALON RODRIGUEZ   FINDINGS: AP portable view of the chest is obtained.  Limited exam due to portable nature. Magnified cardiac silhouette. Mild blunting of the left costophrenic angle likely due to a small effusion. The lungs are otherwise clear. No pneumothorax.       1.  Cardiomegaly with tiny left basilar effusion.     MACRO: None   Signed by: Joseph Suarez 3/11/2024 4:38 PM Dictation workstation:   [id number]    Transthoracic Echo (TTE) Complete    Result Date: 3/11/2024   [facility], [address] [place] [address]              Tel [phone] and Fax [phone] TRANSTHORACIC ECHOCARDIOGRAM REPORT  Patient Name:      JACK Maciel Physician:    [id number] Kelvin Murphy MD Study Date:        3/11/2024            Ordering Provider:    [id number] PHAM ROMERO MRN/PID:           [id number]             Fellow: Accession#:        [id number]         Nurse: Date of Birth/Age: 1945 / 78 years Sonographer:          Josephine Deutsch RDCS Gender:            M                    Additional Staff: Height:            165.00 cm            Admit Date:           3/10/2024 Weight:             72.00 kg             Admission Status:     Inpatient -                                                               Routine BSA / BMI:         1.79 m2 / 26.45      Encounter#:           [id number]                    kg/m2                                         Department Location:  [facility] Blood Pressure: 154 /97 mmHg Study Type:    TRANSTHORACIC ECHO (TTE) COMPLETE Diagnosis/ICD: Unspecified systolic (congestive) heart failure (CHF)-I50.20 Indication:    Congestive Heart Failure Patient History: Pertinent History: CHF. NSTEMI. Study Detail: The following Echo studies were performed: 2D, M-Mode, Doppler and               color flow. Technically challenging study due to body habitus.               Definity used as a contrast agent for endocardial border               definition. Total contrast used for this procedure was 3 mL via IV               push.  PHYSICIAN INTERPRETATION: Left Ventricle: The left ventricular systolic function is severely decreased, with an estimated ejection fraction of 30-35%. There is global hypokinesis of the left ventricle with minor regional variations. The left ventricular cavity size is normal. There is mild to moderate concentric left ventricular hypertrophy. Spectral Doppler shows an abnormal pattern of left ventricular diastolic filling. Left Atrium: The left atrium is severely dilated. Right Ventricle: The right ventricle is normal in size. There is reduced right ventricular systolic function. Right Atrium: The right atrium is mild to moderately dilated. Aortic Valve: The aortic valve is probably trileaflet. There is moderate aortic valve cusp calcification. There is There is reduced systolic aortic valve leaflet excursion. There is no evidence of aortic valve regurgitation. The peak instantaneous gradient of the aortic valve is 29.8 mmHg. The mean gradient of the aortic valve is 13.0 mmHg. There is low flow, low gradient aortic stenosis present. Mitral Valve: The  mitral valve is normal in structure. There is mild mitral valve regurgitation which is centrally directed. Tricuspid Valve: The tricuspid valve is structurally normal. There is mild tricuspid regurgitation. The Doppler estimated RVSP is within normal limits at 29.6 mmHg. Pulmonic Valve: The pulmonic valve is structurally normal. There is trace pulmonic valve regurgitation. Pericardium: There is no pericardial effusion noted. Aorta: The aortic root is normal. Systemic Veins: The inferior vena cava appears to be of normal size. In comparison to the previous echocardiogram(s): There are no prior studies on this patient for comparison purposes.  CONCLUSIONS:  1. Left ventricular systolic function is severely decreased with a 30-35% estimated ejection fraction.  2. Spectral Doppler shows an abnormal pattern of left ventricular diastolic filling.  3. There is reduced right ventricular systolic function.  4. The left atrium is severely dilated.  5. The right atrium is mild to moderately dilated.  6. RVSP within normal limits.  7. There is low flow, low gradient aortic stenosis present.  8. There is moderate aortic valve cusp calcification.  9. There is global hypokinesis of the left ventricle with minor regional variations. QUANTITATIVE DATA SUMMARY: 2D MEASUREMENTS:                           Normal Ranges: LAs:           4.00 cm    (2.7-4.0cm) IVSd:          1.20 cm    (0.6-1.1cm) LVPWd:         1.20 cm    (0.6-1.1cm) LVIDd:         5.00 cm    (3.9-5.9cm) LVIDs:         4.20 cm LV Mass Index: 130.4 g/m2 LV % FS        16.0 % LA VOLUME:                               Normal Ranges: LA Vol A4C:        98.5 ml    (22+/-6mL/m2) LA Vol A2C:        112.0 ml LA Vol BP:         110.4 ml LA Vol Index A4C:  55.0ml/m2 LA Vol Index A2C:  62.5 ml/m2 LA Vol Index BP:   61.6 ml/m2 LA Area A4C:       26.5 cm2 LA Area A2C:       26.9 cm2 LA Major Axis A4C: 6.1 cm LA Major Axis A2C: 5.5 cm LA Volume Index:   61.6 ml/m2 RA VOLUME BY A/L  METHOD:                       Normal Ranges: RA Area A4C: 25.4 cm2 AORTA MEASUREMENTS:                      Normal Ranges: Ao Sinus, d: 2.68 cm (2.1-3.5cm) Ao STJ, d:   1.95 cm (1.7-3.4cm) Asc Ao, d:   3.05 cm (2.1-3.4cm) LV SYSTOLIC FUNCTION BY 2D PLANIMETRY (MOD):                     Normal Ranges: EF-A4C View: 39.2 % (>=55%) EF-A2C View: 34.6 % EF-Biplane:  37.3 % LV DIASTOLIC FUNCTION:                               Normal Ranges: MV Peak E:        0.61 m/s    (0.7-1.2 m/s) MV Peak A:        0.57 m/s    (0.42-0.7 m/s) E/A Ratio:        1.08        (1.0-2.2) MV A Dur:         111.00 msec PulmV Sys Ruben:    27.30 cm/s PulmV Abrams Ruben:   32.00 cm/s PulmV S/D Ruben:    0.90 PulmV A Revs Ruben: 13.40 cm/s PulmV A Revs Dur: 114.00 msec MITRAL VALVE:                 Normal Ranges: MV DT: 106 msec (150-240msec) AORTIC VALVE:                                    Normal Ranges: AoV Vmax:                2.73 m/s  (<=1.7m/s) AoV Peak P.8 mmHg (<20mmHg) AoV Mean P.0 mmHg (1.7-11.5mmHg) LVOT Max Ruben:            0.70 m/s  (<=1.1m/s) AoV VTI:                 42.60 cm  (18-25cm) LVOT VTI:                12.10 cm LVOT Diameter:           2.10 cm   (1.8-2.4cm) AoV Area, VTI:           0.98 cm2  (2.5-5.5cm2) AoV Area,Vmax:           0.89 cm2  (2.5-4.5cm2) AoV Dimensionless Index: 0.28  RIGHT VENTRICLE: RV Basal 4.27 cm RV Mid   3.11 cm RV Major 7.7 cm TAPSE:   16.6 mm TRICUSPID VALVE/RVSP:                             Normal Ranges: Peak TR Velocity: 2.58 m/s Est. RA Pressure: 3 mmHg RV Syst Pressure: 29.6 mmHg (< 30mmHg) IVC Diam:         1.86 cm PULMONIC VALVE:                         Normal Ranges: PV Accel Time: 58 msec  (>120ms) PV Max Ruben:    1.4 m/s  (0.6-0.9m/s) PV Max P.6 mmHg Pulmonary Veins: PulmV A Revs Dur: 114.00 msec PulmV A Revs Ruben: 13.40 cm/s PulmV Abrams Ruben:   32.00 cm/s PulmV S/D Ruben:    0.90 PulmV Sys Ruben:    27.30 cm/s  [id number] Kelvin Murphy MD Electronically signed on 3/11/2024 at  3:46:31 PM  ** Final **     CT chest wo IV contrast    Result Date: 3/10/2024  Interpreted By:  Frandy Sutton, STUDY: CT CHEST WO IV CONTRAST;  3/10/2024 7:15 pm   INDICATION: Signs/Symptoms:pleural effusion.   COMPARISON: None.   ACCESSION NUMBER(S): [id number]   ORDERING CLINICIAN: PHAM ROMERO   TECHNIQUE: Contiguous axial images of the chest were obtained without intravenous contrast. Coronal and sagittal reformatted images were obtained from the axial images.   FINDINGS: The examination is limited secondary of intravenous contrast.   No axillary lymphadenopathy. The AP window lymph nodes measure up to 10 mm. Paratracheal lymph nodes measure up to 15 mm. 14 mm and 19 mm subcarinal lymph nodes. There is limited evaluation for hilar lymphadenopathy on noncontrast examination.   There is cardiomegaly. Coronary artery calcifications. No significant pericardial effusion.   There is large right and small left pleural effusion and mild bibasilar atelectatic/consolidative opacities. 6 mm left lower lobe pulmonary nodule. No pneumothorax.   Limited evaluation of the upper abdomen. Severe cortical thinning and hydronephrosis partially imaged right kidney. Partially imaged left renal cysts.   Multilevel degenerative change of the thoracic spine.       Large right and small left pleural effusion and mild bibasilar atelectatic/consolidative opacities.   6 mm left lower lobe pulmonary nodule; follow-up CT chest is recommended in 6 months to assess stability.   MACRO: None   Signed by: Frandy Sutton 3/10/2024 7:37 PM Dictation workstation:   [id number]    US renal complete    Result Date: 3/10/2024  Interpreted By:  Frandy Sutton, STUDY: US RENAL COMPLETE;  3/10/2024 5:52 pm   INDICATION: Signs/Symptoms:ckd.   COMPARISON: None.   ACCESSION NUMBER(S): [id number]   ORDERING CLINICIAN: PHAM ROMERO   TECHNIQUE: Multiple sonographic grayscale and color images of the kidneys were performed.   FINDINGS: The  examination is limited secondary to patient body habitus.   The right kidney measures 8.6 cm in length. There is severe cortical thinning of the right kidney and right hydronephrosis.   The left kidney measures 13.4 cm in length. There are multiple left renal cysts with the largest cyst measuring 2.9 cm. A 2 cm cyst in the left kidney demonstrates thin internal septation. There is no evidence of significant left hydronephrosis.   The urinary bladder is underdistended and not well evaluated. There is prostatomegaly resulting in posterior mass effect on the urinary bladder.       Severe cortical thinning of right kidney and right hydronephrosis. CT may be obtained to better assess the renal anatomy.   Multiple left renal cysts.   Prostatomegaly resulting in posterior mass effect on the urinary bladder; please clinically correlate with PSA.   MACRO: None   Signed by: Frandy Sutton 3/10/2024 6:45 PM Dictation workstation:   [id number]    XR chest 2 views    Result Date: 3/10/2024  Interpreted By:  Tre Bronson, STUDY: XR CHEST 2 VIEWS;  3/10/2024 1:46 pm   INDICATION: Signs/Symptoms:SOB.   COMPARISON: None.   ACCESSION NUMBER(S): [id number]   ORDERING CLINICIAN: DWAIN SIBLEY   FINDINGS:     CARDIOMEDIASTINAL SILHOUETTE: Cardiomediastinal silhouette is unchanged.   LUNGS: Right basilar airspace opacification. No pneumothorax. Small right-sided effusion.   ABDOMEN: No remarkable upper abdominal findings.   BONES: No acute osseous changes.   Remaining findings appear stable since prior comparison radiograph.       1.  Right basilar airspace opacification which may reflect pneumonia in the appropriate clinical setting     Signed by: Tre Bronson 3/10/2024 2:01 PM Dictation workstation:   [id number]                             Assessment/Plan   Principal Problem:    Hypertensive urgency  Active Problems:    Non-STEMI (non-ST elevated myocardial infarction) (CMS/HCC)    Acute heart failure (CMS/HCC)    Chronic renal  impairment    Pneumonia due to infectious organism    CHF (congestive heart failure), NYHA class I, acute on chronic, combined (CMS/HCC)    Hydronephrosis: The hydronephrosis on the right side is associated with significant renal atrophy.  There is no hydronephrosis on the left.  There is no evidence of obstruction.  Therefore, there is no need for any urologic intervention.  I be believe that his rising creatinine is due to intrinsic renal disease and not any obstructive element.             Terell Castanon MD

## 2024-03-14 DIAGNOSIS — I50.41 ACUTE COMBINED SYSTOLIC AND DIASTOLIC HEART FAILURE (MULTI): ICD-10-CM

## 2024-03-14 LAB
ALBUMIN: 3.4 G/DL (ref 3.4–5)
ALPHA 1 GLOBULIN: 0.3 G/DL (ref 0.2–0.6)
ALPHA 2 GLOBULIN: 0.7 G/DL (ref 0.4–1.1)
BACTERIA FLD CULT: NORMAL
BETA GLOBULIN: 1 G/DL (ref 0.5–1.2)
CREAT UR-MCNC: 22 MG/DL
GAMMA GLOBULIN: 0.7 G/DL (ref 0.5–1.4)
GRAM STN SPEC: NORMAL
GRAM STN SPEC: NORMAL
IMMUNOFIXATION COMMENT: NORMAL
PATH REVIEW - SERUM IMMUNOFIXATION: NORMAL
PATH REVIEW-SERUM PROTEIN ELECTROPHORESIS: NORMAL
PROTEIN ELECTROPHORESIS COMMENT: NORMAL
VMA UR-MCNC: 1.2 MG/L
VMA/CREAT UR: 5.5 MG/G CREAT (ref 0–6)

## 2024-03-15 LAB
BACTERIA BLD CULT: NORMAL
BACTERIA BLD CULT: NORMAL
PLA2R IGG SERPL QL IF: NORMAL

## 2024-03-16 LAB
ANCA AB PATTERN SER IF-IMP: NORMAL
ANCA IGG TITR SER IF: NORMAL {TITER}
MYELOPEROXIDASE AB SER-ACNC: 0 AU/ML (ref 0–19)
PROTEINASE3 AB SER-ACNC: 0 AU/ML (ref 0–19)

## 2024-03-18 LAB
DOPAMINE SERPL-MCNC: 43 PG/ML (ref 0–48)
EPINEPH PLAS-MCNC: 196 PG/ML (ref 0–62)
NOREPINEPH PLAS-MCNC: 862 PG/ML (ref 0–874)

## 2024-03-20 ENCOUNTER — LAB (OUTPATIENT)
Dept: LAB | Facility: LAB | Age: 79
End: 2024-03-20
Payer: MEDICARE

## 2024-03-20 ENCOUNTER — OFFICE VISIT (OUTPATIENT)
Dept: CARDIOLOGY | Facility: CLINIC | Age: 79
End: 2024-03-20
Payer: MEDICARE

## 2024-03-20 VITALS
HEART RATE: 79 BPM | TEMPERATURE: 98.3 F | OXYGEN SATURATION: 99 % | HEIGHT: 65 IN | DIASTOLIC BLOOD PRESSURE: 95 MMHG | BODY MASS INDEX: 27.49 KG/M2 | WEIGHT: 165 LBS | SYSTOLIC BLOOD PRESSURE: 158 MMHG

## 2024-03-20 DIAGNOSIS — I50.22 CHRONIC SYSTOLIC HEART FAILURE (MULTI): ICD-10-CM

## 2024-03-20 DIAGNOSIS — I50.22 CHRONIC SYSTOLIC HEART FAILURE (MULTI): Primary | ICD-10-CM

## 2024-03-20 LAB
ANION GAP SERPL CALC-SCNC: 18 MMOL/L (ref 10–20)
BNP SERPL-MCNC: 2013 PG/ML (ref 0–99)
BUN SERPL-MCNC: 73 MG/DL (ref 6–23)
CALCIUM SERPL-MCNC: 8.6 MG/DL (ref 8.6–10.3)
CHLORIDE SERPL-SCNC: 103 MMOL/L (ref 98–107)
CO2 SERPL-SCNC: 19 MMOL/L (ref 21–32)
CREAT SERPL-MCNC: 3.57 MG/DL (ref 0.5–1.3)
EGFRCR SERPLBLD CKD-EPI 2021: 17 ML/MIN/1.73M*2
GLUCOSE SERPL-MCNC: 85 MG/DL (ref 74–99)
POTASSIUM SERPL-SCNC: 5.4 MMOL/L (ref 3.5–5.3)
SODIUM SERPL-SCNC: 135 MMOL/L (ref 136–145)

## 2024-03-20 PROCEDURE — 83880 ASSAY OF NATRIURETIC PEPTIDE: CPT

## 2024-03-20 PROCEDURE — 1160F RVW MEDS BY RX/DR IN RCRD: CPT | Performed by: NURSE PRACTITIONER

## 2024-03-20 PROCEDURE — 3080F DIAST BP >= 90 MM HG: CPT | Performed by: NURSE PRACTITIONER

## 2024-03-20 PROCEDURE — 80048 BASIC METABOLIC PNL TOTAL CA: CPT

## 2024-03-20 PROCEDURE — 99214 OFFICE O/P EST MOD 30 MIN: CPT | Mod: ZK | Performed by: NURSE PRACTITIONER

## 2024-03-20 PROCEDURE — 36415 COLL VENOUS BLD VENIPUNCTURE: CPT

## 2024-03-20 PROCEDURE — 99214 OFFICE O/P EST MOD 30 MIN: CPT | Performed by: NURSE PRACTITIONER

## 2024-03-20 PROCEDURE — 1111F DSCHRG MED/CURRENT MED MERGE: CPT | Performed by: NURSE PRACTITIONER

## 2024-03-20 PROCEDURE — 1036F TOBACCO NON-USER: CPT | Performed by: NURSE PRACTITIONER

## 2024-03-20 PROCEDURE — 1159F MED LIST DOCD IN RCRD: CPT | Performed by: NURSE PRACTITIONER

## 2024-03-20 PROCEDURE — 1126F AMNT PAIN NOTED NONE PRSNT: CPT | Performed by: NURSE PRACTITIONER

## 2024-03-20 PROCEDURE — 3077F SYST BP >= 140 MM HG: CPT | Performed by: NURSE PRACTITIONER

## 2024-03-20 RX ORDER — FUROSEMIDE 40 MG/1
40 TABLET ORAL DAILY
Qty: 30 TABLET | Refills: 1 | Status: SHIPPED | OUTPATIENT
Start: 2024-03-20 | End: 2024-04-01 | Stop reason: DRUGHIGH

## 2024-03-20 ASSESSMENT — PAIN SCALES - GENERAL: PAINLEVEL: 0-NO PAIN

## 2024-03-20 NOTE — PROGRESS NOTES
Primary Care Physician: No primary care provider on file.  Date of Visit: 03/20/2024 12:30 PM EDT  [facility]     Chief Complaint:   Chief Complaint   Patient presents with    Follow-up        HPI / Summary:   Tom Haas is a 78 y.o. male with past medical history HFrEF ( LVEF 30-35% on echo 3/2024) coronary artery disease reportedly LHC in 2005 at [facility] revealed   LAD 50-60% ,  nondominant LCX 80-90% and dominant RCA 40% stenosis significantly elevated LVEDP, Carotid artery stenosis, Hypertension  with LVH, Hyperlipidemia, Hydronephrosis and Chronic kidney disease  who presents to heart failure clinic for hospital follow up.     He was admitted to [facility] from 3/10/2024 - 3/13/2024 with acute on chronic systolic heart failure after not following with a provider for a few years (he was only taking aspirin at home prior to his admission) .  He was both IV diuresed and s/p thoracentesis.  He was also found to have significant renal dysfunction.  Coronary angiography was deferred at time of admission due to significant renal dysfunction.      Since hospital discharge,  Mr. Haas states never chest pain.  Denies any shortness of breath.  Walks up and down steps to get to his bedroom.  NO swelling in arms or legs.  No dizziness, no syncope.  No nausea, vomiting.  NO blood in urine or stool.     Lives at home.  NO issues getting his medications, taking all medications as prescribed.  Completed course of antibiotics.  He was not discharged on diuretic,    Home BP running 140-150/77.  Daily weights have stable at 161 lbs.  He is here today with his wife and son.      Last Cardiology Tests:  ECG:    Echo:  TTE 3/10/2024  CONCLUSIONS:   1. Left ventricular systolic function is severely decreased with a 30-35% estimated ejection fraction.   2. Spectral Doppler shows an abnormal pattern of left ventricular diastolic filling.   3. There is reduced right ventricular systolic function.   4. The  left atrium is severely dilated.   5. The right atrium is mild to moderately dilated.   6. RVSP within normal limits.   7. There is low flow, low gradient aortic stenosis present.   8. There is moderate aortic valve cusp calcification.   9. There is global hypokinesis of the left ventricle with minor regional variations.    Cath:  reportedly [facility] in 2005 at [facility] revealed   LAD 50-60% ,  nondominant LCX 80-90% and dominant RCA 40% stenosis significantly elevated LVEDP    Stress Test:      Cardiac Imaging:    Past Medical History:  Past Medical History:   Diagnosis Date    Hypertension     Occlusion and stenosis of unspecified carotid artery     Carotid atherosclerosis    Other conditions influencing health status     Coronary Artery Disease    Personal history of other diseases of the circulatory system     History of congestive heart disease    Personal history of other diseases of the circulatory system     History of hypertension    Personal history of other endocrine, nutritional and metabolic disease     History of hyperlipidemia        Past Surgical History:  No past surgical history on file.       Social History:  He reports that he has never smoked. He has never used smokeless tobacco. No history on file for alcohol use and drug use.    Family History:  family history is not on file.      Allergies:  No Known Allergies    Outpatient Medications:  Current Outpatient Medications   Medication Instructions    aspirin 81 mg, oral, Daily    atorvastatin (LIPITOR) 20 mg, oral, Nightly    carvedilol (COREG) 6.25 mg, oral, 2 times daily    cholecalciferol (VITAMIN D-3) 2,000 Units, oral, Daily    doxycycline (VIBRAMYCIN) 100 mg, oral, Every 12 hours scheduled, Take with a full glass of water and do not lie down for at least 30 minutes after.    furosemide (LASIX) 40 mg, oral, Daily    hydrALAZINE (APRESOLINE) 10 mg, oral, 2 times daily    isosorbide mononitrate ER (IMDUR) 30 mg, oral, Daily, Do not crush or chew.  "      Physical Exam:    General:  Patient is awake, alert, and oriented.  Patient is in no acute distress.  HEENT:  Normocephalic.  Moist mucosa.    Neck:  + JVP  Cardiovascular:  Regular rate and rhythm.  Normal S1 and S2, no murmurs, rubs or gallops  Pulmonary:  Clear to auscultation bilaterally.  Abdomen:  Soft. Non-tender.   Non-distended.  Positive bowel sounds.  Lower Extremities:  2+ pedal pulses. Trace lower extremity edema  Neurologic:  Alert and oriented x3. No focal deficit.   Skin: Skin warm and dry, normal skin turgor.   Psychiatric: Normal affect.    Vitals:    03/20/24 1220   BP: (!) 158/95   Pulse: 79   Temp: 36.8 °C (98.3 °F)   SpO2: 99%   Weight: 74.8 kg (165 lb)   Height: 1.651 m (5' 5\")     Wt Readings from Last 5 Encounters:   03/20/24 74.8 kg (165 lb)   03/10/24 72.6 kg (160 lb)     Body mass index is 27.46 kg/m².        Last Labs:  CMP:  Recent Labs     03/12/24  0627 03/11/24  0515 03/10/24  1400    133* 134*   K 3.7 4.3 4.5    102 100   CO2 22 20* 19*   ANIONGAP 17 15 20   BUN 70* 63* 62*   CREATININE 4.02* 3.58* 3.35*   EGFR 14* 17* 18*   GLUCOSE 94 88 124*     Recent Labs     03/12/24  0627 03/10/24  1400   ALBUMIN 3.4 4.3   ALKPHOS  --  41   ALT  --  18   AST  --  23   BILITOT  --  0.4     CBC:  Recent Labs     03/13/24  0846 03/12/24  0627 03/10/24  1400   WBC 6.8 8.4 10.2   HGB 10.7* 10.6* 12.7*   HCT 30.5* 32.3* 39.8*    154 194   MCV 83 87 89     COAG:   Recent Labs     03/11/24  1508   INR 1.2*     ENDO:No results for input(s): \"TSH\", \"HGBA1C\" in the last 14816 hours.   CARDIAC:   Recent Labs     03/12/24  0627 03/11/24  1146 03/11/24  1039 03/11/24  0515 03/10/24  1450 03/10/24  1400     --   --   --   --   --    TROPHS  --  2,324* 2,653* 4,619*   < > 3,071*   BNP  --   --   --   --   --  2,432*    < > = values in this interval not displayed.     Recent Labs     03/11/24  0515   CHOL 173   LDLCALC 113*   HDL 38.1   TRIG 108     No data " recorded      Assessment/Plan     Tom Haas is a 78 y.o. male with past medical history HFrEF ( LVEF 30-35% on echo 3/2024) coronary artery disease reportedly LHC in 2005 at [facility] revealed   LAD 50-60% ,  nondominant LCX 80-90% and dominant RCA 40% stenosis significantly elevated LVEDP, Carotid artery stenosis, Hypertension  with LVH, Hyperlipidemia, Hydronephrosis and Chronic kidney disease  who presents to heart failure clinic for hospital follow up.     [place] [facility] [place] 3[place]13[place]24 with acute on chronic systolic heart failure after not following with a provider for a few years (he was only taking aspirin at home prior to his admission) .  He was both IV diuresed and s/p thoracentesis.  He was also found to have significant renal dysfunction.  Coronary angiography was deferred at time of admission due to significant renal dysfunction.      Doing well since discharge.  BP is up to 150 systolic, but HR well controlled.  Volume up on exam with +JVP.  Breathing is comfortable.     Plan:   - Continue to wear LifeVest as ordered  - Continue GDMT with Coreg 6.25 mg BID, Hydralazine 10 mg BID and Imdur 30 mg daily.    - No plans for SGLT2-I/Aldactone/ACE/ARB/ARNI due to his significant renal dysfunction.  - Continue ASA, statin  - Start furosemide 40mg oral daily (does not appear to have been started upon discharge, as recommended by cardiology notes) >>  addendum:  called patient back on 3/21/2024 and asked patient to increase furosemide dose to 80mg oral daily.    - BNP / BMP today    Follow up with Dr. Murphy on 5/13/2024 as previously scheduled  He will need to follow with renal as outpatient.  Given Dr. Bazan information.  Follow up with PCP tomorrow     Followup Appts:  Future Appointments   Date Time Provider Department Center   3/21/2024 11:15 AM Mansoor Hinkle MD [id number] [facility]   5/13/2024  3:00 PM Kelvin Murphy MD [id number] [facility]       _______________________________________________________  Lesvia Maravilla CNP  [facility]  [facility] - [facility]

## 2024-03-20 NOTE — PATIENT INSTRUCTIONS
Start furosemide 40mg oral DAILY    Make appointment with renal, Dr. Bazan.  Info provided.    Labs today    Follow up with PCP tomorrow

## 2024-03-21 ENCOUNTER — OFFICE VISIT (OUTPATIENT)
Dept: PRIMARY CARE | Facility: CLINIC | Age: 79
End: 2024-03-21
Payer: MEDICARE

## 2024-03-21 VITALS
RESPIRATION RATE: 16 BRPM | HEART RATE: 77 BPM | DIASTOLIC BLOOD PRESSURE: 76 MMHG | OXYGEN SATURATION: 98 % | SYSTOLIC BLOOD PRESSURE: 128 MMHG | BODY MASS INDEX: 27.49 KG/M2 | TEMPERATURE: 97.6 F | WEIGHT: 165 LBS | HEIGHT: 65 IN

## 2024-03-21 DIAGNOSIS — N18.4 CHRONIC KIDNEY DISEASE (CKD), STAGE IV (SEVERE) (MULTI): ICD-10-CM

## 2024-03-21 DIAGNOSIS — E78.00 PURE HYPERCHOLESTEROLEMIA: ICD-10-CM

## 2024-03-21 DIAGNOSIS — I50.43 CHF (CONGESTIVE HEART FAILURE), NYHA CLASS I, ACUTE ON CHRONIC, COMBINED (MULTI): Primary | ICD-10-CM

## 2024-03-21 DIAGNOSIS — E66.3 OVERWEIGHT WITH BODY MASS INDEX (BMI) OF 27 TO 27.9 IN ADULT: ICD-10-CM

## 2024-03-21 DIAGNOSIS — I15.1 HYPERTENSION SECONDARY TO OTHER RENAL DISORDERS: ICD-10-CM

## 2024-03-21 DIAGNOSIS — Z78.9 NEVER SMOKED CIGARETTES: ICD-10-CM

## 2024-03-21 PROBLEM — N18.9 CHRONIC RENAL IMPAIRMENT: Status: RESOLVED | Noted: 2024-03-10 | Resolved: 2024-03-21

## 2024-03-21 PROCEDURE — 1123F ACP DISCUSS/DSCN MKR DOCD: CPT | Performed by: INTERNAL MEDICINE

## 2024-03-21 PROCEDURE — 1159F MED LIST DOCD IN RCRD: CPT | Performed by: INTERNAL MEDICINE

## 2024-03-21 PROCEDURE — 1160F RVW MEDS BY RX/DR IN RCRD: CPT | Performed by: INTERNAL MEDICINE

## 2024-03-21 PROCEDURE — 99205 OFFICE O/P NEW HI 60 MIN: CPT | Performed by: INTERNAL MEDICINE

## 2024-03-21 PROCEDURE — 3078F DIAST BP <80 MM HG: CPT | Performed by: INTERNAL MEDICINE

## 2024-03-21 PROCEDURE — 1158F ADVNC CARE PLAN TLK DOCD: CPT | Performed by: INTERNAL MEDICINE

## 2024-03-21 PROCEDURE — 3074F SYST BP LT 130 MM HG: CPT | Performed by: INTERNAL MEDICINE

## 2024-03-21 PROCEDURE — 1036F TOBACCO NON-USER: CPT | Performed by: INTERNAL MEDICINE

## 2024-03-21 PROCEDURE — 1111F DSCHRG MED/CURRENT MED MERGE: CPT | Performed by: INTERNAL MEDICINE

## 2024-03-21 ASSESSMENT — ENCOUNTER SYMPTOMS
WOUND: 0
ACTIVITY CHANGE: 0
NERVOUS/ANXIOUS: 0
FREQUENCY: 0
ARTHRALGIAS: 0
SINUS PRESSURE: 0
SLEEP DISTURBANCE: 0
JOINT SWELLING: 0
DIFFICULTY URINATING: 0
HALLUCINATIONS: 0
COUGH: 0
CONSTIPATION: 0
COLOR CHANGE: 0
EYE REDNESS: 0
DIZZINESS: 0
HEADACHES: 0
SPEECH DIFFICULTY: 0
SHORTNESS OF BREATH: 0
RHINORRHEA: 0
NUMBNESS: 0
FEVER: 0
POLYDIPSIA: 0
CHOKING: 0
EYE DISCHARGE: 0
BRUISES/BLEEDS EASILY: 0
HEMATURIA: 0
SINUS PAIN: 0
NECK STIFFNESS: 0
ABDOMINAL PAIN: 0
POLYPHAGIA: 0
VOICE CHANGE: 0
NAUSEA: 0
STRIDOR: 0
BLOOD IN STOOL: 0
ABDOMINAL DISTENTION: 0
APPETITE CHANGE: 0
WEAKNESS: 0
PHOTOPHOBIA: 0
VOMITING: 0
BACK PAIN: 0
NECK PAIN: 0
DIARRHEA: 0
SEIZURES: 0
CONFUSION: 0
WHEEZING: 0
PALPITATIONS: 0
DYSPHORIC MOOD: 0
DYSURIA: 0
FATIGUE: 0
FLANK PAIN: 0
TREMORS: 0
CHEST TIGHTNESS: 0
TROUBLE SWALLOWING: 0
MYALGIAS: 0
FACIAL ASYMMETRY: 0

## 2024-03-21 NOTE — ASSESSMENT & PLAN NOTE
Dietary modification as discussed for weight loss.  Activity as per suggestion of Cardiologist for now.

## 2024-03-21 NOTE — ASSESSMENT & PLAN NOTE
Patient is going to see Nephrology.  Reviewed his baseline and compared renal function. He is keeping up with hydration and no urinary symptoms.

## 2024-03-21 NOTE — PROGRESS NOTES
Subjective   Patient ID: Tom Haas is a 78 y.o. male who presents for New Patient Visit.    HPI   Patient presented to the office for the first time. He has not seen PCP in very long time. He was admitted to the hospital on 3/10/2024 for exertional shortness of breath and he was found to have Acute CHF along with Acute renal failure. His EF in the hospital was 30- 35% and GFR was 18. Patient baseline GFR tested 3 years ago was more than 60 but no other labs as he never saw any provider.    Patient was fitted with life vest and he is following up with Cardiology and is going to see Nephrology. He had his renal function checked yesterday and his renal functions are still same. He is compliant with all the medications.  His exertional shortness of breath is much better and he denied for any symptoms today.    Review of Systems   Constitutional:  Negative for activity change, appetite change, fatigue and fever.   HENT:  Negative for congestion, dental problem, ear pain, hearing loss, rhinorrhea, sinus pressure, sinus pain, trouble swallowing and voice change.    Eyes:  Negative for photophobia, discharge, redness and visual disturbance.   Respiratory:  Negative for cough, choking, chest tightness, shortness of breath, wheezing and stridor.    Cardiovascular:  Negative for chest pain, palpitations and leg swelling.   Gastrointestinal:  Negative for abdominal distention, abdominal pain, blood in stool, constipation, diarrhea, nausea and vomiting.   Endocrine: Negative for polydipsia, polyphagia and polyuria.   Genitourinary:  Negative for difficulty urinating, dysuria, flank pain, frequency, hematuria and urgency.   Musculoskeletal:  Negative for arthralgias, back pain, gait problem, joint swelling, myalgias, neck pain and neck stiffness.   Skin:  Negative for color change, rash and wound.   Allergic/Immunologic: Negative for immunocompromised state.   Neurological:  Negative for dizziness, tremors, seizures, syncope,  "facial asymmetry, speech difficulty, weakness, numbness and headaches.   Hematological:  Does not bruise/bleed easily.   Psychiatric/Behavioral:  Negative for behavioral problems, confusion, dysphoric mood, hallucinations, self-injury, sleep disturbance and suicidal ideas. The patient is not nervous/anxious.      Objective   /76 (BP Location: Left arm, Patient Position: Sitting, BP Cuff Size: Adult)   Pulse 77   Temp 36.4 °C (97.6 °F) (Temporal)   Resp 16   Ht 1.651 m (5' 5\")   Wt 74.8 kg (165 lb)   SpO2 98%   BMI 27.46 kg/m²     Physical Exam  Constitutional:       General: He is not in acute distress.     Appearance: Normal appearance. He is not ill-appearing or toxic-appearing.   HENT:      Head: Normocephalic and atraumatic.      Nose: Nose normal. No congestion or rhinorrhea.      Mouth/Throat:      Mouth: Mucous membranes are moist.   Eyes:      General: No scleral icterus.     Extraocular Movements: Extraocular movements intact.      Conjunctiva/sclera: Conjunctivae normal.      Pupils: Pupils are equal, round, and reactive to light.   Cardiovascular:      Rate and Rhythm: Normal rate and regular rhythm.      Pulses: Normal pulses.      Heart sounds: Normal heart sounds. No murmur heard.     No gallop.      Comments: Patient is wearing life vest  Pulmonary:      Effort: Pulmonary effort is normal. No respiratory distress.      Breath sounds: Normal breath sounds. No stridor. No wheezing, rhonchi or rales.   Abdominal:      General: Abdomen is flat. Bowel sounds are normal.      Palpations: Abdomen is soft.      Tenderness: There is no abdominal tenderness. There is no right CVA tenderness, left CVA tenderness, guarding or rebound.   Musculoskeletal:         General: No swelling or deformity. Normal range of motion.      Cervical back: Normal range of motion and neck supple. No rigidity or tenderness.      Right lower leg: No edema.      Left lower leg: No edema.   Lymphadenopathy:      Cervical: " No cervical adenopathy.   Skin:     General: Skin is warm.      Coloration: Skin is not jaundiced.      Findings: No erythema or lesion.   Neurological:      General: No focal deficit present.      Mental Status: He is alert and oriented to person, place, and time.      Cranial Nerves: No cranial nerve deficit.      Motor: No weakness.      Coordination: Coordination normal.      Gait: Gait normal.   Psychiatric:         Mood and Affect: Mood normal.         Behavior: Behavior normal.         Thought Content: Thought content normal.         Judgment: Judgment normal.       Assessment/Plan   Problem List Items Addressed This Visit          Cardiac and Vasculature    CHF (congestive heart failure), NYHA class I, acute on chronic, combined (CMS/HCC) - Primary     No anginal symptoms. He is on anti anginal medicine and life vest for total of 90 days. He is going to follow up with Cardiology.  Echocardiogram has been reviewed and discussed with the patient.         Relevant Orders    Disability Placard    Hypertension secondary to other renal disorders     BP is 128/76.  He is on Hydralazine.         Pure hypercholesterolemia     LDL level in the hospital was 113.  Patient is on Atorvastatin now.            Endocrine/Metabolic    Overweight with body mass index (BMI) of 27 to 27.9 in adult     Dietary modification as discussed for weight loss.  Activity as per suggestion of Cardiologist for now.            Genitourinary and Reproductive    Chronic kidney disease (CKD), stage IV (severe) (CMS/HCC)     Patient is going to see Nephrology.  Reviewed his baseline and compared renal function. He is keeping up with hydration and no urinary symptoms.          Other Visit Diagnoses       Never smoked cigarettes

## 2024-03-21 NOTE — ASSESSMENT & PLAN NOTE
No anginal symptoms. He is on anti anginal medicine and life vest for total of 90 days. He is going to follow up with Cardiology.  Echocardiogram has been reviewed and discussed with the patient.

## 2024-03-22 DIAGNOSIS — N18.4 CHRONIC KIDNEY DISEASE, STAGE 4 (SEVERE) (MULTI): Primary | ICD-10-CM

## 2024-03-27 ENCOUNTER — APPOINTMENT (OUTPATIENT)
Dept: CARDIOLOGY | Facility: CLINIC | Age: 79
End: 2024-03-27
Payer: MEDICARE

## 2024-04-01 DIAGNOSIS — I50.22 CHRONIC SYSTOLIC HEART FAILURE (MULTI): ICD-10-CM

## 2024-04-01 RX ORDER — FUROSEMIDE 80 MG/1
80 TABLET ORAL DAILY
Qty: 90 TABLET | Refills: 1 | Status: SHIPPED | OUTPATIENT
Start: 2024-04-01 | End: 2024-09-28

## 2024-04-02 NOTE — DOCUMENTATION CLARIFICATION NOTE
"    PATIENT:               JACK ANGULO  ACCT #:                  [id number]  MRN:                       [id number]  :                       1945  ADMIT DATE:       3/10/2024 1:42 PM  DISCH DATE:        3/13/2024 5:25 PM  RESPONDING PROVIDER #:        [id number]          PROVIDER RESPONSE TEXT:    Hyponatremia not clinically significant    CDI QUERY TEXT:    UH_CV Electrolyte        Instruction:    Based on your assessment of the patient and the clinical information, please provide the requested documentation by clicking on the appropriate radio button and enter any additional information if prompted.    Question: Please clarify the diagnosis of hyponatremia    When answering this query, please exercise your independent professional judgment. The fact that a question is being asked, does not imply that any particular answer is desired or expected.    The patient's clinical indicators include:  Clinical Information: Per H/P 3/10/24 by Dr. Triplett \"78 y.o. male with a history of untreated hypertension presented with shortness of breath on mild exertion for 3 weeks.\"    -Documented Diagnosis:  Per progress note 3/11/24 by Dr. Triplett \"hyponatremia\"  -Laboratory Results: Sodium levels  3/10/24  134,   3/11/24   133,   3/12/24   136  -Clinical Manifestations/Physical Exam Findings: Per H/P 3/10/24 by Dr. Triplett \"alert, cooperative, or no distress  Alert and oriented x 3\"    Treatment: Monitoring labs, IVF    Risk Factors: Age, untreated HTN, CKD, CHF, CAD  Options provided:  -- Hyponatremia clinically significant, as evidenced by and treated with, Please specify additional information below  -- Hyponatremia is ruled out  -- Other - I will add my own diagnosis  -- Refer to Clinical Documentation Reviewer    Query created by: Abdirizak Quick on 3/12/2024 4:55 PM      Electronically signed by:  PHAM TRPILETT MD 2024 9:09 AM          "

## 2024-04-10 DIAGNOSIS — I50.9 ACUTE HEART FAILURE, UNSPECIFIED HEART FAILURE TYPE (MULTI): ICD-10-CM

## 2024-04-10 RX ORDER — ISOSORBIDE MONONITRATE 30 MG/1
30 TABLET, EXTENDED RELEASE ORAL DAILY
Qty: 90 TABLET | Refills: 3 | Status: SHIPPED | OUTPATIENT
Start: 2024-04-10 | End: 2025-04-10

## 2024-04-15 NOTE — DISCHARGE SUMMARY
Inpatient Discharge Summary      Admitting Provider: Jerson Triplett MD Discharge Provider: No att. providers found   Admission Date: 3/10/2024   Discharge Date: 3/13/2024    Primary Care Physician at Discharge: Mansoor Hinkle [phone]   Discharge Diagnosis     Patient Active Problem List   Diagnosis        Acute heart failure (Multi)    Hypertensive urgency    Pneumonia due to infectious organism    CHF (congestive heart failure), NYHA class I, acute on chronic, combined (Multi)    Chronic kidney disease (CKD), stage IV (severe) (Multi)    Hypertension secondary to other renal disorders    Pure hypercholesterolemia    Overweight with body mass index (BMI) of 27 to 27.9 in adult     Heart failure due to high blood pressure (Multi) [I11.0]  Hypertensive urgency [I16.0]  Acute heart failure, unspecified heart failure type (Multi) [I50.9]  Pneumonia due to infectious organism, unspecified laterality, unspecified part of lung [J18.9]  Chronic renal impairment, unspecified CKD stage [N18.9]  CHF (congestive heart failure), NYHA class I, acute on chronic, combined (Multi) [I50.43]  Discharge Disposition  Home  DETAILS OF HOSPITAL STAY   Presenting Problem/History of Present Illness  Hypertensive urgency  Tom Haas is a 78 y.o. male with a history of untreated hypertension presented with shortness of breath on mild exertion for 3 weeks. He denies any chest pain orthopnea or ankle swelling. No cough.   Physical Exam at Discharge  Discharge Condition: fair  Heart Rate: 85  Resp: 18  BP: 108/62  Temp: 35.8 °C (96.5 °F)  Weight: 72.6 kg (160 lb)  GENERAL: alert, cooperative, or no distress  SKIN: no rashes  OROPHARYNX:   NECK: supple, no thyromegaly, JVP within normal limits  LUNGS:  not in respiratory distress, respiratory rate normal, clear to auscultation  CARDIAC: rate regular and regular rhythm, normal S1 and S2, no murmur, rub, or gallop heard.  ABDOMEN: Soft, non-tender, normal bowel sounds; no bruits,  organomegaly or masses.  EXTREMETIES: No edema  NEURO: Alert and oriented x 3,   ., reflexes normal and symmetric, strength and  sensation grossly normal     Hospital Course     Operative Procedures Performed    Treatments: cardiac meds: hydralazine  Consults: nephrology and urology  Procedures:  none  Active Issues Requiring Follow-up  .  Outpatient Follow-Up  Future Appointments   Date Time Provider Department Center   4/29/2024 11:15 AM Mansoor Hinkle MD [id number] [facility]   5/13/2024  3:00 PM Kelvin Murphy MD [id number] [facility]       Test Results Pending at Discharge  Pending Labs       Order Current Status    AFB Culture/Smear Preliminary result                              Abnormal finding requiring follow up   Lung nodule repeat CT in 6 months.      Discharge Medications     New Medications   Discharge Medication List as of 3/13/2024  4:40 PM        START taking these medications    Details   aspirin 81 mg chewable tablet Chew 1 tablet (81 mg) once daily. Do not start before March 14, 2024., Starting Thu 3/14/2024, Normal      atorvastatin (Lipitor) 20 mg tablet Take 1 tablet (20 mg) by mouth once daily at bedtime., Starting Wed 3/13/2024, Normal      carvedilol (Coreg) 6.25 mg tablet Take 1 tablet (6.25 mg) by mouth 2 times a day., Starting Wed 3/13/2024, Normal      cholecalciferol (Vitamin D-3) 50 MCG (2000 UT) tablet Take 1 tablet (2,000 Units) by mouth once daily. Do not start before March 14, 2024., Starting Thu 3/14/2024, Normal      hydrALAZINE (Apresoline) 10 mg tablet Take 1 tablet (10 mg) by mouth 2 times a day., Starting Wed 3/13/2024, Normal      doxycycline (Vibramycin) 100 mg capsule Take 1 capsule (100 mg) by mouth every 12 hours. Take with a full glass of water and do not lie down for at least 30 minutes after., Starting Wed 3/13/2024, Normal      isosorbide mononitrate ER (Imdur) 30 mg 24 hr tablet Take 1 tablet (30 mg) by mouth once daily. Do not crush or chew. Do not start before March 14, 2024.,  Starting u 3/14/2024, Normal              Medication discontinued during hospitalization   Medications Discontinued During This Encounter   Medication Reason    sodium chloride 0.9% infusion     amLODIPine (Norvasc) tablet 10 mg     furosemide (Lasix) injection 80 mg     azithromycin (Zithromax) in dextrose 5 % in water (D5W) 250 mL  mg     cefTRIAXone (Rocephin) in dextrose 5 % water (D5W) 50 mL IV 2 g     furosemide (Lasix) injection 40 mg     cholecalciferol (Vitamin D-3) tablet 2,000 Units Patient Discharge    calcitriol (Calcijex) injection 0.5 mcg Patient Discharge    hydrALAZINE (Apresoline) tablet 10 mg Patient Discharge    doxycycline (Vibra-Tabs) tablet 100 mg Patient Discharge    carvedilol (Coreg) tablet 6.25 mg Patient Discharge    isosorbide mononitrate ER (Imdur) 24 hr tablet 30 mg Patient Discharge    atorvastatin (Lipitor) tablet 20 mg Patient Discharge    aspirin chewable tablet 81 mg Patient Discharge    perflutren protein A microsphere (Optison) injection 0.5 mL Patient Discharge    sulfur hexafluoride microsphr (Lumason) injection 24.28 mg Patient Discharge    polyethylene glycol (Glycolax, Miralax) packet 17 g Patient Discharge    enoxaparin (Lovenox) syringe 30 mg Patient Discharge    hydrALAZINE (Apresoline) injection 5 mg Patient Discharge      Discharge Medication List as of 3/13/2024  4:40 PM         Discharge Medication List as of 3/13/2024  4:40 PM           Medication discontinued during hospitalization   Medications Discontinued During This Encounter   Medication Reason    sodium chloride 0.9% infusion     amLODIPine (Norvasc) tablet 10 mg     furosemide (Lasix) injection 80 mg     azithromycin (Zithromax) in dextrose 5 % in water (D5W) 250 mL  mg     cefTRIAXone (Rocephin) in dextrose 5 % water (D5W) 50 mL IV 2 g     furosemide (Lasix) injection 40 mg     cholecalciferol (Vitamin D-3) tablet 2,000 Units Patient Discharge    calcitriol (Calcijex) injection 0.5 mcg Patient  Discharge    hydrALAZINE (Apresoline) tablet 10 mg Patient Discharge    doxycycline (Vibra-Tabs) tablet 100 mg Patient Discharge    carvedilol (Coreg) tablet 6.25 mg Patient Discharge    isosorbide mononitrate ER (Imdur) 24 hr tablet 30 mg Patient Discharge    atorvastatin (Lipitor) tablet 20 mg Patient Discharge    aspirin chewable tablet 81 mg Patient Discharge    perflutren protein A microsphere (Optison) injection 0.5 mL Patient Discharge    sulfur hexafluoride microsphr (Lumason) injection 24.28 mg Patient Discharge    polyethylene glycol (Glycolax, Miralax) packet 17 g Patient Discharge    enoxaparin (Lovenox) syringe 30 mg Patient Discharge    hydrALAZINE (Apresoline) injection 5 mg Patient Discharge        Medication that have Changed   Discharge Medication List as of 3/13/2024  4:40 PM         Discharge Medication List as of 3/13/2024  4:40 PM           Discharge Medications      Your medication list        START taking these medications        Instructions Last Dose Given Next Dose Due   aspirin 81 mg chewable tablet  Start taking on: March 14, 2024      Chew 1 tablet (81 mg) once daily. Do not start before March 14, 2024.       atorvastatin 20 mg tablet  Commonly known as: Lipitor      Take 1 tablet (20 mg) by mouth once daily at bedtime.       carvedilol 6.25 mg tablet  Commonly known as: Coreg      Take 1 tablet (6.25 mg) by mouth 2 times a day.       cholecalciferol 50 MCG (2000 UT) tablet  Commonly known as: Vitamin D-3  Start taking on: March 14, 2024      Take 1 tablet (2,000 Units) by mouth once daily. Do not start before March 14, 2024.       doxycycline 100 mg capsule  Commonly known as: Vibramycin      Take 1 capsule (100 mg) by mouth every 12 hours. Take with a full glass of water and do not lie down for at least 30 minutes after.       hydrALAZINE 10 mg tablet  Commonly known as: Apresoline      Take 1 tablet (10 mg) by mouth 2 times a day.       isosorbide mononitrate ER 30 mg 24 hr  tablet  Commonly known as: Imdur  Start taking on: March 14, 2024      Take 1 tablet (30 mg) by mouth once daily. Do not crush or chew. Do not start before March 14, 2024.                 Where to Get Your Medications        These medications were sent to [facility]/pharmacy #[id number] - [place], [place] - [address]   [address], [facility] [address]      Hours: 24-hours Phone: [phone]   aspirin 81 mg chewable tablet  atorvastatin 20 mg tablet  carvedilol 6.25 mg tablet  cholecalciferol 50 MCG (2000 UT) tablet  doxycycline 100 mg capsule  hydrALAZINE 10 mg tablet  isosorbide mononitrate ER 30 mg 24 hr tablet          Outpatient Follow-Up  Future Appointments   Date Time Provider Department [place]   4/29/2024 11:15 AM Mansoor Hinkle MD [id number] [facility]   5/13/2024  3:00 PM Kelvin Murphy MD [id number] [facility]  [address] [place] [address], [address] [place] [address]  Office Phone [phone]

## 2024-04-23 DIAGNOSIS — R73.09 ABNORMAL GLUCOSE: Primary | ICD-10-CM

## 2024-04-24 ENCOUNTER — LAB (OUTPATIENT)
Dept: LAB | Facility: LAB | Age: 79
End: 2024-04-24
Payer: MEDICARE

## 2024-04-24 ENCOUNTER — TELEPHONE (OUTPATIENT)
Dept: PRIMARY CARE | Facility: CLINIC | Age: 79
End: 2024-04-24

## 2024-04-24 DIAGNOSIS — N18.4 CHRONIC KIDNEY DISEASE, STAGE 4 (SEVERE) (MULTI): ICD-10-CM

## 2024-04-24 LAB
ALBUMIN SERPL BCP-MCNC: 4.2 G/DL (ref 3.4–5)
ANION GAP SERPL CALC-SCNC: 19 MMOL/L (ref 10–20)
BUN SERPL-MCNC: 91 MG/DL (ref 6–23)
CALCIUM SERPL-MCNC: 9.3 MG/DL (ref 8.6–10.3)
CHLORIDE SERPL-SCNC: 100 MMOL/L (ref 98–107)
CO2 SERPL-SCNC: 23 MMOL/L (ref 21–32)
CREAT SERPL-MCNC: 3.81 MG/DL (ref 0.5–1.3)
EGFRCR SERPLBLD CKD-EPI 2021: 15 ML/MIN/1.73M*2
GLUCOSE SERPL-MCNC: 91 MG/DL (ref 74–99)
PHOSPHATE SERPL-MCNC: 5.6 MG/DL (ref 2.5–4.9)
POTASSIUM SERPL-SCNC: 4.8 MMOL/L (ref 3.5–5.3)
SODIUM SERPL-SCNC: 137 MMOL/L (ref 136–145)

## 2024-04-24 PROCEDURE — 80069 RENAL FUNCTION PANEL: CPT

## 2024-04-24 PROCEDURE — 36415 COLL VENOUS BLD VENIPUNCTURE: CPT

## 2024-04-24 NOTE — TELEPHONE ENCOUNTER
Paris from  office called requesting a call back.    Call back number: [phone]  Office hours 9am-4pm

## 2024-04-29 ENCOUNTER — OFFICE VISIT (OUTPATIENT)
Dept: PRIMARY CARE | Facility: CLINIC | Age: 79
End: 2024-04-29
Payer: MEDICARE

## 2024-04-29 VITALS
HEIGHT: 65 IN | DIASTOLIC BLOOD PRESSURE: 82 MMHG | HEART RATE: 81 BPM | BODY MASS INDEX: 27.66 KG/M2 | SYSTOLIC BLOOD PRESSURE: 134 MMHG | TEMPERATURE: 98.1 F | WEIGHT: 166 LBS | OXYGEN SATURATION: 98 %

## 2024-04-29 DIAGNOSIS — Z00.00 ROUTINE GENERAL MEDICAL EXAMINATION AT HEALTH CARE FACILITY: Primary | ICD-10-CM

## 2024-04-29 PROCEDURE — 1158F ADVNC CARE PLAN TLK DOCD: CPT | Performed by: INTERNAL MEDICINE

## 2024-04-29 PROCEDURE — 1159F MED LIST DOCD IN RCRD: CPT | Performed by: INTERNAL MEDICINE

## 2024-04-29 PROCEDURE — 1123F ACP DISCUSS/DSCN MKR DOCD: CPT | Performed by: INTERNAL MEDICINE

## 2024-04-29 PROCEDURE — G0439 PPPS, SUBSEQ VISIT: HCPCS | Performed by: INTERNAL MEDICINE

## 2024-04-29 PROCEDURE — 1036F TOBACCO NON-USER: CPT | Performed by: INTERNAL MEDICINE

## 2024-04-29 PROCEDURE — 3075F SYST BP GE 130 - 139MM HG: CPT | Performed by: INTERNAL MEDICINE

## 2024-04-29 PROCEDURE — 1170F FXNL STATUS ASSESSED: CPT | Performed by: INTERNAL MEDICINE

## 2024-04-29 PROCEDURE — 1160F RVW MEDS BY RX/DR IN RCRD: CPT | Performed by: INTERNAL MEDICINE

## 2024-04-29 PROCEDURE — 3079F DIAST BP 80-89 MM HG: CPT | Performed by: INTERNAL MEDICINE

## 2024-04-29 ASSESSMENT — ENCOUNTER SYMPTOMS
FLANK PAIN: 0
CHEST TIGHTNESS: 0
EYE REDNESS: 0
WOUND: 0
FACIAL ASYMMETRY: 0
CONFUSION: 0
RHINORRHEA: 0
HYPERACTIVE: 0
ABDOMINAL DISTENTION: 0
BACK PAIN: 0
NECK STIFFNESS: 0
SPEECH DIFFICULTY: 0
EYE DISCHARGE: 0
VOICE CHANGE: 0
HEADACHES: 0
VOMITING: 0
FEVER: 0
WEAKNESS: 0
ABDOMINAL PAIN: 0
POLYPHAGIA: 0
COUGH: 0
TREMORS: 0
DYSURIA: 0
NAUSEA: 0
HALLUCINATIONS: 0
DYSPHORIC MOOD: 0
CHOKING: 0
DIZZINESS: 0
PHOTOPHOBIA: 0
BLOOD IN STOOL: 0
STRIDOR: 0
SINUS PRESSURE: 0
NUMBNESS: 0
BRUISES/BLEEDS EASILY: 0
PALPITATIONS: 0
FATIGUE: 0
POLYDIPSIA: 0
SINUS PAIN: 0
COLOR CHANGE: 0
TROUBLE SWALLOWING: 0
SHORTNESS OF BREATH: 0
MYALGIAS: 0
WHEEZING: 0
ARTHRALGIAS: 0
CONSTIPATION: 0
ACTIVITY CHANGE: 0
SEIZURES: 0
APPETITE CHANGE: 0
DIARRHEA: 0
SORE THROAT: 0
FREQUENCY: 0
NERVOUS/ANXIOUS: 0
DIFFICULTY URINATING: 0

## 2024-04-29 ASSESSMENT — ANXIETY QUESTIONNAIRES
1. FEELING NERVOUS, ANXIOUS, OR ON EDGE: NOT AT ALL
7. FEELING AFRAID AS IF SOMETHING AWFUL MIGHT HAPPEN: NOT AT ALL
4. TROUBLE RELAXING: NOT AT ALL
5. BEING SO RESTLESS THAT IT IS HARD TO SIT STILL: NOT AT ALL
GAD7 TOTAL SCORE: 0
3. WORRYING TOO MUCH ABOUT DIFFERENT THINGS: NOT AT ALL
2. NOT BEING ABLE TO STOP OR CONTROL WORRYING: NOT AT ALL
6. BECOMING EASILY ANNOYED OR IRRITABLE: NOT AT ALL

## 2024-04-29 ASSESSMENT — ACTIVITIES OF DAILY LIVING (ADL)
TOILETING: INDEPENDENT
DRESSING: INDEPENDENT
PATIENT'S MEMORY ADEQUATE TO SAFELY COMPLETE DAILY ACTIVITIES?: YES
BATHING: INDEPENDENT
TOILETING: INDEPENDENT
GROCERY_SHOPPING: INDEPENDENT
HEARING - LEFT EAR: FUNCTIONAL
WALKS IN HOME: INDEPENDENT
TAKING_MEDICATION: INDEPENDENT
FEEDING YOURSELF: INDEPENDENT
DRESSING YOURSELF: INDEPENDENT
MANAGING_FINANCES: INDEPENDENT
DOING_HOUSEWORK: INDEPENDENT
FEEDING YOURSELF: INDEPENDENT
GROOMING: INDEPENDENT
ADEQUATE_TO_COMPLETE_ADL: YES
JUDGMENT_ADEQUATE_SAFELY_COMPLETE_DAILY_ACTIVITIES: YES
ADEQUATE_TO_COMPLETE_ADL: YES
JUDGMENT_ADEQUATE_SAFELY_COMPLETE_DAILY_ACTIVITIES: YES
PATIENT'S MEMORY ADEQUATE TO SAFELY COMPLETE DAILY ACTIVITIES?: YES
HEARING - RIGHT EAR: FUNCTIONAL
BATHING: INDEPENDENT
GROOMING: INDEPENDENT

## 2024-04-29 ASSESSMENT — PATIENT HEALTH QUESTIONNAIRE - PHQ9
1. LITTLE INTEREST OR PLEASURE IN DOING THINGS: NOT AT ALL
2. FEELING DOWN, DEPRESSED OR HOPELESS: NOT AT ALL
SUM OF ALL RESPONSES TO PHQ9 QUESTIONS 1 AND 2: 0

## 2024-04-29 NOTE — PROGRESS NOTES
Subjective   Reason for Visit: Tom Haas is an 78 y.o. male here for a Medicare Wellness visit.     Past Medical, Surgical, and Family History reviewed and updated in chart.    Reviewed all medications by prescribing practitioner or clinical pharmacist (such as prescriptions, OTCs, herbal therapies and supplements) and documented in the medical record.    HPI  Patient presented to the office for Medicare wellness visit.  He is compliant with all of his medicine.  I discussed with him about the importance of the vaccination and he said he has not make up his mind yet and will think about it.    Patient Care Team:  Mansoor Hinkle MD as PCP - General (Internal Medicine)     Review of Systems   Constitutional:  Negative for activity change, appetite change, fatigue and fever.   HENT:  Negative for congestion, dental problem, ear pain, hearing loss, rhinorrhea, sinus pressure, sinus pain, sore throat, trouble swallowing and voice change.    Eyes:  Negative for photophobia, discharge, redness and visual disturbance.   Respiratory:  Negative for cough, choking, chest tightness, shortness of breath, wheezing and stridor.    Cardiovascular:  Negative for chest pain, palpitations and leg swelling.   Gastrointestinal:  Negative for abdominal distention, abdominal pain, blood in stool, constipation, diarrhea, nausea and vomiting.   Endocrine: Negative for polydipsia, polyphagia and polyuria.   Genitourinary:  Negative for difficulty urinating, dysuria, flank pain, frequency and urgency.   Musculoskeletal:  Negative for arthralgias, back pain, myalgias and neck stiffness.   Skin:  Negative for color change, rash and wound.   Allergic/Immunologic: Negative for immunocompromised state.   Neurological:  Negative for dizziness, tremors, seizures, syncope, facial asymmetry, speech difficulty, weakness, numbness and headaches.   Hematological:  Does not bruise/bleed easily.   Psychiatric/Behavioral:  Negative for behavioral problems,  "confusion, dysphoric mood, hallucinations, self-injury and suicidal ideas. The patient is not nervous/anxious and is not hyperactive.      Objective   Vitals:  /82   Pulse 81   Temp 36.7 °C (98.1 °F)   Ht 1.651 m (5' 5\")   Wt 75.3 kg (166 lb)   SpO2 98%   BMI 27.62 kg/m²       Physical Exam  Vitals and nursing note reviewed.   Constitutional:       General: He is not in acute distress.     Appearance: Normal appearance. He is not ill-appearing or toxic-appearing.   HENT:      Head: Normocephalic and atraumatic.      Nose: Nose normal. No congestion or rhinorrhea.      Mouth/Throat:      Mouth: Mucous membranes are moist.   Eyes:      General: No scleral icterus.     Extraocular Movements: Extraocular movements intact.      Conjunctiva/sclera: Conjunctivae normal.      Pupils: Pupils are equal, round, and reactive to light.   Cardiovascular:      Rate and Rhythm: Normal rate and regular rhythm.      Pulses: Normal pulses.      Heart sounds: Normal heart sounds. No murmur heard.     Comments: Life vest is in place.  Pulmonary:      Effort: Pulmonary effort is normal. No respiratory distress.      Breath sounds: Normal breath sounds. No wheezing, rhonchi or rales.   Abdominal:      General: Bowel sounds are normal.      Tenderness: There is no abdominal tenderness.   Musculoskeletal:         General: No swelling or deformity. Normal range of motion.      Cervical back: Normal range of motion and neck supple. No rigidity or tenderness.      Right lower leg: No edema.      Left lower leg: No edema.   Lymphadenopathy:      Cervical: No cervical adenopathy.   Skin:     General: Skin is warm.      Coloration: Skin is not jaundiced.      Findings: No erythema or lesion.   Neurological:      General: No focal deficit present.      Mental Status: He is alert and oriented to person, place, and time.      Cranial Nerves: No cranial nerve deficit.      Motor: No weakness.      Coordination: Coordination normal.      " Gait: Gait normal.   Psychiatric:         Mood and Affect: Mood normal.         Behavior: Behavior normal.         Thought Content: Thought content normal.         Judgment: Judgment normal.       Assessment/Plan   Problem List Items Addressed This Visit    None  Visit Diagnoses       Routine general medical examination at health care facility    -  Primary

## 2024-05-01 DIAGNOSIS — D63.1 ANEMIA DUE TO STAGE 4 CHRONIC KIDNEY DISEASE (MULTI): Primary | ICD-10-CM

## 2024-05-01 DIAGNOSIS — N18.4 CHRONIC KIDNEY DISEASE, STAGE 4 (SEVERE) (MULTI): ICD-10-CM

## 2024-05-01 DIAGNOSIS — N18.4 ANEMIA DUE TO STAGE 4 CHRONIC KIDNEY DISEASE (MULTI): Primary | ICD-10-CM

## 2024-05-01 LAB
ACID FAST STN SPEC: NORMAL
MYCOBACTERIUM SPEC CULT: NORMAL

## 2024-05-13 ENCOUNTER — OFFICE VISIT (OUTPATIENT)
Dept: CARDIOLOGY | Facility: HOSPITAL | Age: 79
End: 2024-05-13
Payer: MEDICARE

## 2024-05-13 VITALS
WEIGHT: 168.4 LBS | DIASTOLIC BLOOD PRESSURE: 100 MMHG | HEART RATE: 100 BPM | BODY MASS INDEX: 28.06 KG/M2 | SYSTOLIC BLOOD PRESSURE: 195 MMHG | OXYGEN SATURATION: 98 % | HEIGHT: 65 IN

## 2024-05-13 DIAGNOSIS — I10 HTN (HYPERTENSION), BENIGN: ICD-10-CM

## 2024-05-13 DIAGNOSIS — I43 CARDIOMYOPATHY DUE TO HYPERTENSION, WITH HEART FAILURE (MULTI): ICD-10-CM

## 2024-05-13 DIAGNOSIS — I35.0 NONRHEUMATIC AORTIC VALVE STENOSIS: ICD-10-CM

## 2024-05-13 DIAGNOSIS — I25.10 CORONARY ARTERY DISEASE INVOLVING NATIVE CORONARY ARTERY OF NATIVE HEART WITHOUT ANGINA PECTORIS: ICD-10-CM

## 2024-05-13 DIAGNOSIS — I50.20 HFREF (HEART FAILURE WITH REDUCED EJECTION FRACTION) (MULTI): Primary | ICD-10-CM

## 2024-05-13 DIAGNOSIS — I11.0 CARDIOMYOPATHY DUE TO HYPERTENSION, WITH HEART FAILURE (MULTI): ICD-10-CM

## 2024-05-13 PROBLEM — I50.9 ACUTE HEART FAILURE (MULTI): Status: RESOLVED | Noted: 2024-03-10 | Resolved: 2024-05-13

## 2024-05-13 PROCEDURE — 3080F DIAST BP >= 90 MM HG: CPT | Performed by: INTERNAL MEDICINE

## 2024-05-13 PROCEDURE — 99214 OFFICE O/P EST MOD 30 MIN: CPT | Performed by: INTERNAL MEDICINE

## 2024-05-13 PROCEDURE — 1159F MED LIST DOCD IN RCRD: CPT | Performed by: INTERNAL MEDICINE

## 2024-05-13 PROCEDURE — 1036F TOBACCO NON-USER: CPT | Performed by: INTERNAL MEDICINE

## 2024-05-13 PROCEDURE — 93005 ELECTROCARDIOGRAM TRACING: CPT | Performed by: INTERNAL MEDICINE

## 2024-05-13 PROCEDURE — 1160F RVW MEDS BY RX/DR IN RCRD: CPT | Performed by: INTERNAL MEDICINE

## 2024-05-13 PROCEDURE — 3077F SYST BP >= 140 MM HG: CPT | Performed by: INTERNAL MEDICINE

## 2024-05-13 RX ORDER — CARVEDILOL 6.25 MG/1
6.25 TABLET ORAL 2 TIMES DAILY
Qty: 180 TABLET | Refills: 3 | Status: SHIPPED | OUTPATIENT
Start: 2024-05-13 | End: 2025-05-13

## 2024-05-13 RX ORDER — ATORVASTATIN CALCIUM 20 MG/1
20 TABLET, FILM COATED ORAL NIGHTLY
Qty: 90 TABLET | Refills: 3 | Status: SHIPPED | OUTPATIENT
Start: 2024-05-13 | End: 2025-05-13

## 2024-05-13 RX ORDER — HYDRALAZINE HYDROCHLORIDE 10 MG/1
10 TABLET, FILM COATED ORAL 2 TIMES DAILY
Qty: 180 TABLET | Refills: 3 | Status: SHIPPED | OUTPATIENT
Start: 2024-05-13 | End: 2024-05-13

## 2024-05-13 RX ORDER — HYDRALAZINE HYDROCHLORIDE 25 MG/1
25 TABLET, FILM COATED ORAL 2 TIMES DAILY
Qty: 180 TABLET | Refills: 3 | Status: SHIPPED | OUTPATIENT
Start: 2024-05-13 | End: 2025-05-13

## 2024-05-13 NOTE — PROGRESS NOTES
"Chief Complaint:   Heart Failure, Hypertension, and Hyperlipidemia     History Of Present Illness:    Tom Haas is a 78 y.o. male here for followup. He has a history of CAD by [facility] '05 (Lcx 80-90%, LAD 50-60%, RCA 40%), carotid stenosis, hypertension, hyperlipidemia, CKD (Cr 1.37 7/2020), & hydronephrosis. Had limited medical followup through the years. He was hospitalized 3/2024 with acute systolic HF and worsened CKD (stage IV-V). Low flow / low gradient aortic stenosis was also noted. Troponin peaked at > 4,000 (non-MI troponin elevation / acute non-traumatic myocardial injury).    He reports doing well. Brings a log of his home BP's which range typically in the 130's systolic. Ran out of several of his cardiac medications yesterday. Has had a stable weight. Denies cardiovascular symptoms.         Last Recorded Vitals:  Vitals:    05/13/24 1510   BP: (!) 195/100   BP Location: Left arm   Pulse: 100   SpO2: 98%   Weight: 76.4 kg (168 lb 6.4 oz)   Height: 1.651 m (5' 5\")             Allergies:  Patient has no known allergies.    Outpatient Medications:  Current Outpatient Medications   Medication Instructions    aspirin 81 mg, oral, Daily    atorvastatin (LIPITOR) 20 mg, oral, Nightly    carvedilol (COREG) 6.25 mg, oral, 2 times daily    cholecalciferol (VITAMIN D-3) 2,000 Units, oral, Daily    furosemide (LASIX) 80 mg, oral, Daily    hydrALAZINE (APRESOLINE) 10 mg, oral, 2 times daily    isosorbide mononitrate ER (IMDUR) 30 mg, oral, Daily, Do not crush or chew.         Physical Exam:  Gen Well appearing late septuagenarian male sitting up in NAD. Body mass index is 28.02 kg/m².   CV rrr. 1/6 JM. No JVD or leg edema. Pulses 2+ and symmetric.    Pulm Lungs clear with normal respiratory effort.  Neuro Alert and conversant. Grossly nonfocal.       I reviewed the patient's ECG -  ST. First degree AV block. LVH with repolarization abnormality.     I reviewed most recent imaging / labs / and office notes as well as " details of any recent hospitalizations or ED visits.    Assessment/Plan   1.  HFrEF  Cardiomyopathy NOS. Will defer coronary angiography but (if EF fails to recover) plan on stress testing at some point to assess for exercise limitations and potential cardiac rehab. Will repeat TTE next month to determine ICD referral vs. dc of Lifevest. No plans for SGLT2-I/Aldactone/ACE/ARB/ARNI due to his significant renal dysfunction.     2. Hypertension   BP uncontrolled. Better with home checks and missed his Coreg / hydralazine / Imdur. Refills provided and will increase his hydralazine dose.    3. Coronary artery disease  Some obstructive disease noted '05 in the Cx and non-obstructive disease elsewhere. On indefinite aspirin / statin.     4. Aortic stenosis  Low flow / low gradient. For reassessment next month though will likely not pursue valve replacement given angiography would be part of the workup and would likely lead to IHD.        Followup 2 months.        Kelvin Murphy MD

## 2024-05-17 LAB
ATRIAL RATE: 100 BPM
P AXIS: 59 DEGREES
P OFFSET: 168 MS
P ONSET: 112 MS
PR INTERVAL: 208 MS
Q ONSET: 216 MS
QRS COUNT: 16 BEATS
QRS DURATION: 114 MS
QT INTERVAL: 342 MS
QTC CALCULATION(BAZETT): 441 MS
QTC FREDERICIA: 405 MS
R AXIS: -57 DEGREES
T AXIS: 126 DEGREES
T OFFSET: 387 MS
VENTRICULAR RATE: 100 BPM

## 2024-06-11 DIAGNOSIS — I50.20 HFREF (HEART FAILURE WITH REDUCED EJECTION FRACTION) (MULTI): ICD-10-CM

## 2024-06-11 DIAGNOSIS — I42.9 CARDIOMYOPATHY, UNSPECIFIED TYPE (MULTI): Primary | ICD-10-CM

## 2024-06-11 DIAGNOSIS — I35.0 NONRHEUMATIC AORTIC VALVE STENOSIS: ICD-10-CM

## 2024-06-12 ENCOUNTER — APPOINTMENT (OUTPATIENT)
Dept: CARDIOLOGY | Facility: HOSPITAL | Age: 79
End: 2024-06-12
Payer: MEDICARE

## 2024-06-12 ENCOUNTER — HOSPITAL ENCOUNTER (OUTPATIENT)
Dept: CARDIOLOGY | Facility: HOSPITAL | Age: 79
Discharge: HOME | End: 2024-06-12
Payer: MEDICARE

## 2024-06-12 DIAGNOSIS — I50.20 HFREF (HEART FAILURE WITH REDUCED EJECTION FRACTION) (MULTI): ICD-10-CM

## 2024-06-12 DIAGNOSIS — I50.9 HEART FAILURE, UNSPECIFIED (MULTI): ICD-10-CM

## 2024-06-12 DIAGNOSIS — I35.0 NONRHEUMATIC AORTIC VALVE STENOSIS: ICD-10-CM

## 2024-06-12 LAB
AORTIC VALVE MEAN GRADIENT: 12.4 MMHG
AORTIC VALVE PEAK VELOCITY: 2.4 M/S
AV PEAK GRADIENT: 23.1 MMHG
AVA (PEAK VEL): 0.91 CM2
AVA (VTI): 0.94 CM2
EJECTION FRACTION APICAL 4 CHAMBER: 19.8
LEFT ATRIUM VOLUME AREA LENGTH INDEX BSA: 43.6 ML/M2
LEFT VENTRICLE INTERNAL DIMENSION DIASTOLE: 5.14 CM (ref 3.5–6)
LEFT VENTRICULAR OUTFLOW TRACT DIAMETER: 2.01 CM
LV EJECTION FRACTION BIPLANE: 24 %
MITRAL VALVE E/E' RATIO: 20.15
RIGHT VENTRICLE FREE WALL PEAK S': 7.38 CM/S
RIGHT VENTRICLE PEAK SYSTOLIC PRESSURE: 43.3 MMHG
TRICUSPID ANNULAR PLANE SYSTOLIC EXCURSION: 1.7 CM

## 2024-06-12 PROCEDURE — 93308 TTE F-UP OR LMTD: CPT | Performed by: INTERNAL MEDICINE

## 2024-06-12 PROCEDURE — 93325 DOPPLER ECHO COLOR FLOW MAPG: CPT | Performed by: INTERNAL MEDICINE

## 2024-06-12 PROCEDURE — 2500000004 HC RX 250 GENERAL PHARMACY W/ HCPCS (ALT 636 FOR OP/ED): Performed by: INTERNAL MEDICINE

## 2024-06-12 PROCEDURE — 93308 TTE F-UP OR LMTD: CPT

## 2024-06-12 PROCEDURE — 93321 DOPPLER ECHO F-UP/LMTD STD: CPT | Performed by: INTERNAL MEDICINE

## 2024-06-12 PROCEDURE — 93321 DOPPLER ECHO F-UP/LMTD STD: CPT

## 2024-06-25 NOTE — PROGRESS NOTES
"I had the pleasure seeing Tom Haas     Chief Complaint   Patient presents with    Cardiomyopathy    Heart Failure     OUTPATIENT CONSULTATION: Cardiac Electrophysiology  DOS: June 26, 2024  REASON:  HFrEF/ Cardiomyopathy, Device Consult   REFERRING:  Kelvin Murphy MD        Current Outpatient Medications   Medication Instructions    aspirin 81 mg, oral, Daily    atorvastatin (LIPITOR) 20 mg, oral, Nightly    carvedilol (COREG) 6.25 mg, oral, 2 times daily    cholecalciferol (VITAMIN D-3) 2,000 Units, oral, Daily    furosemide (LASIX) 80 mg, oral, Daily    hydrALAZINE (APRESOLINE) 25 mg, oral, 2 times daily    isosorbide mononitrate ER (IMDUR) 30 mg, oral, Daily, Do not crush or chew.        Tom Haas is a 79 y.o. with:     HTN (hx hypertensive emergency)  Hydronephrosis, CKD stage IV-V   Carotid Stenosis  CAD - [facility] '05 (Lcx 80-90%, LAD 50-60%, RCA 40%)   Cardiomyopathy / HFrEF -       TESTING    -ECHO/ TTE:  (6/12/24) LVEF 20-25%.  LV mod-sev dilated and global hypokinesis.   Mild-mod MR.  LVIDd 5.14 cm.    -ECHO/ TTE:  (3/11/24) LVEF 30-35%.  LV abn pattern diastolic filling and global hypokinesis. Sev LAE and mild-mod JOY.  LVIDd 5cm.        Objective   Physical Exam  /84 (BP Location: Left arm, Patient Position: Sitting, BP Cuff Size: Adult)   Pulse 83   Resp 16   Ht 1.651 m (5' 5\")   Wt 75.3 kg (166 lb)   SpO2 98%   BMI 27.62 kg/m²     General Appearance:  Alert, cooperative, no distress, appears stated age   Head:  Normocephalic, without obvious abnormality, atraumatic   Eyes:  PERRL, conjunctiva/corneas clear, EOM's intact, fundi benign, both eyes   Ears:  Normal TM's and external ear canals, both ears   Nose: Nares normal, septum midline, mucosa normal, no drainage or sinus tenderness   Throat: Lips, mucosa, and tongue normal; teeth and gums normal   Neck: Supple, symmetrical, trachea midline, no adenopathy, thyroid: not enlarged, symmetric, no tenderness/mass/nodules, no carotid bruit " or JVD   Back:   Symmetric, no curvature, ROM normal, no CVA tenderness   Lungs:   Clear to auscultation bilaterally, respirations unlabored   Chest Wall:  No tenderness or deformity   Heart:  Regular rate and rhythm, S1, S2 normal, no murmur, rub or gallop   Abdomen:   Soft, non-tender, bowel sounds active all four quadrants,  no masses, no organomegaly   Genitalia:  Normal male   Rectal:  Normal tone, normal prostate, no masses or tenderness;  guaiac negative stool   Extremities: Extremities normal, atraumatic, no cyanosis or edema   Pulses: 2+ and symmetric   Skin: Skin color, texture, turgor normal, no rashes or lesions   Lymph nodes: Cervical, supraclavicular, and axillary nodes normal   Neurologic: Normal           Assessment/Plan   Ischemic Cardiomyopathy with persistently depressed EF despite optimal medical therapy. We discussed the risk for sudden cardiac death. The [place] SDM tool was used for shared decision making during this clinic visit. The potential benefits and risks of the procedure were reviewed with the patient based on the best available evidence. Decisions that were made took into account the patient's health goals, preferences, and values. All the patient's questions were addressed. We will proceed with dual chamber defibrillator on October 1, 2024. He prefers to have it done after the summer season.

## 2024-06-26 ENCOUNTER — OFFICE VISIT (OUTPATIENT)
Dept: CARDIOLOGY | Facility: CLINIC | Age: 79
End: 2024-06-26
Payer: MEDICARE

## 2024-06-26 VITALS
HEART RATE: 83 BPM | DIASTOLIC BLOOD PRESSURE: 84 MMHG | OXYGEN SATURATION: 98 % | BODY MASS INDEX: 27.66 KG/M2 | RESPIRATION RATE: 16 BRPM | WEIGHT: 166 LBS | HEIGHT: 65 IN | SYSTOLIC BLOOD PRESSURE: 166 MMHG

## 2024-06-26 DIAGNOSIS — Z01.818 PREOP TESTING: ICD-10-CM

## 2024-06-26 DIAGNOSIS — I50.22 CHRONIC SYSTOLIC HEART FAILURE (MULTI): ICD-10-CM

## 2024-06-26 PROCEDURE — 93005 ELECTROCARDIOGRAM TRACING: CPT | Performed by: INTERNAL MEDICINE

## 2024-06-26 PROCEDURE — 3079F DIAST BP 80-89 MM HG: CPT | Performed by: INTERNAL MEDICINE

## 2024-06-26 PROCEDURE — 1036F TOBACCO NON-USER: CPT | Performed by: INTERNAL MEDICINE

## 2024-06-26 PROCEDURE — 99205 OFFICE O/P NEW HI 60 MIN: CPT | Performed by: INTERNAL MEDICINE

## 2024-06-26 PROCEDURE — 1159F MED LIST DOCD IN RCRD: CPT | Performed by: INTERNAL MEDICINE

## 2024-06-26 PROCEDURE — 3077F SYST BP >= 140 MM HG: CPT | Performed by: INTERNAL MEDICINE

## 2024-06-26 PROCEDURE — 99215 OFFICE O/P EST HI 40 MIN: CPT | Performed by: INTERNAL MEDICINE

## 2024-06-26 ASSESSMENT — PATIENT HEALTH QUESTIONNAIRE - PHQ9
1. LITTLE INTEREST OR PLEASURE IN DOING THINGS: NOT AT ALL
SUM OF ALL RESPONSES TO PHQ9 QUESTIONS 1 AND 2: 0
2. FEELING DOWN, DEPRESSED OR HOPELESS: NOT AT ALL

## 2024-06-26 ASSESSMENT — ENCOUNTER SYMPTOMS: DEPRESSION: 0

## 2024-06-27 LAB
ATRIAL RATE: 88 BPM
P AXIS: 46 DEGREES
P OFFSET: 164 MS
P ONSET: 112 MS
PR INTERVAL: 176 MS
Q ONSET: 217 MS
QRS COUNT: 15 BEATS
QRS DURATION: 114 MS
QT INTERVAL: 376 MS
QTC CALCULATION(BAZETT): 454 MS
QTC FREDERICIA: 427 MS
R AXIS: -30 DEGREES
T AXIS: 133 DEGREES
T OFFSET: 405 MS
VENTRICULAR RATE: 88 BPM

## 2024-07-08 ENCOUNTER — APPOINTMENT (OUTPATIENT)
Dept: CARDIOLOGY | Facility: HOSPITAL | Age: 79
End: 2024-07-08
Payer: MEDICARE

## 2024-07-10 ENCOUNTER — OFFICE VISIT (OUTPATIENT)
Dept: CARDIOLOGY | Facility: HOSPITAL | Age: 79
End: 2024-07-10
Payer: MEDICARE

## 2024-07-10 VITALS
HEART RATE: 94 BPM | HEIGHT: 65 IN | WEIGHT: 163 LBS | BODY MASS INDEX: 27.16 KG/M2 | DIASTOLIC BLOOD PRESSURE: 78 MMHG | SYSTOLIC BLOOD PRESSURE: 154 MMHG

## 2024-07-10 DIAGNOSIS — I25.10 CORONARY ARTERY DISEASE INVOLVING NATIVE CORONARY ARTERY OF NATIVE HEART WITHOUT ANGINA PECTORIS: ICD-10-CM

## 2024-07-10 DIAGNOSIS — I10 HTN (HYPERTENSION), BENIGN: ICD-10-CM

## 2024-07-10 DIAGNOSIS — I35.0 NONRHEUMATIC AORTIC VALVE STENOSIS: ICD-10-CM

## 2024-07-10 DIAGNOSIS — I50.20 HFREF (HEART FAILURE WITH REDUCED EJECTION FRACTION) (MULTI): Primary | ICD-10-CM

## 2024-07-10 PROCEDURE — G2211 COMPLEX E/M VISIT ADD ON: HCPCS | Performed by: INTERNAL MEDICINE

## 2024-07-10 PROCEDURE — 3078F DIAST BP <80 MM HG: CPT | Performed by: INTERNAL MEDICINE

## 2024-07-10 PROCEDURE — 3077F SYST BP >= 140 MM HG: CPT | Performed by: INTERNAL MEDICINE

## 2024-07-10 PROCEDURE — 1159F MED LIST DOCD IN RCRD: CPT | Performed by: INTERNAL MEDICINE

## 2024-07-10 PROCEDURE — 99214 OFFICE O/P EST MOD 30 MIN: CPT | Performed by: INTERNAL MEDICINE

## 2024-07-10 PROCEDURE — 1036F TOBACCO NON-USER: CPT | Performed by: INTERNAL MEDICINE

## 2024-07-10 ASSESSMENT — ENCOUNTER SYMPTOMS
LOSS OF SENSATION IN FEET: 0
OCCASIONAL FEELINGS OF UNSTEADINESS: 0
DEPRESSION: 0

## 2024-07-10 NOTE — PROGRESS NOTES
"Chief Complaint:   Coronary Artery Disease, Hypertension, Hyperlipidemia, and Heart Failure (2 month)     History Of Present Illness:    Tom Haas is a 79 y.o. male here for followup. He has a history of CAD by [facility] '05 (Lcx 80-90%, LAD 50-60%, RCA 40%), carotid stenosis, hypertension, hyperlipidemia, CKD (Cr 1.37 7/2020), & hydronephrosis. Had limited medical followup through the years. He was hospitalized 3/2024 with acute systolic HF and worsened CKD (stage IV-V). Low flow / low gradient aortic stenosis was also noted. Troponin peaked at > 4,000 (non-MI troponin elevation / acute non-traumatic myocardial injury).    He reports doing well. Reports BP's at home in the 100 teens to 130's at the highest (checks daily). Denies cardiovascular symptoms. He was seen by electrophysiology since his last visit and has plans for ICD implantation later this year.        Last Recorded Vitals:  Vitals:    07/10/24 0934 07/10/24 0951   BP: (!) 172/91 154/78   BP Location: Left arm    Patient Position: Sitting    Pulse: 94    Weight: 73.9 kg (163 lb)    Height: 1.651 m (5' 5\")              Allergies:  Patient has no known allergies.    Outpatient Medications:  Current Outpatient Medications   Medication Instructions    aspirin 81 mg, oral, Daily    atorvastatin (LIPITOR) 20 mg, oral, Nightly    carvedilol (COREG) 6.25 mg, oral, 2 times daily    cholecalciferol (VITAMIN D-3) 2,000 Units, oral, Daily    furosemide (LASIX) 80 mg, oral, Daily    hydrALAZINE (APRESOLINE) 25 mg, oral, 2 times daily    isosorbide mononitrate ER (IMDUR) 30 mg, oral, Daily, Do not crush or chew.         Physical Exam:  Gen Well appearing elderly male sitting up in NAD. Body mass index is 27.12 kg/m².   CV rrr. 2/6 JM. No JVD or leg edema.    Pulm Lungs clear with normal respiratory effort.  Neuro Alert and conversant. Grossly nonfocal.        I reviewed most recent imaging / labs / and office notes.      Assessment/Plan   1.  HFrEF  Cardiomyopathy " NOS. Persistent despite medical therapy. For ICD later this year. Will defer coronary angiography until/unless he is on dialysis given his renal dysfunction. No plans for SGLT2-I/Aldactone/ACE/ARB/ARNI due to his significant renal dysfunction.     2. Hypertension   BP uncontrolled today but reasonable with home checks. His present regimen is to continue.     3. Coronary artery disease  Some obstructive disease noted '05 in the Cx and non-obstructive disease elsewhere. On indefinite aspirin / statin. Potential future coronary angiography as above.    4. Aortic stenosis  Low flow / low gradient and persistent by most recent TTE. No plans to pursue valve replacement given angiography would be part of the workup and would likely lead to IHD which we'd like to avoid if at all possible at this stage. Monitoring.        Followup 6 months.        Kelvin Murphy MD

## 2024-07-25 ENCOUNTER — HOSPITAL ENCOUNTER (INPATIENT)
Facility: HOSPITAL | Age: 79
LOS: 1 days | Discharge: OTHER INSTITUTION | End: 2024-07-26
Attending: EMERGENCY MEDICINE | Admitting: INTERNAL MEDICINE
Payer: MEDICARE

## 2024-07-25 DIAGNOSIS — N30.01 ACUTE CYSTITIS WITH HEMATURIA: Primary | ICD-10-CM

## 2024-07-25 DIAGNOSIS — R33.9 URINARY RETENTION: ICD-10-CM

## 2024-07-25 LAB
ALBUMIN SERPL BCP-MCNC: 4.3 G/DL (ref 3.4–5)
ALP SERPL-CCNC: 51 U/L (ref 33–136)
ALT SERPL W P-5'-P-CCNC: 25 U/L (ref 10–52)
ANION GAP SERPL CALC-SCNC: 23 MMOL/L (ref 10–20)
AST SERPL W P-5'-P-CCNC: 29 U/L (ref 9–39)
BASOPHILS # BLD AUTO: 0.01 X10*3/UL (ref 0–0.1)
BASOPHILS NFR BLD AUTO: 0.1 %
BILIRUB SERPL-MCNC: 1.1 MG/DL (ref 0–1.2)
BUN SERPL-MCNC: 75 MG/DL (ref 6–23)
CALCIUM SERPL-MCNC: 9.2 MG/DL (ref 8.6–10.3)
CHLORIDE SERPL-SCNC: 99 MMOL/L (ref 98–107)
CO2 SERPL-SCNC: 15 MMOL/L (ref 21–32)
CREAT SERPL-MCNC: 3.47 MG/DL (ref 0.5–1.3)
EGFRCR SERPLBLD CKD-EPI 2021: 17 ML/MIN/1.73M*2
EOSINOPHIL # BLD AUTO: 0.01 X10*3/UL (ref 0–0.4)
EOSINOPHIL NFR BLD AUTO: 0.1 %
ERYTHROCYTE [DISTWIDTH] IN BLOOD BY AUTOMATED COUNT: 15.9 % (ref 11.5–14.5)
GLUCOSE SERPL-MCNC: 133 MG/DL (ref 74–99)
HCT VFR BLD AUTO: 38.2 % (ref 41–52)
HGB BLD-MCNC: 12.3 G/DL (ref 13.5–17.5)
IMM GRANULOCYTES # BLD AUTO: 0.03 X10*3/UL (ref 0–0.5)
IMM GRANULOCYTES NFR BLD AUTO: 0.3 % (ref 0–0.9)
LYMPHOCYTES # BLD AUTO: 0.66 X10*3/UL (ref 0.8–3)
LYMPHOCYTES NFR BLD AUTO: 5.9 %
MCH RBC QN AUTO: 29.1 PG (ref 26–34)
MCHC RBC AUTO-ENTMCNC: 32.2 G/DL (ref 32–36)
MCV RBC AUTO: 91 FL (ref 80–100)
MONOCYTES # BLD AUTO: 0.62 X10*3/UL (ref 0.05–0.8)
MONOCYTES NFR BLD AUTO: 5.5 %
NEUTROPHILS # BLD AUTO: 9.93 X10*3/UL (ref 1.6–5.5)
NEUTROPHILS NFR BLD AUTO: 88.1 %
NRBC BLD-RTO: 0 /100 WBCS (ref 0–0)
PLATELET # BLD AUTO: 167 X10*3/UL (ref 150–450)
POTASSIUM SERPL-SCNC: 4.6 MMOL/L (ref 3.5–5.3)
PROT SERPL-MCNC: 7.1 G/DL (ref 6.4–8.2)
RBC # BLD AUTO: 4.22 X10*6/UL (ref 4.5–5.9)
SODIUM SERPL-SCNC: 132 MMOL/L (ref 136–145)
WBC # BLD AUTO: 11.3 X10*3/UL (ref 4.4–11.3)

## 2024-07-25 PROCEDURE — 51700 IRRIGATION OF BLADDER: CPT

## 2024-07-25 PROCEDURE — 87086 URINE CULTURE/COLONY COUNT: CPT | Mod: AHULAB | Performed by: PHYSICIAN ASSISTANT

## 2024-07-25 PROCEDURE — 81003 URINALYSIS AUTO W/O SCOPE: CPT | Performed by: PHYSICIAN ASSISTANT

## 2024-07-25 PROCEDURE — 85025 COMPLETE CBC W/AUTO DIFF WBC: CPT | Performed by: PHYSICIAN ASSISTANT

## 2024-07-25 PROCEDURE — 2500000005 HC RX 250 GENERAL PHARMACY W/O HCPCS: Performed by: EMERGENCY MEDICINE

## 2024-07-25 PROCEDURE — 51798 US URINE CAPACITY MEASURE: CPT

## 2024-07-25 PROCEDURE — 80053 COMPREHEN METABOLIC PANEL: CPT | Performed by: PHYSICIAN ASSISTANT

## 2024-07-25 PROCEDURE — 99285 EMERGENCY DEPT VISIT HI MDM: CPT | Mod: 25

## 2024-07-25 PROCEDURE — 36415 COLL VENOUS BLD VENIPUNCTURE: CPT | Performed by: PHYSICIAN ASSISTANT

## 2024-07-25 PROCEDURE — 51702 INSERT TEMP BLADDER CATH: CPT

## 2024-07-25 RX ORDER — LIDOCAINE HYDROCHLORIDE 20 MG/ML
1 JELLY TOPICAL ONCE
Status: COMPLETED | OUTPATIENT
Start: 2024-07-25 | End: 2024-07-25

## 2024-07-25 ASSESSMENT — COLUMBIA-SUICIDE SEVERITY RATING SCALE - C-SSRS
2. HAVE YOU ACTUALLY HAD ANY THOUGHTS OF KILLING YOURSELF?: NO
1. IN THE PAST MONTH, HAVE YOU WISHED YOU WERE DEAD OR WISHED YOU COULD GO TO SLEEP AND NOT WAKE UP?: NO
6. HAVE YOU EVER DONE ANYTHING, STARTED TO DO ANYTHING, OR PREPARED TO DO ANYTHING TO END YOUR LIFE?: NO

## 2024-07-25 ASSESSMENT — PAIN - FUNCTIONAL ASSESSMENT: PAIN_FUNCTIONAL_ASSESSMENT: 0-10

## 2024-07-25 ASSESSMENT — PAIN SCALES - GENERAL
PAINLEVEL_OUTOF10: 0 - NO PAIN
PAINLEVEL_OUTOF10: 0 - NO PAIN

## 2024-07-25 NOTE — ED TRIAGE NOTES
Pt presents with the c/o hematuria. Per patient last night he started passing large amounts of blood. Pt states that he was concerned that he was not passing any urine until today. Pt nephrologist wanted him to be seen d/t he only has one working kidney. Pt denies any discomforts.

## 2024-07-25 NOTE — ED TRIAGE NOTES
TRIAGE NOTE   I saw the patient as the Clinician in Triage and performed a brief history and physical exam, established acuity, and ordered appropriate tests to develop basic plan of care. Patient will be seen by an [facility], resident and/or physician who will independently evaluate the patient. Please see subsequent provider notes for further details and disposition.     Brief HPI: In brief, Tom Haas is a 79 y.o. male that presents for gross hematuria with urinary retention.  Has solitary kidney with CKD under care of nephrology.  Has suprapubic pressure discomfort otherwise no pain.  No back pain.  No fever.  No nausea vomiting.  Takes aspirin no other anticoagulants.  Nephrologist recommended ED visit     Focused Physical exam:   Vital signs are stable.  Blood pressure elevated.  Afebrile.  No tachycardia.  He is awake alert coherent.  Sitting upright in wheelchair appears comfortable.  Does have suprapubic discomfort limited exam but likely distention.  Nonsurgical exam.  Normal mental status.  Plan/MDM:   Workup initiated.  Stable pending bed availability and further evaluation.  Bladder scan urinary retention Rodriguez catheter if needed.  Please see subsequent provider note for further details and disposition

## 2024-07-26 ENCOUNTER — HOSPITAL ENCOUNTER (INPATIENT)
Facility: HOSPITAL | Age: 79
LOS: 1 days | Discharge: HOME | End: 2024-07-27
Attending: EMERGENCY MEDICINE | Admitting: EMERGENCY MEDICINE
Payer: MEDICARE

## 2024-07-26 ENCOUNTER — APPOINTMENT (OUTPATIENT)
Dept: RADIOLOGY | Facility: HOSPITAL | Age: 79
End: 2024-07-26
Payer: MEDICARE

## 2024-07-26 ENCOUNTER — APPOINTMENT (OUTPATIENT)
Dept: PRIMARY CARE | Facility: CLINIC | Age: 79
End: 2024-07-26
Payer: MEDICARE

## 2024-07-26 VITALS
DIASTOLIC BLOOD PRESSURE: 72 MMHG | HEIGHT: 65 IN | WEIGHT: 165 LBS | SYSTOLIC BLOOD PRESSURE: 147 MMHG | TEMPERATURE: 98.1 F | BODY MASS INDEX: 27.49 KG/M2 | OXYGEN SATURATION: 96 % | HEART RATE: 72 BPM | RESPIRATION RATE: 18 BRPM

## 2024-07-26 DIAGNOSIS — R31.9 HEMATURIA, UNSPECIFIED TYPE: Primary | ICD-10-CM

## 2024-07-26 PROBLEM — N30.01 ACUTE CYSTITIS WITH HEMATURIA: Status: ACTIVE | Noted: 2024-07-26

## 2024-07-26 LAB
ANION GAP SERPL CALC-SCNC: 18 MMOL/L (ref 10–20)
APPEARANCE UR: ABNORMAL
BACTERIA #/AREA URNS AUTO: ABNORMAL /HPF
BILIRUB UR STRIP.AUTO-MCNC: NEGATIVE MG/DL
BUN SERPL-MCNC: 72 MG/DL (ref 6–23)
CALCIUM SERPL-MCNC: 8.8 MG/DL (ref 8.6–10.3)
CHLORIDE SERPL-SCNC: 103 MMOL/L (ref 98–107)
CO2 SERPL-SCNC: 17 MMOL/L (ref 21–32)
COLOR UR: ABNORMAL
CREAT SERPL-MCNC: 3.16 MG/DL (ref 0.5–1.3)
EGFRCR SERPLBLD CKD-EPI 2021: 19 ML/MIN/1.73M*2
ERYTHROCYTE [DISTWIDTH] IN BLOOD BY AUTOMATED COUNT: 15.9 % (ref 11.5–14.5)
GLUCOSE SERPL-MCNC: 105 MG/DL (ref 74–99)
GLUCOSE UR STRIP.AUTO-MCNC: NORMAL MG/DL
HCT VFR BLD AUTO: 33.8 % (ref 41–52)
HGB BLD-MCNC: 11 G/DL (ref 13.5–17.5)
KETONES UR STRIP.AUTO-MCNC: NEGATIVE MG/DL
LEUKOCYTE ESTERASE UR QL STRIP.AUTO: ABNORMAL
MCH RBC QN AUTO: 28.6 PG (ref 26–34)
MCHC RBC AUTO-ENTMCNC: 32.5 G/DL (ref 32–36)
MCV RBC AUTO: 88 FL (ref 80–100)
NITRITE UR QL STRIP.AUTO: NEGATIVE
NRBC BLD-RTO: 0 /100 WBCS (ref 0–0)
PH UR STRIP.AUTO: 6 [PH]
PLATELET # BLD AUTO: 145 X10*3/UL (ref 150–450)
POTASSIUM SERPL-SCNC: 4.4 MMOL/L (ref 3.5–5.3)
PROT UR STRIP.AUTO-MCNC: ABNORMAL MG/DL
RBC # BLD AUTO: 3.85 X10*6/UL (ref 4.5–5.9)
RBC # UR STRIP.AUTO: ABNORMAL /UL
RBC #/AREA URNS AUTO: >20 /HPF
SODIUM SERPL-SCNC: 134 MMOL/L (ref 136–145)
SP GR UR STRIP.AUTO: 1.01
SQUAMOUS #/AREA URNS AUTO: ABNORMAL /HPF
UROBILINOGEN UR STRIP.AUTO-MCNC: NORMAL MG/DL
WBC # BLD AUTO: 11 X10*3/UL (ref 4.4–11.3)
WBC #/AREA URNS AUTO: >50 /HPF

## 2024-07-26 PROCEDURE — 2500000001 HC RX 250 WO HCPCS SELF ADMINISTERED DRUGS (ALT 637 FOR MEDICARE OP): Performed by: INTERNAL MEDICINE

## 2024-07-26 PROCEDURE — 99222 1ST HOSP IP/OBS MODERATE 55: CPT | Performed by: INTERNAL MEDICINE

## 2024-07-26 PROCEDURE — 82435 ASSAY OF BLOOD CHLORIDE: CPT | Performed by: INTERNAL MEDICINE

## 2024-07-26 PROCEDURE — 2500000001 HC RX 250 WO HCPCS SELF ADMINISTERED DRUGS (ALT 637 FOR MEDICARE OP): Performed by: EMERGENCY MEDICINE

## 2024-07-26 PROCEDURE — 2500000004 HC RX 250 GENERAL PHARMACY W/ HCPCS (ALT 636 FOR OP/ED): Performed by: INTERNAL MEDICINE

## 2024-07-26 PROCEDURE — 1210000001 HC SEMI-PRIVATE ROOM DAILY

## 2024-07-26 PROCEDURE — 36415 COLL VENOUS BLD VENIPUNCTURE: CPT | Performed by: INTERNAL MEDICINE

## 2024-07-26 PROCEDURE — 85027 COMPLETE CBC AUTOMATED: CPT | Performed by: INTERNAL MEDICINE

## 2024-07-26 PROCEDURE — 2500000004 HC RX 250 GENERAL PHARMACY W/ HCPCS (ALT 636 FOR OP/ED): Performed by: EMERGENCY MEDICINE

## 2024-07-26 PROCEDURE — 74176 CT ABD & PELVIS W/O CONTRAST: CPT | Performed by: RADIOLOGY

## 2024-07-26 PROCEDURE — 1100000001 HC PRIVATE ROOM DAILY

## 2024-07-26 PROCEDURE — 74176 CT ABD & PELVIS W/O CONTRAST: CPT

## 2024-07-26 RX ORDER — SODIUM CHLORIDE 9 MG/ML
50 INJECTION, SOLUTION INTRAVENOUS CONTINUOUS
Status: DISCONTINUED | OUTPATIENT
Start: 2024-07-26 | End: 2024-07-27

## 2024-07-26 RX ORDER — LEVOFLOXACIN 500 MG/1
500 TABLET, FILM COATED ORAL ONCE
Status: COMPLETED | OUTPATIENT
Start: 2024-07-26 | End: 2024-07-26

## 2024-07-26 RX ORDER — PANTOPRAZOLE SODIUM 40 MG/10ML
40 INJECTION, POWDER, LYOPHILIZED, FOR SOLUTION INTRAVENOUS
Status: DISCONTINUED | OUTPATIENT
Start: 2024-07-26 | End: 2024-07-26 | Stop reason: HOSPADM

## 2024-07-26 RX ORDER — ATORVASTATIN CALCIUM 20 MG/1
20 TABLET, FILM COATED ORAL NIGHTLY
Status: DISCONTINUED | OUTPATIENT
Start: 2024-07-26 | End: 2024-07-27 | Stop reason: HOSPADM

## 2024-07-26 RX ORDER — ONDANSETRON HYDROCHLORIDE 2 MG/ML
4 INJECTION, SOLUTION INTRAVENOUS EVERY 4 HOURS PRN
Status: DISCONTINUED | OUTPATIENT
Start: 2024-07-26 | End: 2024-07-27 | Stop reason: HOSPADM

## 2024-07-26 RX ORDER — ONDANSETRON HYDROCHLORIDE 2 MG/ML
4 INJECTION, SOLUTION INTRAVENOUS EVERY 8 HOURS PRN
Status: DISCONTINUED | OUTPATIENT
Start: 2024-07-26 | End: 2024-07-26 | Stop reason: HOSPADM

## 2024-07-26 RX ORDER — NAPROXEN SODIUM 220 MG/1
81 TABLET, FILM COATED ORAL DAILY
Status: DISCONTINUED | OUTPATIENT
Start: 2024-07-26 | End: 2024-07-27 | Stop reason: HOSPADM

## 2024-07-26 RX ORDER — PANTOPRAZOLE SODIUM 40 MG/1
40 TABLET, DELAYED RELEASE ORAL
Status: DISCONTINUED | OUTPATIENT
Start: 2024-07-26 | End: 2024-07-26 | Stop reason: HOSPADM

## 2024-07-26 RX ORDER — CARVEDILOL 3.12 MG/1
6.25 TABLET ORAL 2 TIMES DAILY
Status: DISCONTINUED | OUTPATIENT
Start: 2024-07-26 | End: 2024-07-27 | Stop reason: HOSPADM

## 2024-07-26 RX ORDER — HYDRALAZINE HYDROCHLORIDE 25 MG/1
25 TABLET, FILM COATED ORAL 2 TIMES DAILY
Status: DISCONTINUED | OUTPATIENT
Start: 2024-07-26 | End: 2024-07-27 | Stop reason: HOSPADM

## 2024-07-26 RX ORDER — CHOLECALCIFEROL (VITAMIN D3) 25 MCG
2000 TABLET ORAL DAILY
Status: DISCONTINUED | OUTPATIENT
Start: 2024-07-26 | End: 2024-07-26 | Stop reason: HOSPADM

## 2024-07-26 RX ORDER — ATORVASTATIN CALCIUM 20 MG/1
20 TABLET, FILM COATED ORAL NIGHTLY
Status: DISCONTINUED | OUTPATIENT
Start: 2024-07-26 | End: 2024-07-26 | Stop reason: HOSPADM

## 2024-07-26 RX ORDER — ISOSORBIDE MONONITRATE 30 MG/1
30 TABLET, EXTENDED RELEASE ORAL DAILY
Status: DISCONTINUED | OUTPATIENT
Start: 2024-07-26 | End: 2024-07-27 | Stop reason: HOSPADM

## 2024-07-26 RX ORDER — ACETAMINOPHEN 325 MG/1
650 TABLET ORAL EVERY 4 HOURS PRN
Status: DISCONTINUED | OUTPATIENT
Start: 2024-07-26 | End: 2024-07-26 | Stop reason: HOSPADM

## 2024-07-26 RX ORDER — ONDANSETRON 4 MG/1
4 TABLET, FILM COATED ORAL EVERY 8 HOURS PRN
Status: DISCONTINUED | OUTPATIENT
Start: 2024-07-26 | End: 2024-07-26 | Stop reason: HOSPADM

## 2024-07-26 RX ORDER — ISOSORBIDE MONONITRATE 30 MG/1
30 TABLET, EXTENDED RELEASE ORAL DAILY
Status: DISCONTINUED | OUTPATIENT
Start: 2024-07-26 | End: 2024-07-26 | Stop reason: HOSPADM

## 2024-07-26 RX ORDER — ACETAMINOPHEN 650 MG/1
650 SUPPOSITORY RECTAL EVERY 4 HOURS PRN
Status: DISCONTINUED | OUTPATIENT
Start: 2024-07-26 | End: 2024-07-26 | Stop reason: HOSPADM

## 2024-07-26 RX ORDER — TALC
3 POWDER (GRAM) TOPICAL NIGHTLY PRN
Status: DISCONTINUED | OUTPATIENT
Start: 2024-07-26 | End: 2024-07-27 | Stop reason: HOSPADM

## 2024-07-26 RX ORDER — POLYETHYLENE GLYCOL 3350 17 G/17G
17 POWDER, FOR SOLUTION ORAL DAILY
Status: DISCONTINUED | OUTPATIENT
Start: 2024-07-26 | End: 2024-07-27 | Stop reason: HOSPADM

## 2024-07-26 RX ORDER — HYDRALAZINE HYDROCHLORIDE 25 MG/1
25 TABLET, FILM COATED ORAL 2 TIMES DAILY
Status: DISCONTINUED | OUTPATIENT
Start: 2024-07-26 | End: 2024-07-26 | Stop reason: HOSPADM

## 2024-07-26 RX ORDER — CEFTRIAXONE 1 G/50ML
1 INJECTION, SOLUTION INTRAVENOUS EVERY 24 HOURS
Status: DISCONTINUED | OUTPATIENT
Start: 2024-07-26 | End: 2024-07-26 | Stop reason: HOSPADM

## 2024-07-26 RX ORDER — CARVEDILOL 6.25 MG/1
6.25 TABLET ORAL 2 TIMES DAILY
Status: DISCONTINUED | OUTPATIENT
Start: 2024-07-26 | End: 2024-07-26 | Stop reason: HOSPADM

## 2024-07-26 RX ORDER — ACETAMINOPHEN 325 MG/1
650 TABLET ORAL EVERY 6 HOURS PRN
Status: DISCONTINUED | OUTPATIENT
Start: 2024-07-26 | End: 2024-07-27 | Stop reason: HOSPADM

## 2024-07-26 RX ORDER — SIMETHICONE 80 MG
80 TABLET,CHEWABLE ORAL 4 TIMES DAILY PRN
Status: DISCONTINUED | OUTPATIENT
Start: 2024-07-26 | End: 2024-07-27 | Stop reason: HOSPADM

## 2024-07-26 RX ORDER — DOCUSATE SODIUM 100 MG/1
100 CAPSULE, LIQUID FILLED ORAL 2 TIMES DAILY PRN
Status: DISCONTINUED | OUTPATIENT
Start: 2024-07-26 | End: 2024-07-27 | Stop reason: HOSPADM

## 2024-07-26 RX ORDER — ACETAMINOPHEN 160 MG/5ML
650 SOLUTION ORAL EVERY 4 HOURS PRN
Status: DISCONTINUED | OUTPATIENT
Start: 2024-07-26 | End: 2024-07-26 | Stop reason: HOSPADM

## 2024-07-26 RX ORDER — CEFTRIAXONE 1 G/50ML
1 INJECTION, SOLUTION INTRAVENOUS EVERY 24 HOURS
Status: DISCONTINUED | OUTPATIENT
Start: 2024-07-26 | End: 2024-07-27

## 2024-07-26 RX ORDER — CHOLECALCIFEROL (VITAMIN D3) 25 MCG
2000 TABLET ORAL DAILY
Status: DISCONTINUED | OUTPATIENT
Start: 2024-07-26 | End: 2024-07-27 | Stop reason: HOSPADM

## 2024-07-26 RX ORDER — FUROSEMIDE 40 MG/1
80 TABLET ORAL DAILY
Status: DISCONTINUED | OUTPATIENT
Start: 2024-07-26 | End: 2024-07-27 | Stop reason: HOSPADM

## 2024-07-26 RX ORDER — HEPARIN SODIUM 5000 [USP'U]/ML
5000 INJECTION, SOLUTION INTRAVENOUS; SUBCUTANEOUS EVERY 8 HOURS
Status: DISCONTINUED | OUTPATIENT
Start: 2024-07-26 | End: 2024-07-26 | Stop reason: HOSPADM

## 2024-07-26 RX ADMIN — SODIUM CHLORIDE 50 ML/HR: 900 INJECTION, SOLUTION INTRAVENOUS at 12:03

## 2024-07-26 RX ADMIN — CEFTRIAXONE 1 G: 1 INJECTION, SOLUTION INTRAVENOUS at 12:02

## 2024-07-26 RX ADMIN — HYDRALAZINE HYDROCHLORIDE 25 MG: 25 TABLET, FILM COATED ORAL at 21:40

## 2024-07-26 RX ADMIN — CARVEDILOL 6.25 MG: 3.12 TABLET, FILM COATED ORAL at 21:40

## 2024-07-26 RX ADMIN — ATORVASTATIN CALCIUM 20 MG: 20 TABLET, FILM COATED ORAL at 21:40

## 2024-07-26 SDOH — ECONOMIC STABILITY: HOUSING INSECURITY: AT ANY TIME IN THE PAST 12 MONTHS, WERE YOU HOMELESS OR LIVING IN A SHELTER (INCLUDING NOW)?: NO

## 2024-07-26 SDOH — SOCIAL STABILITY: SOCIAL NETWORK
DO YOU BELONG TO ANY CLUBS OR ORGANIZATIONS SUCH AS CHURCH GROUPS UNIONS, FRATERNAL OR ATHLETIC GROUPS, OR SCHOOL GROUPS?: NO

## 2024-07-26 SDOH — SOCIAL STABILITY: SOCIAL INSECURITY: DO YOU FEEL UNSAFE GOING BACK TO THE PLACE WHERE YOU ARE LIVING?: NO

## 2024-07-26 SDOH — SOCIAL STABILITY: SOCIAL INSECURITY: HAVE YOU HAD THOUGHTS OF HARMING ANYONE ELSE?: NO

## 2024-07-26 SDOH — ECONOMIC STABILITY: INCOME INSECURITY: IN THE LAST 12 MONTHS, WAS THERE A TIME WHEN YOU WERE NOT ABLE TO PAY THE MORTGAGE OR RENT ON TIME?: NO

## 2024-07-26 SDOH — HEALTH STABILITY: MENTAL HEALTH: HOW MANY STANDARD DRINKS CONTAINING ALCOHOL DO YOU HAVE ON A TYPICAL DAY?: 1 OR 2

## 2024-07-26 SDOH — HEALTH STABILITY: MENTAL HEALTH
HOW OFTEN DO YOU NEED TO HAVE SOMEONE HELP YOU WHEN YOU READ INSTRUCTIONS, PAMPHLETS, OR OTHER WRITTEN MATERIAL FROM YOUR DOCTOR OR PHARMACY?: NEVER

## 2024-07-26 SDOH — ECONOMIC STABILITY: INCOME INSECURITY: HOW HARD IS IT FOR YOU TO PAY FOR THE VERY BASICS LIKE FOOD, HOUSING, MEDICAL CARE, AND HEATING?: NOT HARD AT ALL

## 2024-07-26 SDOH — HEALTH STABILITY: MENTAL HEALTH
STRESS IS WHEN SOMEONE FEELS TENSE, NERVOUS, ANXIOUS, OR CAN'T SLEEP AT NIGHT BECAUSE THEIR MIND IS TROUBLED. HOW STRESSED ARE YOU?: NOT AT ALL

## 2024-07-26 SDOH — ECONOMIC STABILITY: FOOD INSECURITY: WITHIN THE PAST 12 MONTHS, THE FOOD YOU BOUGHT JUST DIDN'T LAST AND YOU DIDN'T HAVE MONEY TO GET MORE.: NEVER TRUE

## 2024-07-26 SDOH — SOCIAL STABILITY: SOCIAL NETWORK: ARE YOU MARRIED, WIDOWED, DIVORCED, SEPARATED, NEVER MARRIED, OR LIVING WITH A PARTNER?: MARRIED

## 2024-07-26 SDOH — SOCIAL STABILITY: SOCIAL INSECURITY: DOES ANYONE TRY TO KEEP YOU FROM HAVING/CONTACTING OTHER FRIENDS OR DOING THINGS OUTSIDE YOUR HOME?: NO

## 2024-07-26 SDOH — HEALTH STABILITY: MENTAL HEALTH: HOW OFTEN DO YOU HAVE A DRINK CONTAINING ALCOHOL?: MONTHLY OR LESS

## 2024-07-26 SDOH — SOCIAL STABILITY: SOCIAL INSECURITY: WITHIN THE LAST YEAR, HAVE YOU BEEN AFRAID OF YOUR PARTNER OR EX-PARTNER?: NO

## 2024-07-26 SDOH — HEALTH STABILITY: PHYSICAL HEALTH: ON AVERAGE, HOW MANY MINUTES DO YOU ENGAGE IN EXERCISE AT THIS LEVEL?: 0 MIN

## 2024-07-26 SDOH — SOCIAL STABILITY: SOCIAL INSECURITY
WITHIN THE LAST YEAR, HAVE YOU BEEN KICKED, HIT, SLAPPED, OR OTHERWISE PHYSICALLY HURT BY YOUR PARTNER OR EX-PARTNER?: NO

## 2024-07-26 SDOH — ECONOMIC STABILITY: FOOD INSECURITY: WITHIN THE PAST 12 MONTHS, YOU WORRIED THAT YOUR FOOD WOULD RUN OUT BEFORE YOU GOT MONEY TO BUY MORE.: NEVER TRUE

## 2024-07-26 SDOH — HEALTH STABILITY: MENTAL HEALTH: HOW OFTEN DO YOU HAVE 6 OR MORE DRINKS ON ONE OCCASION?: NEVER

## 2024-07-26 SDOH — SOCIAL STABILITY: SOCIAL INSECURITY: WITHIN THE LAST YEAR, HAVE YOU BEEN HUMILIATED OR EMOTIONALLY ABUSED IN OTHER WAYS BY YOUR PARTNER OR EX-PARTNER?: NO

## 2024-07-26 SDOH — SOCIAL STABILITY: SOCIAL INSECURITY
WITHIN THE LAST YEAR, HAVE TO BEEN RAPED OR FORCED TO HAVE ANY KIND OF SEXUAL ACTIVITY BY YOUR PARTNER OR EX-PARTNER?: NO

## 2024-07-26 SDOH — SOCIAL STABILITY: SOCIAL INSECURITY: HAS ANYONE EVER THREATENED TO HURT YOUR FAMILY OR YOUR PETS?: NO

## 2024-07-26 SDOH — ECONOMIC STABILITY: INCOME INSECURITY: IN THE PAST 12 MONTHS, HAS THE ELECTRIC, GAS, OIL, OR WATER COMPANY THREATENED TO SHUT OFF SERVICE IN YOUR HOME?: NO

## 2024-07-26 SDOH — SOCIAL STABILITY: SOCIAL NETWORK: HOW OFTEN DO YOU ATTENT MEETINGS OF THE CLUB OR ORGANIZATION YOU BELONG TO?: NEVER

## 2024-07-26 SDOH — SOCIAL STABILITY: SOCIAL NETWORK: HOW OFTEN DO YOU GET TOGETHER WITH FRIENDS OR RELATIVES?: ONCE A WEEK

## 2024-07-26 SDOH — HEALTH STABILITY: PHYSICAL HEALTH: ON AVERAGE, HOW MANY DAYS PER WEEK DO YOU ENGAGE IN MODERATE TO STRENUOUS EXERCISE (LIKE A BRISK WALK)?: 0 DAYS

## 2024-07-26 SDOH — ECONOMIC STABILITY: HOUSING INSECURITY: IN THE PAST 12 MONTHS, HOW MANY TIMES HAVE YOU MOVED WHERE YOU WERE LIVING?: 1

## 2024-07-26 SDOH — SOCIAL STABILITY: SOCIAL INSECURITY: HAVE YOU HAD ANY THOUGHTS OF HARMING ANYONE ELSE?: NO

## 2024-07-26 SDOH — SOCIAL STABILITY: SOCIAL INSECURITY: ARE YOU OR HAVE YOU BEEN THREATENED OR ABUSED PHYSICALLY, EMOTIONALLY, OR SEXUALLY BY ANYONE?: NO

## 2024-07-26 SDOH — SOCIAL STABILITY: SOCIAL INSECURITY: ABUSE: ADULT

## 2024-07-26 SDOH — SOCIAL STABILITY: SOCIAL INSECURITY: ARE THERE ANY APPARENT SIGNS OF INJURIES/BEHAVIORS THAT COULD BE RELATED TO ABUSE/NEGLECT?: NO

## 2024-07-26 SDOH — SOCIAL STABILITY: SOCIAL NETWORK: IN A TYPICAL WEEK, HOW MANY TIMES DO YOU TALK ON THE PHONE WITH FAMILY, FRIENDS, OR NEIGHBORS?: ONCE A WEEK

## 2024-07-26 SDOH — SOCIAL STABILITY: SOCIAL INSECURITY: DO YOU FEEL ANYONE HAS EXPLOITED OR TAKEN ADVANTAGE OF YOU FINANCIALLY OR OF YOUR PERSONAL PROPERTY?: NO

## 2024-07-26 SDOH — SOCIAL STABILITY: SOCIAL INSECURITY: WERE YOU ABLE TO COMPLETE ALL THE BEHAVIORAL HEALTH SCREENINGS?: YES

## 2024-07-26 ASSESSMENT — ENCOUNTER SYMPTOMS
CARDIOVASCULAR NEGATIVE: 1
NEUROLOGICAL NEGATIVE: 1
ALLERGIC/IMMUNOLOGIC NEGATIVE: 1
EYES NEGATIVE: 1
HEMATOLOGIC/LYMPHATIC NEGATIVE: 1
PSYCHIATRIC NEGATIVE: 1
NEUROLOGICAL NEGATIVE: 1
GASTROINTESTINAL NEGATIVE: 1
ENDOCRINE NEGATIVE: 1
CARDIOVASCULAR NEGATIVE: 1
EYES NEGATIVE: 1
CONSTITUTIONAL NEGATIVE: 1
GASTROINTESTINAL NEGATIVE: 1
PSYCHIATRIC NEGATIVE: 1
HEMATURIA: 1
CONSTITUTIONAL NEGATIVE: 1
MUSCULOSKELETAL NEGATIVE: 1
RESPIRATORY NEGATIVE: 1
ALLERGIC/IMMUNOLOGIC NEGATIVE: 1
HEMATURIA: 1
RESPIRATORY NEGATIVE: 1
HEMATOLOGIC/LYMPHATIC NEGATIVE: 1
MUSCULOSKELETAL NEGATIVE: 1
ENDOCRINE NEGATIVE: 1

## 2024-07-26 ASSESSMENT — COGNITIVE AND FUNCTIONAL STATUS - GENERAL
TOILETING: A LITTLE
MOBILITY SCORE: 24
DAILY ACTIVITIY SCORE: 23
DAILY ACTIVITIY SCORE: 23
PATIENT BASELINE BEDBOUND: NO
TOILETING: A LITTLE
MOBILITY SCORE: 24

## 2024-07-26 ASSESSMENT — LIFESTYLE VARIABLES
PRESCIPTION_ABUSE_PAST_12_MONTHS: NO
AUDIT-C TOTAL SCORE: 1
SKIP TO QUESTIONS 9-10: 1
HOW OFTEN DO YOU HAVE A DRINK CONTAINING ALCOHOL: MONTHLY OR LESS
SUBSTANCE_ABUSE_PAST_12_MONTHS: NO
SKIP TO QUESTIONS 9-10: 1
AUDIT-C TOTAL SCORE: 1
HOW OFTEN DO YOU HAVE 6 OR MORE DRINKS ON ONE OCCASION: NEVER
AUDIT-C TOTAL SCORE: 1
HOW MANY STANDARD DRINKS CONTAINING ALCOHOL DO YOU HAVE ON A TYPICAL DAY: 1 OR 2

## 2024-07-26 ASSESSMENT — PATIENT HEALTH QUESTIONNAIRE - PHQ9
1. LITTLE INTEREST OR PLEASURE IN DOING THINGS: NOT AT ALL
SUM OF ALL RESPONSES TO PHQ9 QUESTIONS 1 & 2: 0
2. FEELING DOWN, DEPRESSED OR HOPELESS: NOT AT ALL

## 2024-07-26 ASSESSMENT — ACTIVITIES OF DAILY LIVING (ADL)
WALKS IN HOME: INDEPENDENT
GROOMING: INDEPENDENT
ADEQUATE_TO_COMPLETE_ADL: YES
JUDGMENT_ADEQUATE_SAFELY_COMPLETE_DAILY_ACTIVITIES: YES
ASSISTIVE_DEVICE: CANE;DENTURES LOWER;DENTURES UPPER
LACK_OF_TRANSPORTATION: NO
HEARING - RIGHT EAR: FUNCTIONAL
HEARING - LEFT EAR: FUNCTIONAL
BATHING: INDEPENDENT
LACK_OF_TRANSPORTATION: NO
FEEDING YOURSELF: INDEPENDENT
TOILETING: INDEPENDENT
PATIENT'S MEMORY ADEQUATE TO SAFELY COMPLETE DAILY ACTIVITIES?: YES
DRESSING YOURSELF: INDEPENDENT

## 2024-07-26 ASSESSMENT — PAIN SCALES - GENERAL
PAINLEVEL_OUTOF10: 0 - NO PAIN

## 2024-07-26 ASSESSMENT — PAIN - FUNCTIONAL ASSESSMENT: PAIN_FUNCTIONAL_ASSESSMENT: 0-10

## 2024-07-26 NOTE — NURSING NOTE
0610pm Pt's marin continuing to drain light red color urine no clots observed, family at bedside, with call light in reach.

## 2024-07-26 NOTE — ED NOTES
Report called to [facility].  [facility] for transport, awaiting ride.      Dary Bal RN  07/26/24 8645

## 2024-07-26 NOTE — NURSING NOTE
Patient arrived on floor and oriented to room.   Light meal was encouraged while taking vitals.   Placed call light within reach.

## 2024-07-26 NOTE — H&P
History Of Present Illness  Tom Haas is a 79 y.o. male with a past medical history of combined systolic and diastolic CHF, CKD stage IV, mild aortic stenosis hypertension, hyperlipidemia ,CAD by C '05 (Lcx 80-90%, LAD 50-60%, RCA 40%), and carotid stenosis, who presented to [facility] [place] complaining of gross hematuria with urinary retention.  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  He does complain of suprapubic pressure but otherwise no pain denies any back pain fever chills nausea or vomiting.  He takes aspirin but no other anticoagulants.  His nephrologist sent him into the ER.  Bladder scan showed urinary retention 900 mL and Rodriguez catheter was placed with three-way bladder irrigation initiated.  UA was consistent with infection.  Urine was bloody and he received Levaquin     Past Medical History  Past Medical History:   Diagnosis Date    Chronic kidney disease     Heart disease     Hypertension     Occlusion and stenosis of unspecified carotid artery     Carotid atherosclerosis    Other conditions influencing health status     Coronary Artery Disease    Personal history of other diseases of the circulatory system     History of congestive heart disease    Personal history of other diseases of the circulatory system     History of hypertension    Personal history of other endocrine, nutritional and metabolic disease     History of hyperlipidemia        Surgical History  Left heart catheterization     Social History  He reports that he has never smoked. He has never used smokeless tobacco. He reports current alcohol use. He reports that he does not use drugs.    Family History  No family history on file.     Allergies  Patient has no known allergies.    Review of Systems   Constitutional: Negative.    HENT: Negative.     Eyes: Negative.    Respiratory: Negative.     Cardiovascular: Negative.    Gastrointestinal: Negative.         Suprapubic pain and tenderness   Endocrine:  "Negative.    Genitourinary:  Positive for decreased urine volume and hematuria.   Musculoskeletal: Negative.    Skin: Negative.    Allergic/Immunologic: Negative.    Neurological: Negative.    Hematological: Negative.    Psychiatric/Behavioral: Negative.     All other systems reviewed and are negative.       Physical Exam  Vitals and nursing note reviewed.   Constitutional:       Appearance: Normal appearance.   HENT:      Head: Normocephalic.      Right Ear: External ear normal.      Left Ear: External ear normal.      Nose: Nose normal.      Mouth/Throat:      Mouth: Mucous membranes are dry.      Pharynx: Oropharynx is clear.   Eyes:      Extraocular Movements: Extraocular movements intact.      Conjunctiva/sclera: Conjunctivae normal.      Pupils: Pupils are equal, round, and reactive to light.   Cardiovascular:      Rate and Rhythm: Normal rate and regular rhythm.   Pulmonary:      Effort: Pulmonary effort is normal.      Breath sounds: Normal breath sounds.   Abdominal:      General: Abdomen is flat. Bowel sounds are normal.      Palpations: Abdomen is soft.      Comments: Supple pubic tenderness noted   Genitourinary:     Penis: Normal.       Comments: Rodriguez with gross hematuria in the bag  Musculoskeletal:         General: Normal range of motion.      Right lower leg: Edema present.      Left lower leg: Edema present.   Skin:     General: Skin is warm and dry.   Neurological:      General: No focal deficit present.      Mental Status: He is alert. Mental status is at baseline.   Psychiatric:         Mood and Affect: Mood normal.         Behavior: Behavior normal.        Last Recorded Vitals  Blood pressure 125/60, pulse 77, temperature 36.7 °C (98.1 °F), temperature source Temporal, resp. rate 16, height 1.651 m (5' 5\"), weight 74.8 kg (164 lb 14.5 oz), SpO2 100%.    Relevant Results  Meds:  Scheduled medications  aspirin, 81 mg, oral, Daily  atorvastatin, 20 mg, oral, Nightly  carvedilol, 6.25 mg, oral, " BID  cefTRIAXone, 1 g, intravenous, q24h  cholecalciferol, 2,000 Units, oral, Daily  furosemide, 80 mg, oral, Daily  hydrALAZINE, 25 mg, oral, BID  isosorbide mononitrate ER, 30 mg, oral, Daily  polyethylene glycol, 17 g, oral, Daily      Continuous medications  sodium chloride 0.9%, 50 mL/hr, Last Rate: 50 mL/hr (07/26/24 1203)      PRN medications     Current Outpatient Medications   Medication Instructions    aspirin 81 mg, oral, Daily    atorvastatin (LIPITOR) 20 mg, oral, Nightly    carvedilol (COREG) 6.25 mg, oral, 2 times daily    cholecalciferol (VITAMIN D-3) 2,000 Units, oral, Daily    furosemide (LASIX) 80 mg, oral, Daily    hydrALAZINE (APRESOLINE) 25 mg, oral, 2 times daily    isosorbide mononitrate ER (IMDUR) 30 mg, oral, Daily, Do not crush or chew.        Labs:  Results for orders placed or performed during the hospital encounter of 07/25/24 (from the past 24 hour(s))   CBC and Auto Differential   Result Value Ref Range    WBC 11.3 4.4 - 11.3 x10*3/uL    nRBC 0.0 0.0 - 0.0 /100 WBCs    RBC 4.22 (L) 4.50 - 5.90 x10*6/uL    Hemoglobin 12.3 (L) 13.5 - 17.5 g/dL    Hematocrit 38.2 (L) 41.0 - 52.0 %    MCV 91 80 - 100 fL    MCH 29.1 26.0 - 34.0 pg    MCHC 32.2 32.0 - 36.0 g/dL    RDW 15.9 (H) 11.5 - 14.5 %    Platelets 167 150 - 450 x10*3/uL    Neutrophils % 88.1 40.0 - 80.0 %    Immature Granulocytes %, Automated 0.3 0.0 - 0.9 %    Lymphocytes % 5.9 13.0 - 44.0 %    Monocytes % 5.5 2.0 - 10.0 %    Eosinophils % 0.1 0.0 - 6.0 %    Basophils % 0.1 0.0 - 2.0 %    Neutrophils Absolute 9.93 (H) 1.60 - 5.50 x10*3/uL    Immature Granulocytes Absolute, Automated 0.03 0.00 - 0.50 x10*3/uL    Lymphocytes Absolute 0.66 (L) 0.80 - 3.00 x10*3/uL    Monocytes Absolute 0.62 0.05 - 0.80 x10*3/uL    Eosinophils Absolute 0.01 0.00 - 0.40 x10*3/uL    Basophils Absolute 0.01 0.00 - 0.10 x10*3/uL   Comprehensive metabolic panel   Result Value Ref Range    Glucose 133 (H) 74 - 99 mg/dL    Sodium 132 (L) 136 - 145 mmol/L     Potassium 4.6 3.5 - 5.3 mmol/L    Chloride 99 98 - 107 mmol/L    Bicarbonate 15 (L) 21 - 32 mmol/L    Anion Gap 23 (H) 10 - 20 mmol/L    Urea Nitrogen 75 (H) 6 - 23 mg/dL    Creatinine 3.47 (H) 0.50 - 1.30 mg/dL    eGFR 17 (L) >60 mL/min/1.73m*2    Calcium 9.2 8.6 - 10.3 mg/dL    Albumin 4.3 3.4 - 5.0 g/dL    Alkaline Phosphatase 51 33 - 136 U/L    Total Protein 7.1 6.4 - 8.2 g/dL    AST 29 9 - 39 U/L    Bilirubin, Total 1.1 0.0 - 1.2 mg/dL    ALT 25 10 - 52 U/L   Urinalysis with Reflex Culture and Microscopic   Result Value Ref Range    Color, Urine Brown (N) Light-Yellow, Yellow, Dark-Yellow    Appearance, Urine Ex.Turbid (N) Clear    Specific Gravity, Urine 1.010 1.005 - 1.035    pH, Urine 6.0 5.0, 5.5, 6.0, 6.5, 7.0, 7.5, 8.0    Protein, Urine 200 (2+) (A) NEGATIVE, 10 (TRACE), 20 (TRACE) mg/dL    Glucose, Urine Normal Normal mg/dL    Blood, Urine OVER (3+) (A) NEGATIVE    Ketones, Urine NEGATIVE NEGATIVE mg/dL    Bilirubin, Urine NEGATIVE NEGATIVE    Urobilinogen, Urine Normal Normal mg/dL    Nitrite, Urine NEGATIVE NEGATIVE    Leukocyte Esterase, Urine 75 Cori/µL (A) NEGATIVE   Microscopic Only, Urine   Result Value Ref Range    WBC, Urine >50 (A) 1-5, NONE /HPF    RBC, Urine >20 (A) NONE, 1-2, 3-5 /HPF    Squamous Epithelial Cells, Urine 1-9 (SPARSE) Reference range not established. /HPF    Bacteria, Urine 2+ (A) NONE SEEN /HPF   CBC   Result Value Ref Range    WBC 11.0 4.4 - 11.3 x10*3/uL    nRBC 0.0 0.0 - 0.0 /100 WBCs    RBC 3.85 (L) 4.50 - 5.90 x10*6/uL    Hemoglobin 11.0 (L) 13.5 - 17.5 g/dL    Hematocrit 33.8 (L) 41.0 - 52.0 %    MCV 88 80 - 100 fL    MCH 28.6 26.0 - 34.0 pg    MCHC 32.5 32.0 - 36.0 g/dL    RDW 15.9 (H) 11.5 - 14.5 %    Platelets 145 (L) 150 - 450 x10*3/uL   Basic metabolic panel   Result Value Ref Range    Glucose 105 (H) 74 - 99 mg/dL    Sodium 134 (L) 136 - 145 mmol/L    Potassium 4.4 3.5 - 5.3 mmol/L    Chloride 103 98 - 107 mmol/L    Bicarbonate 17 (L) 21 - 32 mmol/L    Anion Gap 18  10 - 20 mmol/L    Urea Nitrogen 72 (H) 6 - 23 mg/dL    Creatinine 3.16 (H) 0.50 - 1.30 mg/dL    eGFR 19 (L) >60 mL/min/1.73m*2    Calcium 8.8 8.6 - 10.3 mg/dL      Imaging:  CT abdomen pelvis wo IV contrast    Result Date: 7/26/2024  Interpreted By:  Sunshine Alvarez, STUDY: CT ABDOMEN PELVIS WO IV CONTRAST;  7/26/2024 2:45 pm   INDICATION: Signs/Symptoms:hematuria.   COMPARISON: None.   ACCESSION NUMBER(S): [id number]   ORDERING CLINICIAN: KERRY KAISER   TECHNIQUE: CT of the abdomen and pelvis was performed without intravenous contrast. Sagittal and coronal reconstructions were generated.   FINDINGS: LOWER CHEST: There is right lower lobe atelectasis or infiltrate. There is a right pleural effusion.   There is a tiny left pleural effusion.   The heart is enlarged. There are coronary artery calcifications. There is a 7 mm left lower lobe nodule.   ABDOMEN:   LIVER: Unremarkable   BILE DUCTS: Unremarkable   GALLBLADDER: There are no obvious gallstones.   PANCREAS: Unremarkable   SPLEEN: Unremarkable   ADRENAL GLANDS: There are no adrenal masses.   KIDNEYS AND URETERS: The right kidney is atrophic. There is right hydronephrosis. There is no right hydroureter. This might be a chronic UPJ obstruction.   The left kidney is not obstructed. There are left renal cortical cysts.       PELVIS:   BLADDER: A Rodriguez catheter is present in the urinary bladder. The bladder wall appears thickened.   REPRODUCTIVE ORGANS: The prostate is visualized.   BOWEL: There is no significant bowel distention.   There are colonic diverticula. There is a suggestion of a normal caliber appendix.   VESSELS: There are atherosclerotic changes of the aorta. The cava is unremarkable.   PERITONEUM/RETROPERITONEUM/LYMPH NODES: There is no free air or free fluid.   There is no significant lymphadenopathy.   BONES AND ABDOMINAL WALL: There are degenerative changes of the spine.   COMPARISON OF FINDINGS: The abdomen and pelvis are similar. The pleural  effusions have largely resolved.       Right lower lobe consolidation/atelectasis. Right pleural effusion.   Enlarged heart. Coronary artery disease.   Hydronephrosis the right kidney with marked cortical atrophy.   Thick walled bladder. This may be due to lack of distention.   MACRO: none   Signed by: Sunshine Alvarez 7/26/2024 3:41 PM Dictation workstation:   [id number]      Assessment/Plan   Acute urinary retention  Plan:  Rodriguez catheter in place  Urology to see    Gross hematuria  Plan:  Three-way bladder irrigation  Urology    Urinary tract infection  Plan:  Follow-up cultures  Ceftriaxone 1 g IV every 24 hours    CKD 4  Plan:  Trend creatinine    Chronic combined systolic and diastolic CHF  Continue Lasix 40 mg orally daily  Coreg 6.25 mg orally twice daily    Hypertension  Lasix 40 mg orally daily  Coreg 6.25 mg orally twice daily  Hydralazine 25 mg twice daily    CAD -LHC '05 (Lcx 80-90%, LAD 50-60%, RCA 40%),  Aspirin 81 mg orally daily which is on hold due to hematuria  Lipitor 20 mg orally daily    Hyperlipidemia  Atorvastatin 20 mg orally    DVT prophylaxis  No Heparin or Lovenox with Hematuria  SCDs    I spent 60 minutes in the professional and overall care of this patient.    Twan Abreu DO

## 2024-07-26 NOTE — NURSING NOTE
0240pm Pt has family at bedside, seen by Dr. Bellamy has orders for CT scan of pelvis, went down awaiting for completion and results.

## 2024-07-26 NOTE — PROGRESS NOTES
Home with wife      07/26/24 3058   Current Planned Discharge Disposition   Current Planned Discharge Disposition Home

## 2024-07-26 NOTE — PROGRESS NOTES
Transitional Care Coordination Progress Note:  Plan per Medical/Surgical team: treatment of cystitis with hematuria with PO & IV ATB, urology consult   Status: Inpatient   Payor source: Pankaj payton ADV  Discharge disposition: Home with wife, transfer to [facility]  Potential Barriers: 3 way marin with irrigation  ADOD: 7/29/2024  BRYAN Fontana RN, BSN Transitional Care Coordinator ED# [phone]      07/26/24 0640   Discharge Planning   Living Arrangements Spouse/significant other   Support Systems Spouse/significant other;Children   Assistance Needed urology work up, ATB plan, marin care   Type of Residence Private residence   Number of Stairs to Enter Residence 2   Number of Stairs Within Residence 10   Home or Post Acute Services None   Expected Discharge Disposition Other Fac  (transfer to [facility])   Does the patient need discharge transport arranged? Yes   RoundTrip coordination needed? Yes   Has discharge transport been arranged? Yes   What day is the transport expected? 07/26/24   Financial Resource Strain   How hard is it for you to pay for the very basics like food, housing, medical care, and heating? Not hard   Housing Stability   In the last 12 months, was there a time when you were not able to pay the mortgage or rent on time? N   In the past 12 months, how many times have you moved where you were living? 1   At any time in the past 12 months, were you homeless or living in a shelter (including now)? N   Transportation Needs   In the past 12 months, has lack of transportation kept you from medical appointments or from getting medications? no   In the past 12 months, has lack of transportation kept you from meetings, work, or from getting things needed for daily living? No

## 2024-07-26 NOTE — PROGRESS NOTES
07/26/24 0640   [facility] Disability Status   Are you deaf or do you have serious difficulty hearing? N   Are you blind or do you have serious difficulty seeing, even when wearing glasses? N   Because of a physical, mental, or emotional condition, do you have serious difficulty concentrating, remembering, or making decisions? (5 years old or older) N   Do you have serious difficulty walking or climbing stairs? N   Do you have serious difficulty dressing or bathing? N   Because of a physical, mental, or emotional condition, do you have serious difficulty doing errands alone such as visiting the doctor? N

## 2024-07-26 NOTE — H&P
"History Of Present Illness  Tom Haas is a 79 y.o. male presenting with hematuria and clots.     Past Medical History  Past Medical History:   Diagnosis Date    Chronic kidney disease     Heart disease     Hypertension     Occlusion and stenosis of unspecified carotid artery     Carotid atherosclerosis    Other conditions influencing health status     Coronary Artery Disease    Personal history of other diseases of the circulatory system     History of congestive heart disease    Personal history of other diseases of the circulatory system     History of hypertension    Personal history of other endocrine, nutritional and metabolic disease     History of hyperlipidemia       Surgical History  No past surgical history on file.     Social History  He reports that he has never smoked. He has never used smokeless tobacco. He reports current alcohol use. He reports that he does not use drugs.    Family History  No family history on file.     Allergies  Patient has no known allergies.    Review of Systems  Noncontributory  Physical Exam  Patient is alert in no acute distress quite cooperative  Lungs are clear  Cardiac is regular rate and rhythm  Abdomen is soft nontender positive bowel sounds there is no hepatosplenomegaly or CVA tenderness appreciated  Extremities without cyanosis clubbing erythema or edema  :  Rodriguez in place with continuous bladder irrigation urine is mila colored no clots appreciated  Last Recorded Vitals  Blood pressure 146/69, pulse 94, temperature 36.4 °C (97.5 °F), temperature source Temporal, resp. rate 18, height 1.651 m (5' 5\"), weight 74.8 kg (164 lb 14.5 oz), SpO2 99%.    Relevant Results  Scheduled medications  aspirin, 81 mg, oral, Daily  atorvastatin, 20 mg, oral, Nightly  carvedilol, 6.25 mg, oral, BID  cefTRIAXone, 1 g, intravenous, q24h  cholecalciferol, 2,000 Units, oral, Daily  furosemide, 80 mg, oral, Daily  hydrALAZINE, 25 mg, oral, BID  isosorbide mononitrate ER, 30 mg, oral, " Daily  polyethylene glycol, 17 g, oral, Daily      Continuous medications  sodium chloride 0.9%, 50 mL/hr, Last Rate: 50 mL/hr (07/26/24 1203)      Results for orders placed or performed during the hospital encounter of 07/25/24 (from the past 24 hour(s))   CBC and Auto Differential   Result Value Ref Range    WBC 11.3 4.4 - 11.3 x10*3/uL    nRBC 0.0 0.0 - 0.0 /100 WBCs    RBC 4.22 (L) 4.50 - 5.90 x10*6/uL    Hemoglobin 12.3 (L) 13.5 - 17.5 g/dL    Hematocrit 38.2 (L) 41.0 - 52.0 %    MCV 91 80 - 100 fL    MCH 29.1 26.0 - 34.0 pg    MCHC 32.2 32.0 - 36.0 g/dL    RDW 15.9 (H) 11.5 - 14.5 %    Platelets 167 150 - 450 x10*3/uL    Neutrophils % 88.1 40.0 - 80.0 %    Immature Granulocytes %, Automated 0.3 0.0 - 0.9 %    Lymphocytes % 5.9 13.0 - 44.0 %    Monocytes % 5.5 2.0 - 10.0 %    Eosinophils % 0.1 0.0 - 6.0 %    Basophils % 0.1 0.0 - 2.0 %    Neutrophils Absolute 9.93 (H) 1.60 - 5.50 x10*3/uL    Immature Granulocytes Absolute, Automated 0.03 0.00 - 0.50 x10*3/uL    Lymphocytes Absolute 0.66 (L) 0.80 - 3.00 x10*3/uL    Monocytes Absolute 0.62 0.05 - 0.80 x10*3/uL    Eosinophils Absolute 0.01 0.00 - 0.40 x10*3/uL    Basophils Absolute 0.01 0.00 - 0.10 x10*3/uL   Comprehensive metabolic panel   Result Value Ref Range    Glucose 133 (H) 74 - 99 mg/dL    Sodium 132 (L) 136 - 145 mmol/L    Potassium 4.6 3.5 - 5.3 mmol/L    Chloride 99 98 - 107 mmol/L    Bicarbonate 15 (L) 21 - 32 mmol/L    Anion Gap 23 (H) 10 - 20 mmol/L    Urea Nitrogen 75 (H) 6 - 23 mg/dL    Creatinine 3.47 (H) 0.50 - 1.30 mg/dL    eGFR 17 (L) >60 mL/min/1.73m*2    Calcium 9.2 8.6 - 10.3 mg/dL    Albumin 4.3 3.4 - 5.0 g/dL    Alkaline Phosphatase 51 33 - 136 U/L    Total Protein 7.1 6.4 - 8.2 g/dL    AST 29 9 - 39 U/L    Bilirubin, Total 1.1 0.0 - 1.2 mg/dL    ALT 25 10 - 52 U/L   Urinalysis with Reflex Culture and Microscopic   Result Value Ref Range    Color, Urine Brown (N) Light-Yellow, Yellow, Dark-Yellow    Appearance, Urine Ex.Turbid (N) Clear     Specific Gravity, Urine 1.010 1.005 - 1.035    pH, Urine 6.0 5.0, 5.5, 6.0, 6.5, 7.0, 7.5, 8.0    Protein, Urine 200 (2+) (A) NEGATIVE, 10 (TRACE), 20 (TRACE) mg/dL    Glucose, Urine Normal Normal mg/dL    Blood, Urine OVER (3+) (A) NEGATIVE    Ketones, Urine NEGATIVE NEGATIVE mg/dL    Bilirubin, Urine NEGATIVE NEGATIVE    Urobilinogen, Urine Normal Normal mg/dL    Nitrite, Urine NEGATIVE NEGATIVE    Leukocyte Esterase, Urine 75 Cori/µL (A) NEGATIVE   Microscopic Only, Urine   Result Value Ref Range    WBC, Urine >50 (A) 1-5, NONE /HPF    RBC, Urine >20 (A) NONE, 1-2, 3-5 /HPF    Squamous Epithelial Cells, Urine 1-9 (SPARSE) Reference range not established. /HPF    Bacteria, Urine 2+ (A) NONE SEEN /HPF   CBC   Result Value Ref Range    WBC 11.0 4.4 - 11.3 x10*3/uL    nRBC 0.0 0.0 - 0.0 /100 WBCs    RBC 3.85 (L) 4.50 - 5.90 x10*6/uL    Hemoglobin 11.0 (L) 13.5 - 17.5 g/dL    Hematocrit 33.8 (L) 41.0 - 52.0 %    MCV 88 80 - 100 fL    MCH 28.6 26.0 - 34.0 pg    MCHC 32.5 32.0 - 36.0 g/dL    RDW 15.9 (H) 11.5 - 14.5 %    Platelets 145 (L) 150 - 450 x10*3/uL   Basic metabolic panel   Result Value Ref Range    Glucose 105 (H) 74 - 99 mg/dL    Sodium 134 (L) 136 - 145 mmol/L    Potassium 4.4 3.5 - 5.3 mmol/L    Chloride 103 98 - 107 mmol/L    Bicarbonate 17 (L) 21 - 32 mmol/L    Anion Gap 18 10 - 20 mmol/L    Urea Nitrogen 72 (H) 6 - 23 mg/dL    Creatinine 3.16 (H) 0.50 - 1.30 mg/dL    eGFR 19 (L) >60 mL/min/1.73m*2    Calcium 8.8 8.6 - 10.3 mg/dL                  Assessment/Plan   Principal Problem:    Hematuria, unspecified type    Acute urinary retention  -Rodriguez catheter in place  -Rust colored urine no clots appreciated    Gross hematuria  -Continuous bladder irrigation  -CT of the abdomen and pelvis  -Urology consult    Urinary tract infection  -Culture results pending  -Ceftriaxone every 24    Coronary artery disease  -Currently stable  -Continue to monitor    CKD 4  -Continue to monitor    DVT  prophylaxis  -SCD  -Ambulate  -Will hold off on pharmacologic prophylaxis until bleeding is stopped           I spent 50 minutes in the professional and overall care of this patient.      Kelsea Gurrola MD

## 2024-07-26 NOTE — PROGRESS NOTES
Pharmacy Medication History Review    Tom Haas is a 79 y.o. male admitted for Acute cystitis with hematuria. Pharmacy reviewed the patient's [id number] medications and allergies for accuracy.    The list below reflectives the updated PTA list. Please review each medication in order reconciliation for additional clarification and justification.  Prior to Admission Medications   Prescriptions Last Dose Informant   aspirin 81 mg chewable tablet 7/25/2024 at am Self   Sig: Chew 1 tablet (81 mg) once daily. Do not start before March 14, 2024.   atorvastatin (Lipitor) 20 mg tablet 7/24/2024 Self   Sig: Take 1 tablet (20 mg) by mouth once daily at bedtime.   carvedilol (Coreg) 6.25 mg tablet 7/25/2024 at am Self   Sig: Take 1 tablet (6.25 mg) by mouth 2 times a day.   cholecalciferol (Vitamin D-3) 50 MCG (2000 UT) tablet 7/25/2024 at am Self   Sig: Take 1 tablet (2,000 Units) by mouth once daily. Do not start before March 14, 2024.   furosemide (Lasix) 80 mg tablet 7/25/2024 at am Self   Sig: Take 1 tablet (80 mg) by mouth once daily.   Patient taking differently: Take 1 tablet (80 mg) by mouth once daily. Taking 40 mg daily   hydrALAZINE (Apresoline) 25 mg tablet 7/25/2024 at am Self   Sig: Take 1 tablet (25 mg) by mouth 2 times a day.   isosorbide mononitrate ER (Imdur) 30 mg 24 hr tablet 7/25/2024 at am Self   Sig: Take 1 tablet (30 mg) by mouth once daily. Do not crush or chew.      Facility-Administered Medications: None         The list below reflectives the updated allergy list. Please review each documented allergy for additional clarification and justification.  Allergies  Reviewed by Rigoberto Bartholomew on 7/26/2024   No Known Allergies         Below are additional concerns with the patient's PTA list.  Spoke to patient with family at bedside    Rigoberto Bartholomew

## 2024-07-26 NOTE — ED PROVIDER NOTES
HPI   Chief Complaint   Patient presents with   • Blood in Urine       This is a 79-year-old man with past medical history of CAD, hypertension, lipidemia, heart failure does present with complaint of hematuria and blood clots noted in his urine.  Does feel like is not emptying his bladder completely.  Denies any fever or chills.  Does have associated nausea but no vomiting.  No diarrhea.            Patient History   Past Medical History:   Diagnosis Date   • Chronic kidney disease    • Heart disease    • Hypertension    • Occlusion and stenosis of unspecified carotid artery     Carotid atherosclerosis   • Other conditions influencing health status     Coronary Artery Disease   • Personal history of other diseases of the circulatory system     History of congestive heart disease   • Personal history of other diseases of the circulatory system     History of hypertension   • Personal history of other endocrine, nutritional and metabolic disease     History of hyperlipidemia     No past surgical history on file.  No family history on file.  Social History     Tobacco Use   • Smoking status: Never   • Smokeless tobacco: Never   Vaping Use   • Vaping status: Never Used   Substance Use Topics   • Alcohol use: Yes     Comment: on rare occasion   • Drug use: Never       Physical Exam   ED Triage Vitals   Temperature Heart Rate Respirations BP   07/25/24 1805 07/25/24 1805 07/25/24 1805 07/25/24 1807   36.5 °C (97.7 °F) 96 20 (!) 175/100      Pulse Ox Temp src Heart Rate Source Patient Position   07/25/24 1805 -- 07/25/24 2007 07/25/24 2007   99 %  Monitor Sitting      BP Location FiO2 (%)     07/25/24 2007 --     Right arm        Physical Exam  Vitals and nursing note reviewed.   Constitutional:       Appearance: Normal appearance. He is normal weight.   HENT:      Head: Normocephalic and atraumatic.      Right Ear: Tympanic membrane normal.      Left Ear: Tympanic membrane normal.      Nose: Nose normal.       Mouth/Throat:      Mouth: Mucous membranes are moist.      Pharynx: Oropharynx is clear.   Eyes:      Extraocular Movements: Extraocular movements intact.      Conjunctiva/sclera: Conjunctivae normal.      Pupils: Pupils are equal, round, and reactive to light.   Cardiovascular:      Rate and Rhythm: Normal rate and regular rhythm.      Pulses: Normal pulses.      Heart sounds: Normal heart sounds.   Pulmonary:      Effort: Pulmonary effort is normal.      Breath sounds: Normal breath sounds.   Abdominal:      General: Abdomen is flat. Bowel sounds are normal. There is no distension.      Tenderness: There is abdominal tenderness. There is no right CVA tenderness, left CVA tenderness, guarding or rebound.      Comments: Tenderness in the suprapubic   Musculoskeletal:         General: Normal range of motion.      Cervical back: Normal range of motion and neck supple.   Skin:     General: Skin is warm and dry.      Capillary Refill: Capillary refill takes less than 2 seconds.   Neurological:      General: No focal deficit present.      Mental Status: He is alert and oriented to person, place, and time. Mental status is at baseline.      Sensory: No sensory deficit.      Motor: No weakness.   Psychiatric:         Mood and Affect: Mood normal.         Behavior: Behavior normal.         Thought Content: Thought content normal.         Judgment: Judgment normal.           ED Course & MDM   ED Course as of 07/27/24 1748 Fri Jul 26, 2024   0638 Pt willing to transfer to [facility] due to no beds available at [facility].  Awaiting call from Dr. Bazan his nephrologist to see if ok to transfer or if need to keep here at [facility].   [JG]   6070 Dr. Bazan approved [place] [facility]  [place]. Gurrola accepted.  [JG]      ED Course User Index  [JG] Genet Talley MD         Diagnoses as of 07/27/24 1748   Acute cystitis with hematuria   Urinary retention                       Tracy Coma Scale Score: 15                         Medical Decision Making  IV is placed and labs drawn including CBC, CMP, UA.  UA noted infection.  Nursing bladder scan showed 900 cc and Rodriguez catheter was placed with three-way and bladder irrigation was initiated for noted blood on my bedside ultrasound.  Following irrigation his urine remained bloody.  His UA was noted positive for infection has received Levaquin.  Given lack of clearance of hematuria will be admitted for continued CBI and urology consult    Amount and/or Complexity of Data Reviewed  Labs: ordered. Decision-making details documented in ED Course.        Procedure  Procedures     Mahad Saeed MD  07/26/24 0154       Mahad Saeed MD  07/27/24 5799

## 2024-07-26 NOTE — CONSULTS
Consults    Pt on vacation in [facility] and develeped gross hematuria and then issues voiding w discomfort, drove 6+ hours to get home, becoming more uncomfortable   Presented to [facility], 3 way placed, irrigation in place, has been clearing to brown in color, has solitary kidney by report, going down now for CT    Active Ambulatory Problems     Diagnosis Date Noted    Non-STEMI (non-ST elevated myocardial infarction) (Multi) 03/10/2024    Hypertensive urgency 03/10/2024    Pneumonia due to infectious organism 03/10/2024    CHF (congestive heart failure), NYHA class I, acute on chronic, combined (Multi) 03/11/2024    Chronic kidney disease (CKD), stage IV (severe) (Multi) 03/21/2024    Hypertension secondary to other renal disorders 03/21/2024    Pure hypercholesterolemia 03/21/2024    Overweight with body mass index (BMI) of 27 to 27.9 in adult 03/21/2024    HTN (hypertension), benign 05/13/2024    Nonrheumatic aortic valve stenosis 05/13/2024    Coronary artery disease involving native coronary artery of native heart without angina pectoris 05/13/2024    HFrEF (heart failure with reduced ejection fraction) (Multi) 05/13/2024    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, with heart failure (Multi) 05/13/2024    Acute cystitis with hematuria 07/26/2024     Resolved Ambulatory Problems     Diagnosis Date Noted    Acute heart failure (Multi) 03/10/2024    Chronic renal impairment 03/10/2024     Past Medical History:   Diagnosis Date    Chronic kidney disease     Heart disease     Hypertension     Occlusion and stenosis of unspecified carotid artery     Other conditions influencing health status     Personal history of other diseases of the circulatory system     Personal history of other diseases of the circulatory system     Personal history of other endocrine, nutritional and metabolic disease       No past surgical history on file.     Current Facility-Administered Medications on File Prior to Encounter   Medication Dose  Route Frequency Provider Last Rate Last Admin    [COMPLETED] levoFLOXacin (Levaquin) tablet 500 mg  500 mg oral Once Mahad Saeed MD   500 mg at 07/26/24 0316    [COMPLETED] lidocaine 2 % mucosal jelly (Uro-Jet) 1 Application  1 Application urethral Once Mahad Saeed MD   1 Application at 07/25/24 2235    [DISCONTINUED] acetaminophen (Tylenol) oral liquid 650 mg  650 mg oral q4h PRN Twan  Salomone, DO        [DISCONTINUED] acetaminophen (Tylenol) suppository 650 mg  650 mg rectal q4h PRN Twan G Salomone, DO        [DISCONTINUED] acetaminophen (Tylenol) tablet 650 mg  650 mg oral q4h PRN Twan  Salomone, DO        [DISCONTINUED] atorvastatin (Lipitor) tablet 20 mg  20 mg oral Nightly Twan  Salomone, DO        [DISCONTINUED] carvedilol (Coreg) tablet 6.25 mg  6.25 mg oral BID [facility]   6.25 mg at 07/26/24 0958    [DISCONTINUED] cefTRIAXone (Rocephin) 1 g in dextrose (iso) IV 50 mL  1 g intravenous q24h Twan  Salomone, DO        [DISCONTINUED] cholecalciferol (Vitamin D-3) tablet 2,000 Units  2,000 Units oral Daily [facility], DO   2,000 Units at 07/26/24 1006    [DISCONTINUED] heparin (porcine) injection 5,000 Units  5,000 Units subcutaneous q8h [facility], DO   5,000 Units at 07/26/24 0800    [DISCONTINUED] hydrALAZINE (Apresoline) tablet 25 mg  25 mg oral BID Twan  Salomone, DO   25 mg at 07/26/24 0958    [DISCONTINUED] isosorbide mononitrate ER (Imdur) 24 hr tablet 30 mg  30 mg oral Daily Twan  Salomone, DO   30 mg at 07/26/24 1012    [DISCONTINUED] ondansetron (Zofran) injection 4 mg  4 mg intravenous q8h PRN Twan  Salomone, DO        [DISCONTINUED] ondansetron (Zofran) tablet 4 mg  4 mg oral q8h PRN Twan  Salomone, DO        [DISCONTINUED] pantoprazole (ProtoNix) EC tablet 40 mg  40 mg oral Daily before breakfast Twan  Salomone, DO   40 mg at 07/26/24 0958    [DISCONTINUED] pantoprazole (ProtoNix) injection 40 mg  40 mg intravenous Daily before breakfast Twan RAIN  Brady,         [DISCONTINUED] perflutren lipid microspheres (Definity) injection 0.5-10 mL of dilution  0.5-10 mL of dilution intravenous Once in imaging Twan G DO Brady        [DISCONTINUED] perflutren protein A microsphere (Optison) injection 0.5 mL  0.5 mL intravenous Once in imaging Twan G DO Brady        [DISCONTINUED] sulfur hexafluoride microsphr (Lumason) injection 24.28 mg  2 mL intravenous Once in imaging Twan BRISEYDA Abreu DO         Current Outpatient Medications on File Prior to Encounter   Medication Sig Dispense Refill    aspirin 81 mg chewable tablet Chew 1 tablet (81 mg) once daily. Do not start before March 14, 2024. 30 tablet 1    atorvastatin (Lipitor) 20 mg tablet Take 1 tablet (20 mg) by mouth once daily at bedtime. 90 tablet 3    carvedilol (Coreg) 6.25 mg tablet Take 1 tablet (6.25 mg) by mouth 2 times a day. 180 tablet 3    cholecalciferol (Vitamin D-3) 50 MCG (2000 UT) tablet Take 1 tablet (2,000 Units) by mouth once daily. Do not start before March 14, 2024. 30 tablet 1    furosemide (Lasix) 80 mg tablet Take 1 tablet (80 mg) by mouth once daily. (Patient taking differently: Take 1 tablet (80 mg) by mouth once daily. Taking 40 mg daily) 90 tablet 1    hydrALAZINE (Apresoline) 25 mg tablet Take 1 tablet (25 mg) by mouth 2 times a day. 180 tablet 3    isosorbide mononitrate ER (Imdur) 30 mg 24 hr tablet Take 1 tablet (30 mg) by mouth once daily. Do not crush or chew. 90 tablet 3        No Known Allergies     PE:  awake, alert, moderately mobile  No sob nor wheezes  No jaundice nor rash  Has left knee support in place, 3 way marin w brown urine, minimal drip  Able to support weight and transfer    Results for orders placed or performed during the hospital encounter of 07/25/24 (from the past 96 hour(s))   CBC and Auto Differential   Result Value Ref Range    WBC 11.3 4.4 - 11.3 x10*3/uL    nRBC 0.0 0.0 - 0.0 /100 WBCs    RBC 4.22 (L) 4.50 - 5.90 x10*6/uL    Hemoglobin 12.3 (L) 13.5  - 17.5 g/dL    Hematocrit 38.2 (L) 41.0 - 52.0 %    MCV 91 80 - 100 fL    MCH 29.1 26.0 - 34.0 pg    MCHC 32.2 32.0 - 36.0 g/dL    RDW 15.9 (H) 11.5 - 14.5 %    Platelets 167 150 - 450 x10*3/uL    Neutrophils % 88.1 40.0 - 80.0 %    Immature Granulocytes %, Automated 0.3 0.0 - 0.9 %    Lymphocytes % 5.9 13.0 - 44.0 %    Monocytes % 5.5 2.0 - 10.0 %    Eosinophils % 0.1 0.0 - 6.0 %    Basophils % 0.1 0.0 - 2.0 %    Neutrophils Absolute 9.93 (H) 1.60 - 5.50 x10*3/uL    Immature Granulocytes Absolute, Automated 0.03 0.00 - 0.50 x10*3/uL    Lymphocytes Absolute 0.66 (L) 0.80 - 3.00 x10*3/uL    Monocytes Absolute 0.62 0.05 - 0.80 x10*3/uL    Eosinophils Absolute 0.01 0.00 - 0.40 x10*3/uL    Basophils Absolute 0.01 0.00 - 0.10 x10*3/uL   Comprehensive metabolic panel   Result Value Ref Range    Glucose 133 (H) 74 - 99 mg/dL    Sodium 132 (L) 136 - 145 mmol/L    Potassium 4.6 3.5 - 5.3 mmol/L    Chloride 99 98 - 107 mmol/L    Bicarbonate 15 (L) 21 - 32 mmol/L    Anion Gap 23 (H) 10 - 20 mmol/L    Urea Nitrogen 75 (H) 6 - 23 mg/dL    Creatinine 3.47 (H) 0.50 - 1.30 mg/dL    eGFR 17 (L) >60 mL/min/1.73m*2    Calcium 9.2 8.6 - 10.3 mg/dL    Albumin 4.3 3.4 - 5.0 g/dL    Alkaline Phosphatase 51 33 - 136 U/L    Total Protein 7.1 6.4 - 8.2 g/dL    AST 29 9 - 39 U/L    Bilirubin, Total 1.1 0.0 - 1.2 mg/dL    ALT 25 10 - 52 U/L   Urinalysis with Reflex Culture and Microscopic   Result Value Ref Range    Color, Urine Brown (N) Light-Yellow, Yellow, Dark-Yellow    Appearance, Urine Ex.Turbid (N) Clear    Specific Gravity, Urine 1.010 1.005 - 1.035    pH, Urine 6.0 5.0, 5.5, 6.0, 6.5, 7.0, 7.5, 8.0    Protein, Urine 200 (2+) (A) NEGATIVE, 10 (TRACE), 20 (TRACE) mg/dL    Glucose, Urine Normal Normal mg/dL    Blood, Urine OVER (3+) (A) NEGATIVE    Ketones, Urine NEGATIVE NEGATIVE mg/dL    Bilirubin, Urine NEGATIVE NEGATIVE    Urobilinogen, Urine Normal Normal mg/dL    Nitrite, Urine NEGATIVE NEGATIVE    Leukocyte Esterase, Urine 75  Cori/µL (A) NEGATIVE   Microscopic Only, Urine   Result Value Ref Range    WBC, Urine >50 (A) 1-5, NONE /HPF    RBC, Urine >20 (A) NONE, 1-2, 3-5 /HPF    Squamous Epithelial Cells, Urine 1-9 (SPARSE) Reference range not established. /HPF    Bacteria, Urine 2+ (A) NONE SEEN /HPF   CBC   Result Value Ref Range    WBC 11.0 4.4 - 11.3 x10*3/uL    nRBC 0.0 0.0 - 0.0 /100 WBCs    RBC 3.85 (L) 4.50 - 5.90 x10*6/uL    Hemoglobin 11.0 (L) 13.5 - 17.5 g/dL    Hematocrit 33.8 (L) 41.0 - 52.0 %    MCV 88 80 - 100 fL    MCH 28.6 26.0 - 34.0 pg    MCHC 32.5 32.0 - 36.0 g/dL    RDW 15.9 (H) 11.5 - 14.5 %    Platelets 145 (L) 150 - 450 x10*3/uL   Basic metabolic panel   Result Value Ref Range    Glucose 105 (H) 74 - 99 mg/dL    Sodium 134 (L) 136 - 145 mmol/L    Potassium 4.4 3.5 - 5.3 mmol/L    Chloride 103 98 - 107 mmol/L    Bicarbonate 17 (L) 21 - 32 mmol/L    Anion Gap 18 10 - 20 mmol/L    Urea Nitrogen 72 (H) 6 - 23 mg/dL    Creatinine 3.16 (H) 0.50 - 1.30 mg/dL    eGFR 19 (L) >60 mL/min/1.73m*2    Calcium 8.8 8.6 - 10.3 mg/dL   No results found.      A/P:  gross hematuria on Plavix and also renal insufficiency.  The Ct will be the crux of next steps, in meantime marin to irrigation as work up pursued

## 2024-07-26 NOTE — PROGRESS NOTES
07/26/24 1051   Discharge Planning   Living Arrangements Spouse/significant other   Support Systems Spouse/significant other   Assistance Needed Home with no skilled needs   Type of Residence Private residence   Number of Stairs to Enter Residence 2   Number of Stairs Within Residence 10   Do you have animals or pets at home? No   Home or Post Acute Services None   Expected Discharge Disposition Home   Does the patient need discharge transport arranged? No   RoundTrip coordination needed? No   Has discharge transport been arranged? No   Financial Resource Strain   How hard is it for you to pay for the very basics like food, housing, medical care, and heating? Not hard   Housing Stability   In the last 12 months, was there a time when you were not able to pay the mortgage or rent on time? N   In the past 12 months, how many times have you moved where you were living? 1   At any time in the past 12 months, were you homeless or living in a shelter (including now)? N   Transportation Needs   In the past 12 months, has lack of transportation kept you from medical appointments or from getting medications? no   In the past 12 months, has lack of transportation kept you from meetings, work, or from getting things needed for daily living? No

## 2024-07-26 NOTE — NURSING NOTE
1045am Pt arrived to [facility] via squad from [facility] ED to room 202. Pt and family educated to room and surroundings with safety instructions with call light use. Pt has 3 way marin cath intact draining light red pinkish color urine, with CBI. Pt has call light in reach with family at bedside.

## 2024-07-26 NOTE — H&P
History Of Present Illness  Tom Haas is a 79 y.o. male with a past medical history of combined systolic and diastolic CHF, CKD stage IV, mild aortic stenosis hypertension, hyperlipidemia ,CAD by C '05 (Lcx 80-90%, LAD 50-60%, RCA 40%), and carotid stenosis, who presented to [facility] complaining of gross hematuria with urinary retention.  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  He does complain of suprapubic pressure but otherwise no pain denies any back pain fever chills nausea or vomiting.  He takes aspirin but no other anticoagulants.  His nephrologist sent him into the ER.  Bladder scan showed urinary retention 900 mL and Rodriguez catheter was placed with three-way bladder irrigation initiated.  UA was consistent with infection.  Urine was bloody and he received Levaquin     Past Medical History  Past Medical History:   Diagnosis Date    Chronic kidney disease     Heart disease     Hypertension     Occlusion and stenosis of unspecified carotid artery     Carotid atherosclerosis    Other conditions influencing health status     Coronary Artery Disease    Personal history of other diseases of the circulatory system     History of congestive heart disease    Personal history of other diseases of the circulatory system     History of hypertension    Personal history of other endocrine, nutritional and metabolic disease     History of hyperlipidemia        Surgical History  Left heart catheterization     Social History  He reports that he has never smoked. He has never used smokeless tobacco. He reports current alcohol use. He reports that he does not use drugs.    Family History  No family history on file.     Allergies  Patient has no known allergies.    Review of Systems   Constitutional: Negative.    HENT: Negative.     Eyes: Negative.    Respiratory: Negative.     Cardiovascular: Negative.    Gastrointestinal: Negative.         Suprapubic pain and tenderness   Endocrine:  "Negative.    Genitourinary:  Positive for decreased urine volume and hematuria.   Musculoskeletal: Negative.    Skin: Negative.    Allergic/Immunologic: Negative.    Neurological: Negative.    Hematological: Negative.    Psychiatric/Behavioral: Negative.     All other systems reviewed and are negative.       Physical Exam  Vitals and nursing note reviewed.   Constitutional:       Appearance: Normal appearance.   HENT:      Head: Normocephalic.      Right Ear: External ear normal.      Left Ear: External ear normal.      Nose: Nose normal.      Mouth/Throat:      Mouth: Mucous membranes are dry.      Pharynx: Oropharynx is clear.   Eyes:      Extraocular Movements: Extraocular movements intact.      Conjunctiva/sclera: Conjunctivae normal.      Pupils: Pupils are equal, round, and reactive to light.   Cardiovascular:      Rate and Rhythm: Normal rate and regular rhythm.   Pulmonary:      Effort: Pulmonary effort is normal.      Breath sounds: Normal breath sounds.   Abdominal:      General: Abdomen is flat. Bowel sounds are normal.      Palpations: Abdomen is soft.      Comments: Supple pubic tenderness noted   Genitourinary:     Penis: Normal.       Comments: Rodriguez with gross hematuria in the bag  Musculoskeletal:         General: Normal range of motion.      Right lower leg: Edema present.      Left lower leg: Edema present.   Skin:     General: Skin is warm and dry.   Neurological:      General: No focal deficit present.      Mental Status: He is alert. Mental status is at baseline.   Psychiatric:         Mood and Affect: Mood normal.         Behavior: Behavior normal.      Last Recorded Vitals  Blood pressure (!) 154/108, pulse 89, temperature 36.5 °C (97.7 °F), resp. rate 17, height 1.651 m (5' 5\"), weight 74.8 kg (165 lb), SpO2 96%.    Relevant Results  Meds:  Scheduled medications  atorvastatin, 20 mg, oral, Nightly  carvedilol, 6.25 mg, oral, BID  cefTRIAXone, 1 g, intravenous, q24h  cholecalciferol, 2,000 " Units, oral, Daily  hydrALAZINE, 25 mg, oral, BID  isosorbide mononitrate ER, 30 mg, oral, Daily  perflutren lipid microspheres, 0.5-10 mL of dilution, intravenous, Once in imaging  perflutren protein A microsphere, 0.5 mL, intravenous, Once in imaging  sulfur hexafluoride microsphr, 2 mL, intravenous, Once in imaging      Continuous medications     PRN medications     Current Outpatient Medications   Medication Instructions    aspirin 81 mg, oral, Daily    atorvastatin (LIPITOR) 20 mg, oral, Nightly    carvedilol (COREG) 6.25 mg, oral, 2 times daily    cholecalciferol (VITAMIN D-3) 2,000 Units, oral, Daily    furosemide (LASIX) 80 mg, oral, Daily    hydrALAZINE (APRESOLINE) 25 mg, oral, 2 times daily    isosorbide mononitrate ER (IMDUR) 30 mg, oral, Daily, Do not crush or chew.        Labs:  Results for orders placed or performed during the hospital encounter of 07/25/24 (from the past 24 hour(s))   CBC and Auto Differential   Result Value Ref Range    WBC 11.3 4.4 - 11.3 x10*3/uL    nRBC 0.0 0.0 - 0.0 /100 WBCs    RBC 4.22 (L) 4.50 - 5.90 x10*6/uL    Hemoglobin 12.3 (L) 13.5 - 17.5 g/dL    Hematocrit 38.2 (L) 41.0 - 52.0 %    MCV 91 80 - 100 fL    MCH 29.1 26.0 - 34.0 pg    MCHC 32.2 32.0 - 36.0 g/dL    RDW 15.9 (H) 11.5 - 14.5 %    Platelets 167 150 - 450 x10*3/uL    Neutrophils % 88.1 40.0 - 80.0 %    Immature Granulocytes %, Automated 0.3 0.0 - 0.9 %    Lymphocytes % 5.9 13.0 - 44.0 %    Monocytes % 5.5 2.0 - 10.0 %    Eosinophils % 0.1 0.0 - 6.0 %    Basophils % 0.1 0.0 - 2.0 %    Neutrophils Absolute 9.93 (H) 1.60 - 5.50 x10*3/uL    Immature Granulocytes Absolute, Automated 0.03 0.00 - 0.50 x10*3/uL    Lymphocytes Absolute 0.66 (L) 0.80 - 3.00 x10*3/uL    Monocytes Absolute 0.62 0.05 - 0.80 x10*3/uL    Eosinophils Absolute 0.01 0.00 - 0.40 x10*3/uL    Basophils Absolute 0.01 0.00 - 0.10 x10*3/uL   Comprehensive metabolic panel   Result Value Ref Range    Glucose 133 (H) 74 - 99 mg/dL    Sodium 132 (L) 136 -  145 mmol/L    Potassium 4.6 3.5 - 5.3 mmol/L    Chloride 99 98 - 107 mmol/L    Bicarbonate 15 (L) 21 - 32 mmol/L    Anion Gap 23 (H) 10 - 20 mmol/L    Urea Nitrogen 75 (H) 6 - 23 mg/dL    Creatinine 3.47 (H) 0.50 - 1.30 mg/dL    eGFR 17 (L) >60 mL/min/1.73m*2    Calcium 9.2 8.6 - 10.3 mg/dL    Albumin 4.3 3.4 - 5.0 g/dL    Alkaline Phosphatase 51 33 - 136 U/L    Total Protein 7.1 6.4 - 8.2 g/dL    AST 29 9 - 39 U/L    Bilirubin, Total 1.1 0.0 - 1.2 mg/dL    ALT 25 10 - 52 U/L   Urinalysis with Reflex Culture and Microscopic   Result Value Ref Range    Color, Urine Brown (N) Light-Yellow, Yellow, Dark-Yellow    Appearance, Urine Ex.Turbid (N) Clear    Specific Gravity, Urine 1.010 1.005 - 1.035    pH, Urine 6.0 5.0, 5.5, 6.0, 6.5, 7.0, 7.5, 8.0    Protein, Urine 200 (2+) (A) NEGATIVE, 10 (TRACE), 20 (TRACE) mg/dL    Glucose, Urine Normal Normal mg/dL    Blood, Urine OVER (3+) (A) NEGATIVE    Ketones, Urine NEGATIVE NEGATIVE mg/dL    Bilirubin, Urine NEGATIVE NEGATIVE    Urobilinogen, Urine Normal Normal mg/dL    Nitrite, Urine NEGATIVE NEGATIVE    Leukocyte Esterase, Urine 75 Cori/µL (A) NEGATIVE   Microscopic Only, Urine   Result Value Ref Range    WBC, Urine >50 (A) 1-5, NONE /HPF    RBC, Urine >20 (A) NONE, 1-2, 3-5 /HPF    Squamous Epithelial Cells, Urine 1-9 (SPARSE) Reference range not established. /HPF    Bacteria, Urine 2+ (A) NONE SEEN /HPF      Imaging:  No results found.     Assessment/Plan   Acute urinary retention  Plan:  Rodriguez catheter in place  Urology to see    Gross hematuria  Plan:  Three-way bladder irrigation  Urology    Urinary tract infection  Plan:  Follow-up cultures  Ceftriaxone 1 g IV every 24 hours    CKD 4  Plan:  Trend creatinine    Chronic combined systolic and diastolic CHF  Continue Lasix 40 mg orally daily  Coreg 6.25 mg orally twice daily    Hypertension  Lasix 40 mg orally daily  Coreg 6.25 mg orally twice daily  Hydralazine 25 mg twice daily    CAD -C '05 (Lcx 80-90%, LAD 50-60%,  RCA 40%),  Aspirin 81 mg orally daily which is on hold due to hematuria  Lipitor 20 mg orally daily    Hyperlipidemia  Atorvastatin 20 mg orally    DVT prophylaxis  Heparin 5000 units subcutaneously every 8 hours  SCDs    I spent 60 minutes in the professional and overall care of this patient.    Twan Abreu DO

## 2024-07-27 VITALS
SYSTOLIC BLOOD PRESSURE: 142 MMHG | BODY MASS INDEX: 27.47 KG/M2 | DIASTOLIC BLOOD PRESSURE: 61 MMHG | HEART RATE: 77 BPM | OXYGEN SATURATION: 100 % | HEIGHT: 65 IN | WEIGHT: 164.9 LBS | RESPIRATION RATE: 16 BRPM | TEMPERATURE: 97.2 F

## 2024-07-27 PROBLEM — R31.9 HEMATURIA, UNSPECIFIED TYPE: Status: RESOLVED | Noted: 2024-07-26 | Resolved: 2024-07-27

## 2024-07-27 LAB
ALBUMIN SERPL BCP-MCNC: 3.5 G/DL (ref 3.4–5)
ALP SERPL-CCNC: 37 U/L (ref 33–136)
ALT SERPL W P-5'-P-CCNC: 18 U/L (ref 10–52)
ANION GAP SERPL CALC-SCNC: 15 MMOL/L (ref 10–20)
AST SERPL W P-5'-P-CCNC: 19 U/L (ref 9–39)
BACTERIA UR CULT: NO GROWTH
BILIRUB SERPL-MCNC: 0.8 MG/DL (ref 0–1.2)
BUN SERPL-MCNC: 85 MG/DL (ref 6–23)
CALCIUM SERPL-MCNC: 8.5 MG/DL (ref 8.6–10.3)
CHLORIDE SERPL-SCNC: 101 MMOL/L (ref 98–107)
CO2 SERPL-SCNC: 18 MMOL/L (ref 21–32)
CREAT SERPL-MCNC: 3.28 MG/DL (ref 0.5–1.3)
EGFRCR SERPLBLD CKD-EPI 2021: 18 ML/MIN/1.73M*2
ERYTHROCYTE [DISTWIDTH] IN BLOOD BY AUTOMATED COUNT: 15.7 % (ref 11.5–14.5)
GLUCOSE SERPL-MCNC: 99 MG/DL (ref 74–99)
HCT VFR BLD AUTO: 30.8 % (ref 41–52)
HGB BLD-MCNC: 9.9 G/DL (ref 13.5–17.5)
MCH RBC QN AUTO: 28 PG (ref 26–34)
MCHC RBC AUTO-ENTMCNC: 32.1 G/DL (ref 32–36)
MCV RBC AUTO: 87 FL (ref 80–100)
NRBC BLD-RTO: ABNORMAL /100{WBCS}
PLATELET # BLD AUTO: 129 X10*3/UL (ref 150–450)
POTASSIUM SERPL-SCNC: 4.4 MMOL/L (ref 3.5–5.3)
PROT SERPL-MCNC: 6.4 G/DL (ref 6.4–8.2)
RBC # BLD AUTO: 3.53 X10*6/UL (ref 4.5–5.9)
SODIUM SERPL-SCNC: 130 MMOL/L (ref 136–145)
WBC # BLD AUTO: 8.2 X10*3/UL (ref 4.4–11.3)

## 2024-07-27 PROCEDURE — 36415 COLL VENOUS BLD VENIPUNCTURE: CPT | Performed by: EMERGENCY MEDICINE

## 2024-07-27 PROCEDURE — 2500000004 HC RX 250 GENERAL PHARMACY W/ HCPCS (ALT 636 FOR OP/ED): Performed by: EMERGENCY MEDICINE

## 2024-07-27 PROCEDURE — 97116 GAIT TRAINING THERAPY: CPT | Mod: GP

## 2024-07-27 PROCEDURE — 80053 COMPREHEN METABOLIC PANEL: CPT | Performed by: EMERGENCY MEDICINE

## 2024-07-27 PROCEDURE — 97530 THERAPEUTIC ACTIVITIES: CPT | Mod: GP

## 2024-07-27 PROCEDURE — 85027 COMPLETE CBC AUTOMATED: CPT | Performed by: EMERGENCY MEDICINE

## 2024-07-27 PROCEDURE — 97161 PT EVAL LOW COMPLEX 20 MIN: CPT | Mod: GP

## 2024-07-27 PROCEDURE — 2500000001 HC RX 250 WO HCPCS SELF ADMINISTERED DRUGS (ALT 637 FOR MEDICARE OP): Performed by: EMERGENCY MEDICINE

## 2024-07-27 RX ADMIN — CARVEDILOL 6.25 MG: 3.12 TABLET, FILM COATED ORAL at 08:43

## 2024-07-27 RX ADMIN — HYDRALAZINE HYDROCHLORIDE 25 MG: 25 TABLET, FILM COATED ORAL at 08:43

## 2024-07-27 RX ADMIN — CHOLECALCIFEROL TAB 25 MCG (1000 UNIT) 2000 UNITS: 25 TAB at 08:43

## 2024-07-27 RX ADMIN — FUROSEMIDE 80 MG: 40 TABLET ORAL at 08:43

## 2024-07-27 RX ADMIN — POLYETHYLENE GLYCOL 3350 17 G: 17 POWDER, FOR SOLUTION ORAL at 08:42

## 2024-07-27 RX ADMIN — SODIUM CHLORIDE 50 ML/HR: 900 INJECTION, SOLUTION INTRAVENOUS at 05:33

## 2024-07-27 RX ADMIN — ASPIRIN 81 MG: 81 TABLET, CHEWABLE ORAL at 08:43

## 2024-07-27 RX ADMIN — ACETAMINOPHEN 650 MG: 325 TABLET ORAL at 17:04

## 2024-07-27 RX ADMIN — ISOSORBIDE MONONITRATE 30 MG: 30 TABLET, EXTENDED RELEASE ORAL at 08:42

## 2024-07-27 ASSESSMENT — PAIN - FUNCTIONAL ASSESSMENT
PAIN_FUNCTIONAL_ASSESSMENT: FLACC (FACE, LEGS, ACTIVITY, CRY, CONSOLABILITY)
PAIN_FUNCTIONAL_ASSESSMENT: 0-10
PAIN_FUNCTIONAL_ASSESSMENT: FLACC (FACE, LEGS, ACTIVITY, CRY, CONSOLABILITY)

## 2024-07-27 ASSESSMENT — COGNITIVE AND FUNCTIONAL STATUS - GENERAL
MOBILITY SCORE: 24
MOBILITY SCORE: 24
DAILY ACTIVITIY SCORE: 23
TOILETING: A LITTLE

## 2024-07-27 ASSESSMENT — ACTIVITIES OF DAILY LIVING (ADL)
ADLS_ADDRESSED: YES
ADL_ASSISTANCE: INDEPENDENT

## 2024-07-27 ASSESSMENT — PAIN SCALES - GENERAL
PAINLEVEL_OUTOF10: 0 - NO PAIN
PAINLEVEL_OUTOF10: 5 - MODERATE PAIN
PAINLEVEL_OUTOF10: 0 - NO PAIN

## 2024-07-27 ASSESSMENT — PAIN DESCRIPTION - ORIENTATION: ORIENTATION: LEFT

## 2024-07-27 ASSESSMENT — PAIN DESCRIPTION - LOCATION: LOCATION: KNEE

## 2024-07-27 NOTE — NURSING NOTE
Labs drawn and sent as per order.  Voices no c/o pain.  CBI continues with light pink urine returned.

## 2024-07-27 NOTE — NURSING NOTE
Resting in bed with eyes closed and call light within reach.  Bed alarm on.  Continuous bladder irrigation continues with light pink urine returned with no visible blood clots noted.

## 2024-07-27 NOTE — PROGRESS NOTES
"Tom Haas is a 79 y.o. male on day 1 of admission presenting with Hematuria, unspecified type.    Subjective   79-year-old gentleman admitted with hematuria       Objective     Physical Exam  Patient is alert in no acute distress  Lungs are clear to auscultation and percussion  Cardiac is regular rate and rhythm normal S1-S2 without murmur gallop rub click S3 or S4  Abdomen is soft nontender positive bowel sounds there is no hepatosplenomegaly or CVA tenderness appreciated  Extremities without cyanosis clubbing erythema or edema  : Three-way in place draining clear urine  Last Recorded Vitals  Blood pressure 141/80, pulse 78, temperature 36.9 °C (98.4 °F), temperature source Temporal, resp. rate 16, height 1.651 m (5' 5\"), weight 74.8 kg (164 lb 14.5 oz), SpO2 98%.  Intake/Output last 3 Shifts:  I/O last 3 completed shifts:  In: 739.2 (9.9 mL/kg) [P.O.:240; I.V.:449.2 (6 mL/kg); IV Piggyback:50]  Out: 700 (9.4 mL/kg) [Urine:700 (0.3 mL/kg/hr)]  Weight: 74.8 kg     Relevant Results  Scheduled medications  aspirin, 81 mg, oral, Daily  atorvastatin, 20 mg, oral, Nightly  carvedilol, 6.25 mg, oral, BID  cefTRIAXone, 1 g, intravenous, q24h  cholecalciferol, 2,000 Units, oral, Daily  furosemide, 80 mg, oral, Daily  hydrALAZINE, 25 mg, oral, BID  isosorbide mononitrate ER, 30 mg, oral, Daily  polyethylene glycol, 17 g, oral, Daily      Continuous medications  sodium chloride 0.9%, 50 mL/hr, Last Rate: 50 mL/hr (07/27/24 0533)      PRN medications  PRN medications: acetaminophen, benzocaine-menthol, docusate sodium, lubricating eye drops, melatonin, ondansetron, simethicone, sodium chloride  Results for orders placed or performed during the hospital encounter of 07/26/24 (from the past 24 hour(s))   CBC   Result Value Ref Range    WBC 8.2 4.4 - 11.3 x10*3/uL    nRBC      RBC 3.53 (L) 4.50 - 5.90 x10*6/uL    Hemoglobin 9.9 (L) 13.5 - 17.5 g/dL    Hematocrit 30.8 (L) 41.0 - 52.0 %    MCV 87 80 - 100 fL    MCH 28.0 26.0 " - 34.0 pg    MCHC 32.1 32.0 - 36.0 g/dL    RDW 15.7 (H) 11.5 - 14.5 %    Platelets 129 (L) 150 - 450 x10*3/uL   Comprehensive metabolic panel   Result Value Ref Range    Glucose 99 74 - 99 mg/dL    Sodium 130 (L) 136 - 145 mmol/L    Potassium 4.4 3.5 - 5.3 mmol/L    Chloride 101 98 - 107 mmol/L    Bicarbonate 18 (L) 21 - 32 mmol/L    Anion Gap 15 10 - 20 mmol/L    Urea Nitrogen 85 (H) 6 - 23 mg/dL    Creatinine 3.28 (H) 0.50 - 1.30 mg/dL    eGFR 18 (L) >60 mL/min/1.73m*2    Calcium 8.5 (L) 8.6 - 10.3 mg/dL    Albumin 3.5 3.4 - 5.0 g/dL    Alkaline Phosphatase 37 33 - 136 U/L    Total Protein 6.4 6.4 - 8.2 g/dL    AST 19 9 - 39 U/L    Bilirubin, Total 0.8 0.0 - 1.2 mg/dL    ALT 18 10 - 52 U/L                      Assessment/Plan   Principal Problem:    Hematuria, unspecified type  Patient remains clinically stable  Rodriguez is draining clear urine this morning-will clamp irrigation  Urology following  Urinalysis no growth    Coronary artery disease  -Remained stable  -Continue current medication    DVT prophylaxis  -Platelets are dropping but may be secondary to ceftriaxone which will be discontinued today secondary to no growth in the urine  -SCD  -Will hold heparin       I spent 35 minutes in the professional and overall care of this patient.      Kelsea Gurrola MD

## 2024-07-27 NOTE — NURSING NOTE
Resting in bed with call light within reach and bed in lowest/locked position.  Bed alarm is on.  Voices no c/o pain or discomfort at this time.  Three-way marin catheter is clamped and draining clear, dark red urine with no visible clots.

## 2024-07-27 NOTE — NURSING NOTE
0521pm Pt continues to void without difficulty, urine remains yellow clear. Pt and daughter given written and verbal discharge instructions with verbalized understanding. Pt's iv heplock removed without difficulty. Pt escorted down via wheelchair with all personal belongings accompanying.

## 2024-07-27 NOTE — NURSING NOTE
1151am Pt's marin cath removed per orders without difficulty with 300cc yellow clear urine. Pt tolerated procedure well without complaints. Pt educated and advised  importance of voiding within 6-8 hours, fluids encouraged, family at bedside.

## 2024-07-27 NOTE — CARE PLAN
The patient's goals for the shift include resolve retention    The clinical goals for the shift include resolve retention

## 2024-07-27 NOTE — NURSING NOTE
0908am Pt's CBI clamped at this time due to urine completely yellow clear, no clots no signs of blood. Pt in bed with call light in reach.

## 2024-07-27 NOTE — NURSING NOTE
0305pm Pt voided without difficulty of 200cc yellow clear urine. Pt sitting up in recliner with family at bedside encouraged to continue to drink fluids, call light in reach.

## 2024-07-27 NOTE — DISCHARGE SUMMARY
Discharge Diagnosis  Hematuria, unspecified type    Issues Requiring follow up  Follow-up with Dr. Castanon and your PCP  Discharge Meds     Your medication list        CHANGE how you take these medications        Instructions Last Dose Given Next Dose Due   furosemide 80 mg tablet  Commonly known as: Lasix  What changed: additional instructions      Take 1 tablet (80 mg) by mouth once daily.              CONTINUE taking these medications        Instructions Last Dose Given Next Dose Due   aspirin 81 mg chewable tablet      Chew 1 tablet (81 mg) once daily. Do not start before March 14, 2024.       atorvastatin 20 mg tablet  Commonly known as: Lipitor      Take 1 tablet (20 mg) by mouth once daily at bedtime.       carvedilol 6.25 mg tablet  Commonly known as: Coreg      Take 1 tablet (6.25 mg) by mouth 2 times a day.       cholecalciferol 50 MCG (2000 UT) tablet  Commonly known as: Vitamin D-3      Take 1 tablet (2,000 Units) by mouth once daily. Do not start before March 14, 2024.       hydrALAZINE 25 mg tablet  Commonly known as: Apresoline      Take 1 tablet (25 mg) by mouth 2 times a day.       isosorbide mononitrate ER 30 mg 24 hr tablet  Commonly known as: Imdur      Take 1 tablet (30 mg) by mouth once daily. Do not crush or chew.                Test Results Pending At Discharge  Pending Labs       No current pending labs.            Hospital Course   79-year-old was transferred from [facility] to [facility] for further evaluation and management of his hematuria; Rodriguez was placed with continuous bladder irrigation;; urine cleared day of discharge; CT without contrast did not show any acute process; patient remained clinically stable  Rodriguez was removed day of discharge and patient is voiding spontaneously and without difficulty; clinically stable for discharge to home    Pertinent Physical Exam At Time of Discharge  Physical Exam  Patient is alert in no acute distress  Lungs are clear to auscultation  and percussion  Cardiac is regular rate and rhythm normal S1-S2 without murmur gallop rub click S3 or S4  Abdomen is soft nontender positive bowel sounds there is no hepatosplenomegaly or CVA tenderness appreciated  Extremities without cyanosis clubbing erythema or edema  Outpatient Follow-Up  Future Appointments   Date Time Provider Department Center   10/30/2024 10:15 AM Mansoor Hinkle MD [id number] [facility]         Kelsea Gurrola MD

## 2024-07-27 NOTE — CARE PLAN
Problem: Pain  Goal: LTG - Patient will manage pain with the appropriate technique/Intervention  Outcome: Progressing     Problem: Balance  Goal: LTG - Patient will maintain standing and sitting balance to allow for completion of daily activities  Outcome: Met     Problem: Mobility  Goal: LTG - Patient will ambulate community distance with cane at distant supervision  Outcome: Met  Goal: LTG - Patient will navigate 3 steps with rails/device at distant supervision  Outcome: Met     Problem: Safety  Goal: LTG - Patient will demonstrate safety requirements appropriate to situation/environment  Outcome: Met     Problem: PT Transfers  Goal: LTG - Patient will demonstrate safe transfer techniques with cane at distant supervision  Outcome: Met     Problem: Compromised Skin Integrity  Goal: LTG - Patient will be free from infection  Outcome: Met

## 2024-07-27 NOTE — NURSING NOTE
0745am Assumed care of pt, A/Ox 4 denies any c/o pains no distress noted. Pt has 3 way marin with CBI with light pink to clear urine output with no clots observed. Pt awake in bed encouraged to order breakfast, call light in reach.

## 2024-07-27 NOTE — PROGRESS NOTES
Patient sleeping at the time of rounds, chart reviewed.  Vital signs are stable.  Nursing reports that CBI was clamped during dayshift and the urine was becoming much darker.  CBI resumed and many clots were evacuated.           Su Michele MD

## 2024-07-27 NOTE — NURSING NOTE
Continuous bladder irrigation stopped on day shift, three way marin catheter is clamped.  Minimal urine in marin bag that is clear, dark red with no visible blood clots.  Dr. Michele requested to have continuous bladder irrigation restarted at this time.

## 2024-07-27 NOTE — PROGRESS NOTES
Physical Therapy Evaluation & Treatment    Patient Name: Tom Haas  MRN: [id number]  Today's Date: 7/27/2024   Time Calculation  Start Time: 0913  Stop Time: 0951  Time Calculation (min): 38 min    Assessment/Plan   PT Assessment  PT Assessment Results: Decreased endurance, Decreased mobility, Pain  Rehab Prognosis: Excellent  Evaluation/Treatment Tolerance: Patient tolerated treatment well  Strengths: Ability to acquire knowledge, Access to adaptive/assistive products, Attitude of self, Capable of completing ADLs semi/independent, Insight into problems, Premorbid level of function, Support of Caregivers, Support and attitude of living partners, Support of extended family/friends  End of Session Communication: Bedside nurse  Assessment Comment: Pt low complexity eval presenting with increased B knee pain and minimal deficits to functional mobility. Able to participate in bed mobility, functional transfers, ambulation, and stair negotiation at high functional level with use of single point cane. No knee buckle or LOB occurred. Pt did require seated and standing rest breaks during activity without SOB. Pt would benefit from continued PT during hospital stay to prevent further deconditioning. PT recommends DC home with assist as needed and follow-up with OP PT.     End of Session Patient Position: Up in chair with all needs met. Rodriguez catheter in place draining to gravity without kinks. Call light left in reach; patient instructed not to get up on own and verbalized understanding of this. RN aware.    IP OR SWING BED PT PLAN  Inpatient or Swing Bed: Inpatient  PT Plan  Treatment/Interventions: Bed mobility, Transfer training, Gait training, Stair training, Strengthening, Endurance training, Range of motion, Therapeutic exercise, Therapeutic activity, Home exercise program, Positioning  PT Plan: Ongoing PT  PT Frequency: 5 times per week  PT Discharge Recommendations: Low intensity level of continued care  Equipment  Recommended upon Discharge: Straight cane  PT Recommended Transfer Status: Stand by assist, Assistive device  PT - OK to Discharge: Yes      Subjective     General Visit Information:  General  Reason for Referral: Pt presented to [facility] ED on 7/25 for hematuria with urinary retention. Transferred to [facility] on 7/26 for further workup. PT consulted 7/27 for functional mobility assessment.  Referred By: Dr. Gurrola  Past Medical History Relevant to Rehab: NSTEMI, CHF, CKD, HTN, CAD, hypercholesterolemia, lipidemia (plans for pacemaker placement in October)  Family/Caregiver Present: No  Prior to Session Communication: Bedside nurse  Patient Position Received: Bed, 3 rail up, Alarm on  General Comment: Session cleared by RN. Pt very pleasant and agreeable to PT evaluation. Rodriguez catheter in place draining to gravity without kinks.   Home Living:  Home Living  Type of Home: House  Lives With: Spouse, Adult children  Home Adaptive Equipment: Cane, Crutches  Home Layout: Multi-level, Stairs to alternate level with rails, Work area in basement  Alternate Level Stairs-Rails: Left  Alternate Level Stairs-Number of Steps: 13  Home Access: Stairs to enter with rails  Entrance Stairs-Rails: Right  Entrance Stairs-Number of Steps: 3+1+3  Bathroom Shower/Tub: Walk-in shower  Bathroom Equipment: Grab bars in shower  Prior Level of Function:  Prior Function Per Pt/Caregiver Report  Level of [place]: Independent with ADLs and functional transfers, Independent with homemaking with ambulation  ADL Assistance: Independent  Homemaking Assistance: Independent  Ambulatory Assistance: Needs assistance  Gait: Assistive device (cane prn)  Stairs: Assistive device, Railings  Vocational: Retired  Leisure: boating  Prior Function Comments: Pt denies falls prior to admission  Precautions:  Precautions  Braces Applied: pt with knee sleeve to L knee upon PT arrival    Objective   Pain:  Pain Assessment  Pain Assessment: 0-10  0-10 (Numeric) Pain  Score: 5 - Moderate pain  Pain Type: Chronic pain  Pain Location: Knee  Pain Orientation: Left, Right  Pain Interventions: Medication (See MAR), Ambulation/increased activity (PT offered hot or cold pack for c/o knee pain, pt refused. RN notified of pt request for pain meds)  Cognition:  Cognition  Overall Cognitive Status: Within Functional Limits  Attention: Within Functional Limits  Memory: Within Funtional Limits  Problem Solving: Within Functional Limits  Safety/Judgement: Within Functional Limits  Insight: Within function limits  Impulsive: Within functional limits  Processing Speed: Within funtional limits    General Assessments:  Activity Tolerance  Endurance: Tolerates 30 min exercise with multiple rests  Activity Tolerance Comments: pt required standing rest break x1, seated rest break x2 during ambulation and stair negotiation. no SOB noted. pt reported he needed to rest d/t B knee pain (L>R) and stiffness from being in bed    Coordination  Movements are Fluid and Coordinated: Yes    Postural Control  Postural Control: Within Functional Limits    Static Sitting Balance  Static Sitting-Balance Support: Feet supported, Bilateral upper extremity supported  Static Sitting-Level of Assistance: Independent  Dynamic Sitting Balance  Dynamic Sitting-Balance Support: Feet supported, Bilateral upper extremity supported  Dynamic Sitting-Balance: Forward lean  Dynamic Sitting-Comments: independent    Static Standing Balance  Static Standing-Balance Support: Right upper extremity supported  Static Standing-Level of Assistance: Modified independent  Static Standing-Comment/Number of Minutes: with single point cane  Dynamic Standing Balance  Dynamic Standing-Balance Support: Right upper extremity supported  Dynamic Standing-Comments: distant supervision with single point cane  Functional Assessments:  ADL  ADL's Addressed: Yes  UE Dressing Deficit: Thread LUE, Thread RUE, Pull around back (pt able to don gown with  "assist around back with opening in front. PT assisted with pulling around back, pt able to thread BUE through gown)    Bed Mobility  Bed Mobility: Yes  Bed Mobility 1  Bed Mobility 1: Supine to sitting  Level of Assistance 1: Independent  Bed Mobility Comments 1: able to exit bed to L without use of rails with HOB elevated    Transfers  Transfer: Yes  Transfer 1  Transfer From 1: Bed to, Stand to  Transfer to 1: Stand, Chair with arms  Technique 1: Sit to stand, Stand to sit  Transfer Device 1: Cane  Transfer Level of Assistance 1: Modified independent  Trials/Comments 1: x5 trials; pt demonstrated safe body mechanics during transfer, completing with LUE on armrests of chair. no LOB    Ambulation/Gait Training  Ambulation/Gait Training Performed: Yes  Ambulation/Gait Training 1  Surface 1: Level tile  Device 1: Single point cane  Gait Support Devices: Other (Comment) (sleeve to L knee)  Assistance 1: Distant supervision  Quality of Gait 1: Decreased step length, Antalgic, Forward flexed posture (pt demonstrated decreased olga and reciprocal gait pattern with use of cane in R hand; no knee buckling or LOB)  Comments/Distance (ft) 1: 50 feet x2 requiring standing rest break; 125 feet following stair negotiation improved tolerance    Stairs  Stairs: Yes  Stairs  Rails 1: Left  Device 1: Railing, Single point cane  Support Devices 1: Other (Comment) (sleeve to L knee)  Assistance 1: Distant supervision  Comment/Number of Steps 1: pt completed three 6\" steps via step to pattern with correct sequencing of cane and BLE. no LOB or knee buckle  Stairs 2  Rails 2: Right  Device 2: Railing, Single point cane  Support Devices 2: Other (Comment) (sleeve to L knee)  Assistance 2: Distant supervision, Minimal verbal cues (cued x1 for safe cane placement during descent)  Comment/Number of Steps 2: pt completed three 6\" steps via step to pattern. required 1x verbal cue for correct sequencing of cane. no knee buckle or " LOB  Extremity/Trunk Assessments:  RUE   RUE : Within Functional Limits  LUE   LUE: Within Functional Limits  RLE   RLE : Exceptions to WFL (chronic R knee pain mildly limiting endurance at time of PT eval however AROM and strength WFL)  LLE   LLE : Exceptions to WFL (chronic L knee pain mildly limiting endurance at time of PT eval however AROM and strength WFL)  Treatments:  Therapeutic Activity  Therapeutic Activity Performed: Yes  Therapeutic Activity 1: PT provided pt education on use of assistive device, stair negotiation, sequencing and body mechanics during functional mobility, supervision upon DC home, outpatient PT for chronic B knee pain, use of call light for assist; pt verbalized understanding.  Outcome Measures:  [facility] Basic Mobility  Turning from your back to your side while in a flat bed without using bedrails: None  Moving from lying on your back to sitting on the side of a flat bed without using bedrails: None  Moving to and from bed to chair (including a wheelchair): None  Standing up from a chair using your arms (e.g. wheelchair or bedside chair): None  To walk in hospital room: None  Climbing 3-5 steps with railing: None  Basic Mobility - Total Score: 24    Encounter Problems       Encounter Problems (Active)            Encounter Problems (Resolved)       Balance       LTG - Patient will maintain standing and sitting balance to allow for completion of daily activities (Met)       Start:  07/27/24    Resolved:  07/27/24            Compromised Skin Integrity       LTG - Patient will be free from infection (Met)       Start:  07/27/24    Resolved:  07/27/24            Mobility       LTG - Patient will ambulate community distance with cane at distant supervision (Met)       Start:  07/27/24    Resolved:  07/27/24         LTG - Patient will navigate 3 steps with rails/device at distant supervision (Met)       Start:  07/27/24    Resolved:  07/27/24            PT Transfers       LTG - Patient will  demonstrate safe transfer techniques with cane at distant supervision (Met)       Start:  07/27/24    Resolved:  07/27/24            Safety       LTG - Patient will demonstrate safety requirements appropriate to situation/environment (Met)       Start:  07/27/24    Resolved:  07/27/24                Education Documentation  Handouts, taught by Charisse Walker PT at 7/27/2024 10:45 AM.  Learner: Patient  Readiness: Eager  Method: Explanation, Demonstration  Response: Verbalizes Understanding, Demonstrated Understanding    Precautions, taught by Charisse Walker PT at 7/27/2024 10:45 AM.  Learner: Patient  Readiness: Eager  Method: Explanation, Demonstration  Response: Verbalizes Understanding, Demonstrated Understanding    Body Mechanics, taught by Charisse Walker PT at 7/27/2024 10:45 AM.  Learner: Patient  Readiness: Eager  Method: Explanation, Demonstration  Response: Verbalizes Understanding, Demonstrated Understanding    Home Exercise Program, taught by Charisse Walker PT at 7/27/2024 10:45 AM.  Learner: Patient  Readiness: Eager  Method: Explanation, Demonstration  Response: Verbalizes Understanding, Demonstrated Understanding    Mobility Training, taught by Charisse Walker PT at 7/27/2024 10:45 AM.  Learner: Patient  Readiness: Eager  Method: Explanation, Demonstration  Response: Verbalizes Understanding, Demonstrated Understanding    Education Comments  No comments found.        Charisse Walker PT, DPT

## 2024-07-27 NOTE — PROGRESS NOTES
"Tom Haas is a 79 y.o. male on day 1 of admission presenting with Hematuria, unspecified type.    Subjective   He feels better today.  His urine has cleared.  Urine culture is negative.       Objective     Physical Exam  Awake, alert, oriented  Neck: Supple  Lungs: Normal respiratory pattern  : Rodriguez catheter in place draining clear urine  Last Recorded Vitals  Blood pressure 142/72, pulse 88, temperature 36.7 °C (98.1 °F), temperature source Temporal, resp. rate 16, height 1.651 m (5' 5\"), weight 74.8 kg (164 lb 14.5 oz), SpO2 100%.  Intake/Output last 3 Shifts:  I/O last 3 completed shifts:  In: 739.2 (9.9 mL/kg) [P.O.:240; I.V.:449.2 (6 mL/kg); IV Piggyback:50]  Out: 700 (9.4 mL/kg) [Urine:700 (0.3 mL/kg/hr)]  Weight: 74.8 kg     Relevant Results                Results for orders placed or performed during the hospital encounter of 07/26/24 (from the past 24 hour(s))   CBC   Result Value Ref Range    WBC 8.2 4.4 - 11.3 x10*3/uL    nRBC      RBC 3.53 (L) 4.50 - 5.90 x10*6/uL    Hemoglobin 9.9 (L) 13.5 - 17.5 g/dL    Hematocrit 30.8 (L) 41.0 - 52.0 %    MCV 87 80 - 100 fL    MCH 28.0 26.0 - 34.0 pg    MCHC 32.1 32.0 - 36.0 g/dL    RDW 15.7 (H) 11.5 - 14.5 %    Platelets 129 (L) 150 - 450 x10*3/uL   Comprehensive metabolic panel   Result Value Ref Range    Glucose 99 74 - 99 mg/dL    Sodium 130 (L) 136 - 145 mmol/L    Potassium 4.4 3.5 - 5.3 mmol/L    Chloride 101 98 - 107 mmol/L    Bicarbonate 18 (L) 21 - 32 mmol/L    Anion Gap 15 10 - 20 mmol/L    Urea Nitrogen 85 (H) 6 - 23 mg/dL    Creatinine 3.28 (H) 0.50 - 1.30 mg/dL    eGFR 18 (L) >60 mL/min/1.73m*2    Calcium 8.5 (L) 8.6 - 10.3 mg/dL    Albumin 3.5 3.4 - 5.0 g/dL    Alkaline Phosphatase 37 33 - 136 U/L    Total Protein 6.4 6.4 - 8.2 g/dL    AST 19 9 - 39 U/L    Bilirubin, Total 0.8 0.0 - 1.2 mg/dL    ALT 18 10 - 52 U/L            CT abdomen pelvis wo IV contrast    Result Date: 7/26/2024  Interpreted By:  Sunshine lAvarez, STUDY: CT ABDOMEN PELVIS WO IV " CONTRAST;  7/26/2024 2:45 pm   INDICATION: Signs/Symptoms:hematuria.   COMPARISON: None.   ACCESSION NUMBER(S): [id number]   ORDERING CLINICIAN: KERRY KAISER   TECHNIQUE: CT of the abdomen and pelvis was performed without intravenous contrast. Sagittal and coronal reconstructions were generated.   FINDINGS: LOWER CHEST: There is right lower lobe atelectasis or infiltrate. There is a right pleural effusion.   There is a tiny left pleural effusion.   The heart is enlarged. There are coronary artery calcifications. There is a 7 mm left lower lobe nodule.   ABDOMEN:   LIVER: Unremarkable   BILE DUCTS: Unremarkable   GALLBLADDER: There are no obvious gallstones.   PANCREAS: Unremarkable   SPLEEN: Unremarkable   ADRENAL GLANDS: There are no adrenal masses.   KIDNEYS AND URETERS: The right kidney is atrophic. There is right hydronephrosis. There is no right hydroureter. This might be a chronic UPJ obstruction.   The left kidney is not obstructed. There are left renal cortical cysts.       PELVIS:   BLADDER: A Rodriguez catheter is present in the urinary bladder. The bladder wall appears thickened.   REPRODUCTIVE ORGANS: The prostate is visualized.   BOWEL: There is no significant bowel distention.   There are colonic diverticula. There is a suggestion of a normal caliber appendix.   VESSELS: There are atherosclerotic changes of the aorta. The cava is unremarkable.   PERITONEUM/RETROPERITONEUM/LYMPH NODES: There is no free air or free fluid.   There is no significant lymphadenopathy.   BONES AND ABDOMINAL WALL: There are degenerative changes of the spine.   COMPARISON OF FINDINGS: The abdomen and pelvis are similar. The pleural effusions have largely resolved.       Right lower lobe consolidation/atelectasis. Right pleural effusion.   Enlarged heart. Coronary artery disease.   Hydronephrosis the right kidney with marked cortical atrophy.   Thick walled bladder. This may be due to lack of distention.   MACRO: none   Signed by:  Sunshine Alvarez 7/26/2024 3:41 PM Dictation workstation:   [id number]        Assessment/Plan   Principal Problem:    Hematuria, unspecified type    Hematuria: This is cleared.  Urine is now latoya-colored.  Urine culture is negative.  Etiology of the hematuria is still unclear.  The urine culture was negative.  He will need a cystoscopy as an outpatient and this was discussed with him.    Urinary retention: This is likely clot retention.  Will order the catheter out today now that the urine is clear.    Atrophic right kidney: This is chronic.  The left kidney looks normal.  He does have a history of chronic renal failure and is followed by nephrology.             Terell Castanon MD

## 2024-07-27 NOTE — NURSING NOTE
Continuous bladder irrigation with normal saline continues with light pink urine returned.  Call light within reach.  Voices no concerns or c/o pain at this time.

## 2024-07-27 NOTE — DISCHARGE SUMMARY
Discharge Diagnosis  Hematuria, unspecified type  Urinary retention  UTI    Issues Requiring Follow-Up  Gross hematuria  Urinary retention  UTI  CKD4    Discharge Meds     Your medication list        ASK your doctor about these medications        Instructions Last Dose Given Next Dose Due   aspirin 81 mg chewable tablet      Chew 1 tablet (81 mg) once daily. Do not start before March 14, 2024.       atorvastatin 20 mg tablet  Commonly known as: Lipitor      Take 1 tablet (20 mg) by mouth once daily at bedtime.       carvedilol 6.25 mg tablet  Commonly known as: Coreg      Take 1 tablet (6.25 mg) by mouth 2 times a day.       cholecalciferol 50 MCG (2000 UT) tablet  Commonly known as: Vitamin D-3      Take 1 tablet (2,000 Units) by mouth once daily. Do not start before March 14, 2024.       furosemide 80 mg tablet  Commonly known as: Lasix      Take 1 tablet (80 mg) by mouth once daily.       hydrALAZINE 25 mg tablet  Commonly known as: Apresoline      Take 1 tablet (25 mg) by mouth 2 times a day.       isosorbide mononitrate ER 30 mg 24 hr tablet  Commonly known as: Imdur      Take 1 tablet (30 mg) by mouth once daily. Do not crush or chew.                Test Results Pending At Discharge  Pending Labs       No current pending labs.            Hospital Course  Tom Haas is a 79 y.o. male with a past medical history of combined systolic and diastolic CHF, CKD stage IV, mild aortic stenosis hypertension, hyperlipidemia ,CAD by [facility] '05 (Lcx 80-90%, LAD 50-60%, RCA 40%), and carotid stenosis, who presented to [facility] complaining of gross hematuria with urinary retention.  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  He does complain of suprapubic pressure but otherwise no pain denies any back pain fever chills nausea or vomiting.  He takes aspirin but no other anticoagulants.  His nephrologist sent him into the ER.  Bladder scan showed urinary retention 900 mL and Rodriguez catheter was  placed with three-way bladder irrigation initiated.  UA was consistent with infection.  Urine was bloody and he received Levaquin and was transferred to [facility] due to bed availability and [facility]    Pertinent Physical Exam At Time of Discharge  Physical Exam  Vitals and nursing note reviewed.   Constitutional:       Appearance: Normal appearance.   HENT:      Head: Normocephalic.      Right Ear: External ear normal.      Left Ear: External ear normal.      Nose: Nose normal.      Mouth/Throat:      Mouth: Mucous membranes are dry.      Pharynx: Oropharynx is clear.   Eyes:      Extraocular Movements: Extraocular movements intact.      Conjunctiva/sclera: Conjunctivae normal.      Pupils: Pupils are equal, round, and reactive to light.   Cardiovascular:      Rate and Rhythm: Normal rate and regular rhythm.   Pulmonary:      Effort: Pulmonary effort is normal.      Breath sounds: Normal breath sounds.   Abdominal:      General: Abdomen is flat. Bowel sounds are normal.      Palpations: Abdomen is soft.   Genitourinary:     Comments: Rodriguez catheter in place draining bloody urine  Musculoskeletal:         General: Normal range of motion.   Skin:     General: Skin is warm and dry.   Neurological:      General: No focal deficit present.      Mental Status: He is alert. Mental status is at baseline.   Psychiatric:         Mood and Affect: Mood normal.         Behavior: Behavior normal.         Outpatient Follow-Up  Future Appointments   Date Time Provider Department Center   10/30/2024 10:15 AM Mansoor Hinkle MD [id number] Jesus Abreu DO

## 2024-07-27 NOTE — NURSING NOTE
0415pm  and Dr. Castanon updated on pt's progress after marin removal. 's agree pt ready for dc home, pt advised and made aware family at bedside will dc once pt is ready.

## 2024-07-27 NOTE — NURSING NOTE
Resting in bed with eyes closed and call light within reach. Bed alarm on.  Normal saline continuous bladder irrigation continues via three-way marin with light pink returned in marin bag

## 2024-07-29 ENCOUNTER — PATIENT OUTREACH (OUTPATIENT)
Dept: CARE COORDINATION | Facility: CLINIC | Age: 79
End: 2024-07-29
Payer: MEDICARE

## 2024-08-01 DIAGNOSIS — I50.20 HFREF (HEART FAILURE WITH REDUCED EJECTION FRACTION) (MULTI): ICD-10-CM

## 2024-08-01 RX ORDER — FERROUS SULFATE 324(65)MG
65 TABLET, DELAYED RELEASE (ENTERIC COATED) ORAL
Qty: 60 TABLET | Refills: 3 | Status: SHIPPED | OUTPATIENT
Start: 2024-08-01

## 2024-08-01 RX ORDER — SODIUM BICARBONATE 650 MG/1
325 TABLET ORAL 4 TIMES DAILY
Qty: 60 TABLET | Refills: 4 | Status: SHIPPED | OUTPATIENT
Start: 2024-08-01 | End: 2024-12-29

## 2024-08-01 RX ORDER — HYDRALAZINE HYDROCHLORIDE 50 MG/1
50 TABLET, FILM COATED ORAL 2 TIMES DAILY
Qty: 180 TABLET | Refills: 3 | Status: SHIPPED | OUTPATIENT
Start: 2024-08-01 | End: 2025-08-01

## 2024-08-16 ENCOUNTER — TELEPHONE (OUTPATIENT)
Dept: CARDIOLOGY | Facility: HOSPITAL | Age: 79
End: 2024-08-16
Payer: MEDICARE

## 2024-08-16 NOTE — TELEPHONE ENCOUNTER
Patient was told he needs to have cataract surgery and wife wants to make sure he is okay to have procedure.

## 2024-09-23 ENCOUNTER — LAB (OUTPATIENT)
Dept: LAB | Facility: LAB | Age: 79
End: 2024-09-23
Payer: MEDICARE

## 2024-09-23 DIAGNOSIS — Z01.818 PREOP TESTING: ICD-10-CM

## 2024-09-23 DIAGNOSIS — I50.22 CHRONIC SYSTOLIC HEART FAILURE: ICD-10-CM

## 2024-09-23 LAB
ANION GAP SERPL CALC-SCNC: 19 MMOL/L (ref 10–20)
BUN SERPL-MCNC: 73 MG/DL (ref 6–23)
CALCIUM SERPL-MCNC: 8.9 MG/DL (ref 8.6–10.3)
CHLORIDE SERPL-SCNC: 97 MMOL/L (ref 98–107)
CO2 SERPL-SCNC: 24 MMOL/L (ref 21–32)
CREAT SERPL-MCNC: 3.48 MG/DL (ref 0.5–1.3)
EGFRCR SERPLBLD CKD-EPI 2021: 17 ML/MIN/1.73M*2
ERYTHROCYTE [DISTWIDTH] IN BLOOD BY AUTOMATED COUNT: 15.3 % (ref 11.5–14.5)
GLUCOSE SERPL-MCNC: 102 MG/DL (ref 74–99)
HCT VFR BLD AUTO: 37.1 % (ref 41–52)
HGB BLD-MCNC: 11.7 G/DL (ref 13.5–17.5)
MCH RBC QN AUTO: 28.5 PG (ref 26–34)
MCHC RBC AUTO-ENTMCNC: 31.5 G/DL (ref 32–36)
MCV RBC AUTO: 91 FL (ref 80–100)
NRBC BLD-RTO: 0 /100 WBCS (ref 0–0)
PLATELET # BLD AUTO: 156 X10*3/UL (ref 150–450)
POTASSIUM SERPL-SCNC: 4.1 MMOL/L (ref 3.5–5.3)
RBC # BLD AUTO: 4.1 X10*6/UL (ref 4.5–5.9)
SODIUM SERPL-SCNC: 136 MMOL/L (ref 136–145)
WBC # BLD AUTO: 7.6 X10*3/UL (ref 4.4–11.3)

## 2024-09-23 PROCEDURE — 85027 COMPLETE CBC AUTOMATED: CPT

## 2024-09-23 PROCEDURE — 36415 COLL VENOUS BLD VENIPUNCTURE: CPT

## 2024-09-23 PROCEDURE — 80048 BASIC METABOLIC PNL TOTAL CA: CPT

## 2024-10-01 ENCOUNTER — APPOINTMENT (OUTPATIENT)
Dept: RADIOLOGY | Facility: HOSPITAL | Age: 79
End: 2024-10-01
Payer: MEDICARE

## 2024-10-01 ENCOUNTER — HOSPITAL ENCOUNTER (OUTPATIENT)
Facility: HOSPITAL | Age: 79
Setting detail: OBSERVATION
Discharge: HOME | End: 2024-10-02
Attending: INTERNAL MEDICINE | Admitting: INTERNAL MEDICINE
Payer: MEDICARE

## 2024-10-01 DIAGNOSIS — I50.22 CHRONIC SYSTOLIC HEART FAILURE: ICD-10-CM

## 2024-10-01 PROCEDURE — C1892 INTRO/SHEATH,FIXED,PEEL-AWAY: HCPCS | Performed by: INTERNAL MEDICINE

## 2024-10-01 PROCEDURE — C1722 AICD, SINGLE CHAMBER: HCPCS | Performed by: INTERNAL MEDICINE

## 2024-10-01 PROCEDURE — C1898 LEAD, PMKR, OTHER THAN TRANS: HCPCS | Performed by: INTERNAL MEDICINE

## 2024-10-01 PROCEDURE — 99152 MOD SED SAME PHYS/QHP 5/>YRS: CPT | Performed by: INTERNAL MEDICINE

## 2024-10-01 PROCEDURE — 71045 X-RAY EXAM CHEST 1 VIEW: CPT

## 2024-10-01 PROCEDURE — 33249 INSJ/RPLCMT DEFIB W/LEAD(S): CPT | Performed by: INTERNAL MEDICINE

## 2024-10-01 PROCEDURE — 2720000007 HC OR 272 NO HCPCS: Performed by: INTERNAL MEDICINE

## 2024-10-01 PROCEDURE — 7100000010 HC PHASE TWO TIME - EACH INCREMENTAL 1 MINUTE: Performed by: INTERNAL MEDICINE

## 2024-10-01 PROCEDURE — 2500000004 HC RX 250 GENERAL PHARMACY W/ HCPCS (ALT 636 FOR OP/ED): Mod: JZ | Performed by: INTERNAL MEDICINE

## 2024-10-01 PROCEDURE — 2780000003 HC OR 278 NO HCPCS: Performed by: INTERNAL MEDICINE

## 2024-10-01 PROCEDURE — G0378 HOSPITAL OBSERVATION PER HR: HCPCS

## 2024-10-01 PROCEDURE — 99153 MOD SED SAME PHYS/QHP EA: CPT | Performed by: INTERNAL MEDICINE

## 2024-10-01 PROCEDURE — 71045 X-RAY EXAM CHEST 1 VIEW: CPT | Performed by: RADIOLOGY

## 2024-10-01 PROCEDURE — 2750000001 HC OR 275 NO HCPCS: Performed by: INTERNAL MEDICINE

## 2024-10-01 PROCEDURE — 7100000009 HC PHASE TWO TIME - INITIAL BASE CHARGE: Performed by: INTERNAL MEDICINE

## 2024-10-01 PROCEDURE — C1777 LEAD, AICD, ENDO SINGLE COIL: HCPCS | Performed by: INTERNAL MEDICINE

## 2024-10-01 DEVICE — PACING LEAD
Type: IMPLANTABLE DEVICE | Site: HEART | Status: FUNCTIONAL
Brand: ULTIPACE™

## 2024-10-01 DEVICE — IMPLANTABLE CARDIOVERTER DEFIBRILLATOR
Type: IMPLANTABLE DEVICE | Site: CHEST  WALL | Status: FUNCTIONAL
Brand: GALLANT™

## 2024-10-01 DEVICE — DEFIBRILLATION LEAD
Type: IMPLANTABLE DEVICE | Site: HEART | Status: FUNCTIONAL
Brand: DURATA™

## 2024-10-01 RX ORDER — MIDAZOLAM HYDROCHLORIDE 1 MG/ML
INJECTION, SOLUTION INTRAMUSCULAR; INTRAVENOUS AS NEEDED
Status: DISCONTINUED | OUTPATIENT
Start: 2024-10-01 | End: 2024-10-01 | Stop reason: HOSPADM

## 2024-10-01 RX ORDER — FENTANYL CITRATE 50 UG/ML
INJECTION, SOLUTION INTRAMUSCULAR; INTRAVENOUS AS NEEDED
Status: DISCONTINUED | OUTPATIENT
Start: 2024-10-01 | End: 2024-10-01 | Stop reason: HOSPADM

## 2024-10-01 RX ORDER — CEFAZOLIN SODIUM 1 G/50ML
SOLUTION INTRAVENOUS CONTINUOUS PRN
Status: DISCONTINUED | OUTPATIENT
Start: 2024-10-01 | End: 2024-10-01 | Stop reason: HOSPADM

## 2024-10-01 RX ORDER — BUPIVACAINE HYDROCHLORIDE 2.5 MG/ML
INJECTION, SOLUTION EPIDURAL; INFILTRATION; INTRACAUDAL AS NEEDED
Status: DISCONTINUED | OUTPATIENT
Start: 2024-10-01 | End: 2024-10-01 | Stop reason: HOSPADM

## 2024-10-01 RX ORDER — VANCOMYCIN HYDROCHLORIDE 1 G/200ML
INJECTION, SOLUTION INTRAVENOUS CONTINUOUS PRN
Status: DISCONTINUED | OUTPATIENT
Start: 2024-10-01 | End: 2024-10-01 | Stop reason: HOSPADM

## 2024-10-01 SDOH — SOCIAL STABILITY: SOCIAL INSECURITY: DO YOU FEEL ANYONE HAS EXPLOITED OR TAKEN ADVANTAGE OF YOU FINANCIALLY OR OF YOUR PERSONAL PROPERTY?: NO

## 2024-10-01 SDOH — SOCIAL STABILITY: SOCIAL INSECURITY: ARE THERE ANY APPARENT SIGNS OF INJURIES/BEHAVIORS THAT COULD BE RELATED TO ABUSE/NEGLECT?: NO

## 2024-10-01 SDOH — SOCIAL STABILITY: SOCIAL INSECURITY: WERE YOU ABLE TO COMPLETE ALL THE BEHAVIORAL HEALTH SCREENINGS?: YES

## 2024-10-01 SDOH — SOCIAL STABILITY: SOCIAL INSECURITY: DO YOU FEEL UNSAFE GOING BACK TO THE PLACE WHERE YOU ARE LIVING?: NO

## 2024-10-01 SDOH — SOCIAL STABILITY: SOCIAL INSECURITY: DOES ANYONE TRY TO KEEP YOU FROM HAVING/CONTACTING OTHER FRIENDS OR DOING THINGS OUTSIDE YOUR HOME?: NO

## 2024-10-01 SDOH — SOCIAL STABILITY: SOCIAL INSECURITY: ARE YOU OR HAVE YOU BEEN THREATENED OR ABUSED PHYSICALLY, EMOTIONALLY, OR SEXUALLY BY ANYONE?: NO

## 2024-10-01 SDOH — SOCIAL STABILITY: SOCIAL INSECURITY: HAVE YOU HAD ANY THOUGHTS OF HARMING ANYONE ELSE?: NO

## 2024-10-01 SDOH — SOCIAL STABILITY: SOCIAL INSECURITY: HAS ANYONE EVER THREATENED TO HURT YOUR FAMILY OR YOUR PETS?: NO

## 2024-10-01 SDOH — SOCIAL STABILITY: SOCIAL INSECURITY: ABUSE: ADULT

## 2024-10-01 SDOH — SOCIAL STABILITY: SOCIAL INSECURITY: HAVE YOU HAD THOUGHTS OF HARMING ANYONE ELSE?: YES

## 2024-10-01 ASSESSMENT — COGNITIVE AND FUNCTIONAL STATUS - GENERAL
MOBILITY SCORE: 24
DAILY ACTIVITIY SCORE: 24
DAILY ACTIVITIY SCORE: 24
MOBILITY SCORE: 24

## 2024-10-01 ASSESSMENT — ACTIVITIES OF DAILY LIVING (ADL)
BATHING: INDEPENDENT
FEEDING YOURSELF: INDEPENDENT
ADEQUATE_TO_COMPLETE_ADL: YES
DRESSING YOURSELF: INDEPENDENT
HEARING - RIGHT EAR: FUNCTIONAL
PATIENT'S MEMORY ADEQUATE TO SAFELY COMPLETE DAILY ACTIVITIES?: YES
GROOMING: INDEPENDENT
JUDGMENT_ADEQUATE_SAFELY_COMPLETE_DAILY_ACTIVITIES: YES
WALKS IN HOME: INDEPENDENT
TOILETING: INDEPENDENT
HEARING - LEFT EAR: FUNCTIONAL

## 2024-10-01 ASSESSMENT — LIFESTYLE VARIABLES
HOW OFTEN DO YOU HAVE A DRINK CONTAINING ALCOHOL: MONTHLY OR LESS
AUDIT-C TOTAL SCORE: 1
HOW OFTEN DO YOU HAVE 6 OR MORE DRINKS ON ONE OCCASION: NEVER
SKIP TO QUESTIONS 9-10: 1
AUDIT-C TOTAL SCORE: 1
HOW MANY STANDARD DRINKS CONTAINING ALCOHOL DO YOU HAVE ON A TYPICAL DAY: 1 OR 2

## 2024-10-01 ASSESSMENT — PAIN SCALES - GENERAL
PAINLEVEL_OUTOF10: 0 - NO PAIN
PAINLEVEL_OUTOF10: 0 - NO PAIN

## 2024-10-01 ASSESSMENT — COLUMBIA-SUICIDE SEVERITY RATING SCALE - C-SSRS
1. IN THE PAST MONTH, HAVE YOU WISHED YOU WERE DEAD OR WISHED YOU COULD GO TO SLEEP AND NOT WAKE UP?: NO
2. HAVE YOU ACTUALLY HAD ANY THOUGHTS OF KILLING YOURSELF?: NO
6. HAVE YOU EVER DONE ANYTHING, STARTED TO DO ANYTHING, OR PREPARED TO DO ANYTHING TO END YOUR LIFE?: NO

## 2024-10-01 ASSESSMENT — PAIN SCALES - WONG BAKER: WONGBAKER_NUMERICALRESPONSE: NO HURT

## 2024-10-01 ASSESSMENT — PAIN - FUNCTIONAL ASSESSMENT: PAIN_FUNCTIONAL_ASSESSMENT: 0-10

## 2024-10-01 NOTE — H&P
History Of Present Illness  Tom Haas is a 79 y.o. male presenting with HFrEF.    HTN (hx hypertensive emergency)  Hydronephrosis, CKD stage IV-V   Carotid Stenosis  CAD - [facility] '05 (Lcx 80-90%, LAD 50-60%, RCA 40%)   Cardiomyopathy / HFrEF -         TESTING     -ECHO/ TTE:  (6/12/24) LVEF 20-25%.  LV mod-sev dilated and global hypokinesis.   Mild-mod MR.  LVIDd 5.14 cm.    -ECHO/ TTE:  (3/11/24) LVEF 30-35%.  LV abn pattern diastolic filling and global hypokinesis. Sev LAE and mild-mod JOY.  LVIDd 5cm.             Past Medical History  Past Medical History:   Diagnosis Date    Chronic kidney disease     Heart disease     Hypertension     Occlusion and stenosis of unspecified carotid artery     Carotid atherosclerosis    Other conditions influencing health status     Coronary Artery Disease    Personal history of other diseases of the circulatory system     History of congestive heart disease    Personal history of other diseases of the circulatory system     History of hypertension    Personal history of other endocrine, nutritional and metabolic disease     History of hyperlipidemia       Surgical History  No past surgical history on file.     Social History  He reports that he has never smoked. He has never used smokeless tobacco. He reports current alcohol use. He reports that he does not use drugs.    Family History  No family history on file.     Allergies  Patient has no known allergies.    Review of Systems   All other systems reviewed and are negative.       Physical Exam  Vitals reviewed.   Constitutional:       General: He is not in acute distress.     Appearance: Normal appearance. He is normal weight.   HENT:      Head: Normocephalic and atraumatic.      Nose: Nose normal. No congestion or rhinorrhea.      Mouth/Throat:      Mouth: Mucous membranes are moist.      Pharynx: Oropharynx is clear. No oropharyngeal exudate or posterior oropharyngeal erythema.   Eyes:      Extraocular Movements: Extraocular  movements intact.      Conjunctiva/sclera: Conjunctivae normal.      Pupils: Pupils are equal, round, and reactive to light.   Neck:      Vascular: No carotid bruit.   Cardiovascular:      Rate and Rhythm: Normal rate and regular rhythm.      Pulses: Normal pulses.      Heart sounds: Normal heart sounds. No murmur heard.     No friction rub. No gallop.   Pulmonary:      Effort: Pulmonary effort is normal. No respiratory distress.      Breath sounds: Normal breath sounds. No wheezing or rales.   Abdominal:      General: Abdomen is flat. Bowel sounds are normal. There is no distension.      Palpations: Abdomen is soft.      Tenderness: There is no abdominal tenderness.   Musculoskeletal:         General: No swelling. Normal range of motion.      Cervical back: Normal range of motion.   Lymphadenopathy:      Cervical: No cervical adenopathy.   Skin:     General: Skin is warm and dry.      Capillary Refill: Capillary refill takes less than 2 seconds.      Findings: No erythema, lesion or rash.   Neurological:      General: No focal deficit present.      Mental Status: He is alert and oriented to person, place, and time. Mental status is at baseline.   Psychiatric:         Mood and Affect: Mood normal.         Behavior: Behavior normal.         Thought Content: Thought content normal.         Judgment: Judgment normal.     Asa iii  Malampati iii      Last Recorded Vitals  There were no vitals taken for this visit.    Relevant Results        Results reviewed      Assessment/Plan   Assessment & Plan      HFrEF, ICM  DC ICD, mod sedation        I spent 30 minutes in the professional and overall care of this patient.      Addi Ahumada MD

## 2024-10-01 NOTE — CARE PLAN
The patient's goals for the shift include      The clinical goals for the shift include HDS    Pt admitted to LT5

## 2024-10-01 NOTE — Clinical Note
The DEFIBRILLATOR, ICD, DUAL CHAMBER, GALLANT DR - [id number] device was inserted. The leads were placed into the connector and visually verified to be in correct position. Injury current obtained.

## 2024-10-01 NOTE — PROGRESS NOTES
Pharmacy Medication History Review    Tom Haas is a 79 y.o. male admitted for No Principal Problem: There is no principal problem currently on the Problem List. Please update the Problem List and refresh.. Pharmacy reviewed the patient's [id number] medications and allergies for accuracy.    Medications ADDED:  NONE  Medications CHANGED:  NONE  Medications REMOVED:   NONE     The list below reflects the updated PTA list.   Prior to Admission Medications   Prescriptions Last Dose Informant   aspirin 81 mg chewable tablet 9/30/2024 Self   Sig: Chew 1 tablet (81 mg) once daily. Do not start before March 14, 2024.   atorvastatin (Lipitor) 20 mg tablet 9/30/2024 Self   Sig: Take 1 tablet (20 mg) by mouth once daily at bedtime.   carvedilol (Coreg) 6.25 mg tablet 10/1/2024 Self   Sig: Take 1 tablet (6.25 mg) by mouth 2 times a day.   cholecalciferol (Vitamin D-3) 50 MCG (2000 UT) tablet 10/1/2024 Self   Sig: Take 1 tablet (2,000 Units) by mouth once daily. Do not start before March 14, 2024.   ferrous sulfate 324 mg (65 mg elemental iron) EC tablet (delayed release) 9/30/2024 Self   Sig: Take 1 tablet (65 mg) by mouth once daily with breakfast.   furosemide (Lasix) 80 mg tablet 9/30/2024 Self   Sig: Take 1 tablet (80 mg) by mouth once daily.   hydrALAZINE (Apresoline) 50 mg tablet 10/1/2024 Self   Sig: Take 1 tablet (50 mg) by mouth 2 times a day.   isosorbide mononitrate ER (Imdur) 30 mg 24 hr tablet 10/1/2024 Self   Sig: Take 1 tablet (30 mg) by mouth once daily. Do not crush or chew.   sodium bicarbonate 650 mg tablet 10/1/2024 Self   Sig: Take 0.5 tablets (325 mg) by mouth 4 times a day.   Patient taking differently: Take 0.5 tablets (325 mg) by mouth 2 times a day.      Facility-Administered Medications: None        The list below reflects the updated allergy list. Please review each documented allergy for additional clarification and justification.  Allergies  Reviewed by Nelda Mcguire RN on 10/1/2024  "  No Known Allergies         Patient declines M2B at discharge.     Sources:   out patient fill history, OARRS, and patient interview  Office visit cardiology 7/10/24   Kelvin Murphy MD     Additional Comments:  The patient was a great historian who knew medications and dosages well.       Elías Hill, [facility]  Transitions of Care Pharmacist  10/01/24     Secure Chat preferred   If no response call [phone] or Vocera \"Med Rec\"   "

## 2024-10-01 NOTE — Clinical Note
Patient Clipped and Prepped: left chest. Prepped with ChloraPrep, a minimum of 3 minute dry time, longer if needed, no pooling noted, patient draped in sterile fashion and Duraprep x1.

## 2024-10-02 ENCOUNTER — APPOINTMENT (OUTPATIENT)
Dept: CARDIOLOGY | Facility: HOSPITAL | Age: 79
End: 2024-10-02
Payer: MEDICARE

## 2024-10-02 ENCOUNTER — APPOINTMENT (OUTPATIENT)
Dept: RADIOLOGY | Facility: HOSPITAL | Age: 79
End: 2024-10-02
Payer: MEDICARE

## 2024-10-02 VITALS
BODY MASS INDEX: 27.36 KG/M2 | DIASTOLIC BLOOD PRESSURE: 96 MMHG | HEART RATE: 97 BPM | SYSTOLIC BLOOD PRESSURE: 153 MMHG | HEIGHT: 65 IN | TEMPERATURE: 98.2 F | OXYGEN SATURATION: 95 % | WEIGHT: 164.24 LBS | RESPIRATION RATE: 16 BRPM

## 2024-10-02 PROCEDURE — 71046 X-RAY EXAM CHEST 2 VIEWS: CPT | Performed by: RADIOLOGY

## 2024-10-02 PROCEDURE — 71046 X-RAY EXAM CHEST 2 VIEWS: CPT

## 2024-10-02 PROCEDURE — G0378 HOSPITAL OBSERVATION PER HR: HCPCS

## 2024-10-02 ASSESSMENT — COGNITIVE AND FUNCTIONAL STATUS - GENERAL
DAILY ACTIVITIY SCORE: 24
MOBILITY SCORE: 24

## 2024-10-02 ASSESSMENT — PAIN SCALES - GENERAL: PAINLEVEL_OUTOF10: 0 - NO PAIN

## 2024-10-02 ASSESSMENT — PAIN - FUNCTIONAL ASSESSMENT: PAIN_FUNCTIONAL_ASSESSMENT: 0-10

## 2024-10-02 NOTE — NURSING NOTE
Patient given discharge paper work and educated on paper work and new ICD placement. IV taken out.

## 2024-10-02 NOTE — CARE PLAN
The patient's goals for the shift include      The clinical goals for the shift include pt will be safe throughout shift

## 2024-10-02 NOTE — DISCHARGE INSTRUCTIONS
Discharge Instructions for your Cardiac Device   CARDIAC DEVICE CLINIC  [phone]              Incision:   1.  Keep your incision clean and dry for 1 week.  2. May shower 7 days after the procedure. Do not submerge the incision in a tub, pool, hot tub, or lake for 4 weeks.  3. Your incision should look better each day. If you notice unusual swelling, redness, drainage or fever greater than 100 degrees, please call the Device Clinic or your Doctor's office.  4. Avoid using deodorants, powders, creams, lotions, etc on your incision for 4 weeks.   5. There are no stitches to be removed.  If you received a “glue” closure this may appear purple-gray and does not get removed but wears away slowly on its own.  Steri-strips (small white bandages) may be removed in one week or they may fall off on their own earlier.  Pain:  1. It is normal for the area around the incision to be tender for a few weeks following surgery. Pain relievers such as Tylenol or Motrin (whichever you can take) are usually sufficient for pain relief. If the pain gets worse instead of better than please call the Device Clinic or your Doctor.  Activity:  1. If you have a new device and new leads placed than avoid raising your arm above shoulder level for 4 weeks. Do no  anything weighing more than 15 pounds.   2. Avoid exercising with the arm on the side of your pacemaker.  So no golf, swimming, tennis, bowling etc for 4 weeks.      3. Driving: If you were driving prior to your procedure, you may resume driving in 1 week. If you experienced a loss of consciousness prior to your procedure, you should verify with your Doctor when you are able to resume driving.  ID CARD:  1. It is important to carry your device ID card with you at all times.   2. Inform doctors and health care providers that you have a pacemaker or defibrillator.  Electromagnetic Interference:  1. Microwave ovens are safe to use.  2. Cellular phones should be held to the  opposite ear from your device. Do not carry your phone in your shirt pocket. Some i-phones that self-charge can interfere with your device so be sure to keep it away from your pacemaker/defibrillator.   3. Read the Patient Booklet for more information. You may call either the Device Clinic [phone]  or the patient services of the device  with questions about specific electrical appliances and interference problems.    IT IS YOUR RESPONSIBILITY TO MAKE AND KEEP APPOINTMENTS.   Please refer to your Device clinic handout.

## 2024-10-02 NOTE — DISCHARGE SUMMARY
Discharge Diagnosis  HFrEF (heart failure with reduced ejection fraction)    Issues Requiring Follow-Up  Device clinic - scheduled  Dr. Power follow up - scheduled     Test Results Pending At Discharge  Pending Labs       No current pending labs.          Hospital Course   Tom Haas is a 79 y.o. male with pmhx notable for icm/hfref who underwent dc icd yesterday. Today is POD1.   -CXR portable reveals normal lead position, stable device position with no acute abnormalities   -CXR PA/LAT reveals normal lead position, stable device position with no acute abnormalities   -Device interrogation reveals normal lead parameters and device function    -physical exam  demonstrates wound that is well-apposed with expect post-surgical changes   -anticoagulation: none indicated  -continue all other cardiac medications as ordered      Pertinent Physical Exam At Time of Discharge  Physical Exam  Vitals reviewed.   Constitutional:       General: He is not in acute distress.     Appearance: Normal appearance. He is normal weight.   HENT:      Head: Normocephalic and atraumatic.      Nose: Nose normal. No congestion or rhinorrhea.      Mouth/Throat:      Mouth: Mucous membranes are moist.      Pharynx: Oropharynx is clear. No oropharyngeal exudate or posterior oropharyngeal erythema.   Eyes:      Extraocular Movements: Extraocular movements intact.      Conjunctiva/sclera: Conjunctivae normal.      Pupils: Pupils are equal, round, and reactive to light.   Neck:      Vascular: No carotid bruit.   Cardiovascular:      Rate and Rhythm: Normal rate and regular rhythm.      Pulses: Normal pulses.      Heart sounds: Normal heart sounds. No murmur heard.     No friction rub. No gallop.      Comments: L upper chest with palpable device with mild tenderness to palpation. Incision clean/dry/intact. No discharge/erythema/induration.      Pulmonary:      Effort: Pulmonary effort is normal. No respiratory distress.      Breath sounds:  Normal breath sounds. No wheezing or rales.   Abdominal:      General: Abdomen is flat. Bowel sounds are normal. There is no distension.      Palpations: Abdomen is soft.      Tenderness: There is no abdominal tenderness.   Musculoskeletal:         General: No swelling. Normal range of motion.      Cervical back: Normal range of motion.   Lymphadenopathy:      Cervical: No cervical adenopathy.   Skin:     General: Skin is warm and dry.      Capillary Refill: Capillary refill takes less than 2 seconds.      Findings: No erythema, lesion or rash.   Neurological:      General: No focal deficit present.      Mental Status: He is alert and oriented to person, place, and time. Mental status is at baseline.   Psychiatric:         Mood and Affect: Mood normal.         Behavior: Behavior normal.         Thought Content: Thought content normal.         Judgment: Judgment normal.         Home Medications     Medication List      CONTINUE taking these medications     aspirin 81 mg chewable tablet; Chew 1 tablet (81 mg) once daily. Do not   start before March 14, 2024.   atorvastatin 20 mg tablet; Commonly known as: Lipitor; Take 1 tablet (20   mg) by mouth once daily at bedtime.   carvedilol 6.25 mg tablet; Commonly known as: Coreg; Take 1 tablet (6.25   mg) by mouth 2 times a day.   cholecalciferol 50 MCG (2000 UT) tablet; Commonly known as: Vitamin D-3;   Take 1 tablet (2,000 Units) by mouth once daily. Do not start before March 14, 2024.   ferrous sulfate 324 mg (65 mg elemental iron) EC tablet (delayed   release); Take 1 tablet (65 mg) by mouth once daily with breakfast.   furosemide 80 mg tablet; Commonly known as: Lasix; Take 1 tablet (80 mg)   by mouth once daily.   hydrALAZINE 50 mg tablet; Commonly known as: Apresoline; Take 1 tablet   (50 mg) by mouth 2 times a day.   isosorbide mononitrate ER 30 mg 24 hr tablet; Commonly known as: Imdur;   Take 1 tablet (30 mg) by mouth once daily. Do not crush or chew.   sodium  bicarbonate 650 mg tablet; Take 0.5 tablets (325 mg) by mouth 4   times a day.       Outpatient Follow-Up  Future Appointments   Date Time Provider Department Center   10/30/2024 10:15 AM Mansoor Hinkle MD [id number] [facility]   11/8/2024  8:30 AM KENDRA RODRIGUEZ CARDIAC DEVICE CLINIC [facility]       Addi Ahumada MD

## 2024-10-10 DIAGNOSIS — I50.20 HFREF (HEART FAILURE WITH REDUCED EJECTION FRACTION): Primary | ICD-10-CM

## 2024-10-19 NOTE — PROGRESS NOTES
"[facility]          Admitting Provider: Jerson Triplett MD Admission Date: 3/10/2024.   Attending Provider: Jerson Triplett MD MRN: [id number]       Subjective   Tom Haas is a 78 y.o. male on day 0 of admission presenting with Hypertensive urgency.  Interval History no complaints.     Objective   Physical Exam  Last Recorded Vitals: Blood pressure (!) 154/97, pulse 97, temperature 36.7 °C (98.1 °F), resp. rate 19, height 1.651 m (5' 5\"), weight 72.6 kg (160 lb), SpO2 97 %.  Patient Vitals for the past 24 hrs:   BP Temp Pulse Resp SpO2 Height Weight   03/11/24 0700 (!) 154/97 36.7 °C (98.1 °F) 97 19 97 % -- --   03/11/24 0300 144/84 36.8 °C (98.2 °F) 89 20 96 % -- --   03/10/24 2300 (!) 149/101 36.6 °C (97.9 °F) 98 22 97 % -- --   03/10/24 2015 138/78 -- 96 (!) 24 99 % -- --   03/10/24 1815 (!) 140/99 -- (!) 105 (!) 21 100 % -- --   03/10/24 1800 (!) 143/100 -- (!) 106 17 100 % -- --   03/10/24 1745 (!) 157/116 -- (!) 107 (!) 26 100 % -- --   03/10/24 1715 (!) 203/93 -- (!) 103 20 99 % -- --   03/10/24 1700 (!) 179/132 -- 93 (!) 21 99 % -- --   03/10/24 1530 (!) 181/102 -- (!) 101 19 99 % -- --   03/10/24 1500 (!) 198/108 -- (!) 106 (!) 22 98 % -- --   03/10/24 1445 (!) 198/108 -- (!) 116 (!) 21 98 % -- --   03/10/24 1401 (!) 200/102 -- (!) 104 19 98 % -- --   03/10/24 1322 (!) 207/114 36.5 °C (97.7 °F) (!) 115 20 95 % 1.651 m (5' 5\") 72.6 kg (160 lb)     Body mass index is 26.63 kg/m².  GENERAL: alert, cooperative, or no distress  SKIN: no rashes  NECK: supple, no thyromegaly, JVP within normal limits  LUNGS:  not in respiratory distress, respiratory rate normal, clear to auscultation  CARDIAC: regular rate and rhythm, normal S1 and S2, no murmur, rub, or gallop  ABDOMEN: Soft, non-tender, normal bowel sounds; no bruits, organomegaly or masses.  EXTREMITIES: No edema  NEURO: Alert and oriented x 3 , gait normal ., reflexes normal and symmetric, strength and  sensation grossly " 52-year-old male with a PMH HTN, HLD, CAD. Emphysema (home O2), paraplegic, hepatitis C, Indwelling catheter due to neurogenic bladder sent to the ED from [facility] for West Catheter Obstruction. Endorses leaking of urine around the west catheter, unsure when it originally started, but has worsen over the past couple days. Last catheter exchange was 2 months ago, which is normally done either at the [facility] or [facility]. Denies any other acute complaints. Labs unremarkable for H/H: 11.6/38.0, UA (+) Nitrite, Large-leuk Esterase, too many-WBC, Many-Bacteria. Vitals stable. Patient is being admitted having a Suprapubic catheter place by IR.         #Obstruction of urinary catheter  Chronic Indwelling Catheter - Last exchange 2 months ago    Evaluated by Urology - Unable to exchange the catheter  - Suprapubic catheter to be placed by IR.   - f/u CT A/P    #UTI  UA (+) Nitrite, Large-leuk Esterase, too many-WBC, Many-Bacteria  Patient refused IV placement intially  - Bactrim   - F/U Urine Cx   - DVT prophylaxis    #Chronic pain/neuropathy   - c/w gabapentin, cymbalta, dilaudid (home med)      #H/o Opioid abuse  #Bipolar disorder   Methadone dose verified with Ana Hood. Pt is on Methadone 110mg q daily, last fill date 9/27 for 1 month supply. Last intake of med prior to hospitalization was 10/18 AM.   - c/w seroquel, methadone, cymbalta     Neurogenic bladder   - plan for SPC as above  - c/w flomax         DVT ppx - SQH           normal  Intake/Output last 3 Shifts:  I/O last 3 completed shifts:  In: 873.3 (12 mL/kg) [P.O.:240; I.V.:333.3 (4.6 mL/kg); IV Piggyback:300]  Out: 850 (11.7 mL/kg) [Urine:850 (0.3 mL/kg/hr)]  Weight: 72.6 kg   DATA:   Diagnostic tests reviewed for today's visit:    Most recent Labs  Results for orders placed or performed during the hospital encounter of 03/10/24 (from the past 24 hour(s))   ECG 12 lead   Result Value Ref Range    Ventricular Rate 115 BPM    Atrial Rate 115 BPM    MI Interval 182 ms    QRS Duration 112 ms    QT Interval 328 ms    QTC Calculation(Bazett) 453 ms    P Axis 54 degrees    R Axis -25 degrees    T Axis 152 degrees    QRS Count 19 beats    Q Onset 212 ms    P Onset 121 ms    P Offset 167 ms    T Offset 376 ms    QTC Fredericia 407 ms   CBC with Differential   Result Value Ref Range    WBC 10.2 4.4 - 11.3 x10*3/uL    nRBC 0.0 0.0 - 0.0 /100 WBCs    RBC 4.46 (L) 4.50 - 5.90 x10*6/uL    Hemoglobin 12.7 (L) 13.5 - 17.5 g/dL    Hematocrit 39.8 (L) 41.0 - 52.0 %    MCV 89 80 - 100 fL    MCH 28.5 26.0 - 34.0 pg    MCHC 31.9 (L) 32.0 - 36.0 g/dL    RDW 14.0 11.5 - 14.5 %    Platelets 194 150 - 450 x10*3/uL    Neutrophils % 81.8 40.0 - 80.0 %    Immature Granulocytes %, Automated 0.3 0.0 - 0.9 %    Lymphocytes % 10.3 13.0 - 44.0 %    Monocytes % 6.0 2.0 - 10.0 %    Eosinophils % 1.3 0.0 - 6.0 %    Basophils % 0.3 0.0 - 2.0 %    Neutrophils Absolute 8.30 (H) 1.60 - 5.50 x10*3/uL    Immature Granulocytes Absolute, Automated 0.03 0.00 - 0.50 x10*3/uL    Lymphocytes Absolute 1.05 0.80 - 3.00 x10*3/uL    Monocytes Absolute 0.61 0.05 - 0.80 x10*3/uL    Eosinophils Absolute 0.13 0.00 - 0.40 x10*3/uL    Basophils Absolute 0.03 0.00 - 0.10 x10*3/uL   Comprehensive Metabolic Panel   Result Value Ref Range    Glucose 124 (H) 74 - 99 mg/dL    Sodium 134 (L) 136 - 145 mmol/L    Potassium 4.5 3.5 - 5.3 mmol/L    Chloride 100 98 - 107 mmol/L    Bicarbonate 19 (L) 21 - 32 mmol/L    Anion Gap 20 10 - 20 mmol/L     Urea Nitrogen 62 (H) 6 - 23 mg/dL    Creatinine 3.35 (H) 0.50 - 1.30 mg/dL    eGFR 18 (L) >60 mL/min/1.73m*2    Calcium 8.6 8.6 - 10.3 mg/dL    Albumin 4.3 3.4 - 5.0 g/dL    Alkaline Phosphatase 41 33 - 136 U/L    Total Protein 7.9 6.4 - 8.2 g/dL    AST 23 9 - 39 U/L    Bilirubin, Total 0.4 0.0 - 1.2 mg/dL    ALT 18 10 - 52 U/L   Brain Natriuretic Peptide   Result Value Ref Range    BNP 2,432 (H) 0 - 99 pg/mL   Troponin I, High Sensitivity   Result Value Ref Range    Troponin I, High Sensitivity 3,071 (HH) 0 - 20 ng/L   Troponin I, High Sensitivity   Result Value Ref Range    Troponin I, High Sensitivity 3,111 (HH) 0 - 20 ng/L   Lactate   Result Value Ref Range    Lactate 1.9 0.4 - 2.0 mmol/L   Blood Culture    Specimen: Peripheral Venipuncture; Blood culture   Result Value Ref Range    Blood Culture Loaded on Instrument - Culture in progress    Blood Culture    Specimen: Peripheral Venipuncture; Blood culture   Result Value Ref Range    Blood Culture Loaded on Instrument - Culture in progress    Urinalysis with Reflex Culture and Microscopic   Result Value Ref Range    Color, Urine Straw Straw, Yellow    Appearance, Urine Clear Clear    Specific Gravity, Urine 1.009 1.005 - 1.035    pH, Urine 6.0 5.0, 5.5, 6.0, 6.5, 7.0, 7.5, 8.0    Protein, Urine 100 (2+) (N) NEGATIVE mg/dL    Glucose, Urine NEGATIVE NEGATIVE mg/dL    Blood, Urine SMALL (1+) (A) NEGATIVE    Ketones, Urine NEGATIVE NEGATIVE mg/dL    Bilirubin, Urine NEGATIVE NEGATIVE    Urobilinogen, Urine <2.0 <2.0 mg/dL    Nitrite, Urine NEGATIVE NEGATIVE    Leukocyte Esterase, Urine NEGATIVE NEGATIVE   Extra Urine Gray Tube   Result Value Ref Range    Extra Tube Hold for add-ons.    Urinalysis Microscopic   Result Value Ref Range    WBC, Urine 1-5 1-5, NONE /HPF    RBC, Urine 3-5 NONE, 1-2, 3-5 /HPF    Bacteria, Urine 1+ (A) NONE SEEN /HPF    Mucus, Urine 1+ Reference range not established. /LPF   Urine electrolytes   Result Value Ref Range    Sodium, Urine  "Random 83 mmol/L    Sodium/Creatinine Ratio 279 Not established. mmol/g Creat    Potassium, Urine Random 29 mmol/L    Potassium/Creatinine Ratio 97 Not established mmol/g Creat    Chloride, Urine Random 97 mmol/L    Chloride/Creatinine Ratio 326 (H) 23 - 275 mmol/g creat    Creatinine, Urine Random 29.8 20.0 - 370.0 mg/dL   Lipid Panel   Result Value Ref Range    Cholesterol 173 0 - 199 mg/dL    HDL-Cholesterol 38.1 mg/dL    Cholesterol/HDL Ratio 4.5     LDL Calculated 113 (H) <=99 mg/dL    VLDL 22 0 - 40 mg/dL    Triglycerides 108 0 - 149 mg/dL    Non HDL Cholesterol 135 0 - 149 mg/dL   Basic Metabolic Panel   Result Value Ref Range    Glucose 88 74 - 99 mg/dL    Sodium 133 (L) 136 - 145 mmol/L    Potassium 4.3 3.5 - 5.3 mmol/L    Chloride 102 98 - 107 mmol/L    Bicarbonate 20 (L) 21 - 32 mmol/L    Anion Gap 15 10 - 20 mmol/L    Urea Nitrogen 63 (H) 6 - 23 mg/dL    Creatinine 3.58 (H) 0.50 - 1.30 mg/dL    eGFR 17 (L) >60 mL/min/1.73m*2    Calcium 7.9 (L) 8.6 - 10.3 mg/dL     Blood Culture   Date Value Ref Range Status   03/10/2024 Loaded on Instrument - Culture in progress  Preliminary   03/10/2024 Loaded on Instrument - Culture in progress  Preliminary    No results found for: \"RESPCULTSM\" No results found for: \"PERDIAFLDCUL\" No results found for: \"STERFLDCULSM\"   ECG 12 lead    Result Date: 3/11/2024  Sinus tachycardia with Premature atrial complexes Left ventricular hypertrophy with repolarization abnormality ( R in aVL , Rui product ) Abnormal ECG When compared with ECG of 12-NOV-2012 10:59, Premature atrial complexes are now Present Vent. rate has increased BY  48 BPM QRS axis Shifted left ST elevation now present in Anterior leads ST now depressed in Lateral leads T wave inversion now evident in Lateral leads    CT chest wo IV contrast    Result Date: 3/10/2024  Interpreted By:  Frandy Sutton, STUDY: CT CHEST WO IV CONTRAST;  3/10/2024 7:15 pm   INDICATION: Signs/Symptoms:pleural effusion.   COMPARISON: " None.   ACCESSION NUMBER(S): [id number]   ORDERING CLINICIAN: PHAM ROMERO   TECHNIQUE: Contiguous axial images of the chest were obtained without intravenous contrast. Coronal and sagittal reformatted images were obtained from the axial images.   FINDINGS: The examination is limited secondary of intravenous contrast.   No axillary lymphadenopathy. The AP window lymph nodes measure up to 10 mm. Paratracheal lymph nodes measure up to 15 mm. 14 mm and 19 mm subcarinal lymph nodes. There is limited evaluation for hilar lymphadenopathy on noncontrast examination.   There is cardiomegaly. Coronary artery calcifications. No significant pericardial effusion.   There is large right and small left pleural effusion and mild bibasilar atelectatic/consolidative opacities. 6 mm left lower lobe pulmonary nodule. No pneumothorax.   Limited evaluation of the upper abdomen. Severe cortical thinning and hydronephrosis partially imaged right kidney. Partially imaged left renal cysts.   Multilevel degenerative change of the thoracic spine.       Large right and small left pleural effusion and mild bibasilar atelectatic/consolidative opacities.   6 mm left lower lobe pulmonary nodule; follow-up CT chest is recommended in 6 months to assess stability.   MACRO: None   Signed by: Frandy Sutton 3/10/2024 7:37 PM Dictation workstation:   [id number]    US renal complete    Result Date: 3/10/2024  Interpreted By:  Frandy Sutton, STUDY: US RENAL COMPLETE;  3/10/2024 5:52 pm   INDICATION: Signs/Symptoms:ckd.   COMPARISON: None.   ACCESSION NUMBER(S): [id number]   ORDERING CLINICIAN: PHAM ROMERO   TECHNIQUE: Multiple sonographic grayscale and color images of the kidneys were performed.   FINDINGS: The examination is limited secondary to patient body habitus.   The right kidney measures 8.6 cm in length. There is severe cortical thinning of the right kidney and right hydronephrosis.   The left kidney measures 13.4 cm in length.  There are multiple left renal cysts with the largest cyst measuring 2.9 cm. A 2 cm cyst in the left kidney demonstrates thin internal septation. There is no evidence of significant left hydronephrosis.   The urinary bladder is underdistended and not well evaluated. There is prostatomegaly resulting in posterior mass effect on the urinary bladder.       Severe cortical thinning of right kidney and right hydronephrosis. CT may be obtained to better assess the renal anatomy.   Multiple left renal cysts.   Prostatomegaly resulting in posterior mass effect on the urinary bladder; please clinically correlate with PSA.   MACRO: None   Signed by: Frandy Sutton 3/10/2024 6:45 PM Dictation workstation:   [id number]    XR chest 2 views    Result Date: 3/10/2024  Interpreted By:  Tre Bronson, STUDY: XR CHEST 2 VIEWS;  3/10/2024 1:46 pm   INDICATION: Signs/Symptoms:SOB.   COMPARISON: None.   ACCESSION NUMBER(S): [id number]   ORDERING CLINICIAN: DWAIN SIBLEY   FINDINGS:     CARDIOMEDIASTINAL SILHOUETTE: Cardiomediastinal silhouette is unchanged.   LUNGS: Right basilar airspace opacification. No pneumothorax. Small right-sided effusion.   ABDOMEN: No remarkable upper abdominal findings.   BONES: No acute osseous changes.   Remaining findings appear stable since prior comparison radiograph.       1.  Right basilar airspace opacification which may reflect pneumonia in the appropriate clinical setting     Signed by: Tre Bronson 3/10/2024 2:01 PM Dictation workstation:   [id number]    Current Facility-Administered Medications   Medication Dose Route Frequency Provider Last Rate Last Admin    aspirin chewable tablet 81 mg  81 mg oral Daily Jerson Triplett MD   81 mg at 03/10/24 1811    azithromycin (Zithromax) in dextrose 5 % in water (D5W) 250 mL  mg  500 mg intravenous q24h Jerson Triplett MD        cefTRIAXone (Rocephin) in dextrose 5 % water (D5W) 50 mL IV 2 g  2 g intravenous q24h Jerson Triplett MD         enoxaparin (Lovenox) syringe 30 mg  30 mg subcutaneous q24h Jerson Triplett MD   30 mg at 03/10/24 1640    hydrALAZINE (Apresoline) injection 5 mg  5 mg intravenous q4h PRN Jerson Triplett MD   5 mg at 03/10/24 1612    perflutren lipid microspheres (Definity) injection 0.5-10 mL of dilution  0.5-10 mL of dilution intravenous Once in imaging Jerson Triplett MD        perflutren protein A microsphere (Optison) injection 0.5 mL  0.5 mL intravenous Once in imaging Jerson Triplett MD        polyethylene glycol (Glycolax, Miralax) packet 17 g  17 g oral Daily Jerson Triplett MD        sodium chloride 0.9% infusion  25 mL/hr intravenous Continuous Cruz Durham MD 25 mL/hr at 03/11/24 0006 25 mL/hr at 03/11/24 0006    sulfur hexafluoride microsphr (Lumason) injection 24.28 mg  2 mL intravenous Once in imaging Jerson Triplett MD         No current outpatient medications on file.   ..     Medication and Non-Pharmacologic VTE Prophylaxis/Anticoagulants      Last Anticoag Admin            enoxaparin (Lovenox) syringe 30 mg    Given 30 mg at 03/10/24 1640    Frequency: Every 24 hours         No unadministered anticoagulant orders found.          Assessment/Plan   Tom Haas has  Principal Problem:    Hypertensive urgency  Active Problems:    Non-STEMI (non-ST elevated myocardial infarction) (CMS/HCC)    Acute heart failure (CMS/HCC)    Right hydronephrosis    Chronic renal impairment    Hyponatremia    Bilateral pleural effusion    Pneumonia due to infectious organism     Adjust medications. Add hydralazine.    ASA, Statin, Cardiology consult     Lasix  Will get an Renal US  Add hydralazine  Urology consult   empiric antibiotics.     Other Hospital problems        Abnormal findings not addressed during hospitalization, but require out patient follow up. None        I spent 35 minutes talking and examining Tom Haas, reviewing the labs & medications, formulating plan of care & discussing  with NP and nursing staff.    Jerson Triplett MD

## 2024-10-30 ENCOUNTER — APPOINTMENT (OUTPATIENT)
Dept: PRIMARY CARE | Facility: CLINIC | Age: 79
End: 2024-10-30
Payer: MEDICARE

## 2024-10-30 VITALS
TEMPERATURE: 97.4 F | WEIGHT: 170 LBS | HEIGHT: 65 IN | DIASTOLIC BLOOD PRESSURE: 66 MMHG | SYSTOLIC BLOOD PRESSURE: 110 MMHG | OXYGEN SATURATION: 99 % | BODY MASS INDEX: 28.32 KG/M2 | HEART RATE: 60 BPM

## 2024-10-30 DIAGNOSIS — E78.00 PURE HYPERCHOLESTEROLEMIA: ICD-10-CM

## 2024-10-30 DIAGNOSIS — M25.562 LEFT KNEE PAIN, UNSPECIFIED CHRONICITY: ICD-10-CM

## 2024-10-30 DIAGNOSIS — N18.4 CHRONIC KIDNEY DISEASE (CKD), STAGE IV (SEVERE) (MULTI): ICD-10-CM

## 2024-10-30 DIAGNOSIS — R91.1 PULMONARY NODULE: Primary | ICD-10-CM

## 2024-10-30 DIAGNOSIS — I15.1 HYPERTENSION SECONDARY TO OTHER RENAL DISORDERS: ICD-10-CM

## 2024-10-30 PROCEDURE — 1158F ADVNC CARE PLAN TLK DOCD: CPT | Performed by: INTERNAL MEDICINE

## 2024-10-30 PROCEDURE — 1159F MED LIST DOCD IN RCRD: CPT | Performed by: INTERNAL MEDICINE

## 2024-10-30 PROCEDURE — 99214 OFFICE O/P EST MOD 30 MIN: CPT | Performed by: INTERNAL MEDICINE

## 2024-10-30 PROCEDURE — 3074F SYST BP LT 130 MM HG: CPT | Performed by: INTERNAL MEDICINE

## 2024-10-30 PROCEDURE — 1036F TOBACCO NON-USER: CPT | Performed by: INTERNAL MEDICINE

## 2024-10-30 PROCEDURE — 1123F ACP DISCUSS/DSCN MKR DOCD: CPT | Performed by: INTERNAL MEDICINE

## 2024-10-30 PROCEDURE — 1160F RVW MEDS BY RX/DR IN RCRD: CPT | Performed by: INTERNAL MEDICINE

## 2024-10-30 PROCEDURE — 3078F DIAST BP <80 MM HG: CPT | Performed by: INTERNAL MEDICINE

## 2024-10-30 NOTE — PROGRESS NOTES
"Subjective   Patient ID: Tom Haas is a 79 y.o. male who presents for Follow-up.    HPI   Patient  want to get another CT chest for follow up on pulmonary nodule.  He also has knee pain and want to see an orthopedic surgeon doctor.    He denied for any other symptoms    Chronic systolic heart failure- He is on Aspirin, Imdur, Lasix, Lipitor and Coreg. He denied for any anginal symptoms.    Review of Systems   Constitutional:  Negative for activity change, appetite change, fatigue and fever.   HENT:  Negative for congestion, dental problem, ear pain, hearing loss, rhinorrhea, sinus pressure, sinus pain, sore throat, trouble swallowing and voice change.    Eyes:  Negative for photophobia, discharge, redness and visual disturbance.   Respiratory:  Negative for cough, choking, chest tightness, shortness of breath, wheezing and stridor.    Cardiovascular:  Negative for chest pain, palpitations and leg swelling.   Gastrointestinal:  Negative for abdominal distention, abdominal pain, blood in stool, constipation, diarrhea, nausea and vomiting.   Endocrine: Negative for polydipsia, polyphagia and polyuria.   Genitourinary:  Negative for difficulty urinating, dysuria, flank pain, frequency, hematuria and urgency.   Musculoskeletal:  Positive for arthralgias. Negative for back pain, myalgias and neck stiffness.   Skin:  Negative for color change, rash and wound.   Allergic/Immunologic: Negative for immunocompromised state.   Neurological:  Negative for dizziness, seizures, syncope, facial asymmetry, speech difficulty, weakness, numbness and headaches.   Hematological:  Does not bruise/bleed easily.   Psychiatric/Behavioral:  Negative for behavioral problems, confusion, dysphoric mood, hallucinations, sleep disturbance and suicidal ideas. The patient is not nervous/anxious.      Objective   /66   Pulse 60   Temp 36.3 °C (97.4 °F)   Ht 1.651 m (5' 5\")   Wt 77.1 kg (170 lb)   SpO2 99%   BMI 28.29 kg/m² "     Physical Exam  Vitals and nursing note reviewed.   Constitutional:       General: He is not in acute distress.     Appearance: Normal appearance. He is not ill-appearing or toxic-appearing.   HENT:      Head: Normocephalic.      Nose: Nose normal.      Mouth/Throat:      Mouth: Mucous membranes are moist.   Eyes:      General: No scleral icterus.     Conjunctiva/sclera: Conjunctivae normal.      Pupils: Pupils are equal, round, and reactive to light.   Cardiovascular:      Rate and Rhythm: Normal rate and regular rhythm.      Pulses: Normal pulses.      Heart sounds: Normal heart sounds. No murmur heard.     No gallop.   Pulmonary:      Effort: Pulmonary effort is normal. No respiratory distress.      Breath sounds: Normal breath sounds. No stridor. No wheezing, rhonchi or rales.   Abdominal:      General: Bowel sounds are normal.      Tenderness: There is no abdominal tenderness.   Musculoskeletal:         General: No swelling or deformity. Normal range of motion.      Cervical back: Normal range of motion and neck supple.      Right lower leg: No edema.      Left lower leg: No edema.   Skin:     General: Skin is warm.      Coloration: Skin is not jaundiced.      Findings: No erythema.   Neurological:      General: No focal deficit present.      Mental Status: He is alert and oriented to person, place, and time.      Cranial Nerves: No cranial nerve deficit.      Motor: No weakness.      Coordination: Coordination normal.      Gait: Gait normal.   Psychiatric:         Mood and Affect: Mood normal.         Behavior: Behavior normal.         Thought Content: Thought content normal.         Judgment: Judgment normal.       Assessment/Plan   Problem List Items Addressed This Visit       Chronic kidney disease (CKD), stage IV (severe) (Multi)     Latest GFR is 18 and its been around this from past several times.         Hypertension secondary to other renal disorders     BP today is good, 110/66.  Patient is on  Hydralazine.         Pure hypercholesterolemia     He is on Atorvastatin 20 mg oral tablet daily.  Last lipid panel was checked in March 2024 and LDL was 113 with normal TG.          Other Visit Diagnoses       Pulmonary nodule    -  Primary    Relevant Orders    CT chest wo IV contrast    Left knee pain, unspecified chronicity        Relevant Orders    Referral to Orthopaedic Surgery

## 2024-11-08 ENCOUNTER — HOSPITAL ENCOUNTER (OUTPATIENT)
Dept: CARDIOLOGY | Facility: HOSPITAL | Age: 79
Discharge: HOME | End: 2024-11-08
Payer: MEDICARE

## 2024-11-08 DIAGNOSIS — I50.22 CHRONIC SYSTOLIC (CONGESTIVE) HEART FAILURE: ICD-10-CM

## 2024-11-08 PROCEDURE — 93283 PRGRMG EVAL IMPLANTABLE DFB: CPT

## 2024-11-08 PROCEDURE — 93283 PRGRMG EVAL IMPLANTABLE DFB: CPT | Performed by: INTERNAL MEDICINE

## 2024-11-08 ASSESSMENT — ENCOUNTER SYMPTOMS
ACTIVITY CHANGE: 0
CONSTIPATION: 0
SHORTNESS OF BREATH: 0
DYSPHORIC MOOD: 0
HEMATURIA: 0
ARTHRALGIAS: 1
BACK PAIN: 0
DIARRHEA: 0
SORE THROAT: 0
RHINORRHEA: 0
BRUISES/BLEEDS EASILY: 0
SINUS PRESSURE: 0
COUGH: 0
POLYDIPSIA: 0
SINUS PAIN: 0
DIZZINESS: 0
VOMITING: 0
ABDOMINAL DISTENTION: 0
WHEEZING: 0
WOUND: 0
PHOTOPHOBIA: 0
PALPITATIONS: 0
DIFFICULTY URINATING: 0
NECK STIFFNESS: 0
EYE REDNESS: 0
NAUSEA: 0
FATIGUE: 0
TROUBLE SWALLOWING: 0
VOICE CHANGE: 0
COLOR CHANGE: 0
EYE DISCHARGE: 0
BLOOD IN STOOL: 0
SPEECH DIFFICULTY: 0
CHEST TIGHTNESS: 0
NERVOUS/ANXIOUS: 0
FEVER: 0
FLANK PAIN: 0
MYALGIAS: 0
APPETITE CHANGE: 0
WEAKNESS: 0
FREQUENCY: 0
CHOKING: 0
STRIDOR: 0
CONFUSION: 0
DYSURIA: 0
HALLUCINATIONS: 0
POLYPHAGIA: 0
FACIAL ASYMMETRY: 0
HEADACHES: 0
NUMBNESS: 0
SLEEP DISTURBANCE: 0
SEIZURES: 0
ABDOMINAL PAIN: 0

## 2024-11-08 ASSESSMENT — PATIENT HEALTH QUESTIONNAIRE - PHQ9
2. FEELING DOWN, DEPRESSED OR HOPELESS: NOT AT ALL
1. LITTLE INTEREST OR PLEASURE IN DOING THINGS: NOT AT ALL
SUM OF ALL RESPONSES TO PHQ9 QUESTIONS 1 AND 2: 0

## 2024-11-08 NOTE — ASSESSMENT & PLAN NOTE
He is on Atorvastatin 20 mg oral tablet daily.  Last lipid panel was checked in March 2024 and LDL was 113 with normal TG.

## 2024-11-10 DIAGNOSIS — I50.9 ACUTE HEART FAILURE, UNSPECIFIED HEART FAILURE TYPE: ICD-10-CM

## 2024-11-11 RX ORDER — ISOSORBIDE MONONITRATE 30 MG/1
30 TABLET, EXTENDED RELEASE ORAL DAILY
Qty: 90 TABLET | Refills: 3 | Status: SHIPPED | OUTPATIENT
Start: 2024-11-11 | End: 2025-11-11

## 2024-11-13 ENCOUNTER — TELEPHONE (OUTPATIENT)
Dept: CARDIOLOGY | Facility: HOSPITAL | Age: 79
End: 2024-11-13
Payer: MEDICARE

## 2024-11-13 DIAGNOSIS — I50.22 CHRONIC SYSTOLIC HEART FAILURE: ICD-10-CM

## 2024-11-13 NOTE — TELEPHONE ENCOUNTER
Patient called for a refill on his furosemide 80 mg taken once a day. He would like it to be sent to the [facility] in [place]. Thank you.

## 2024-11-14 RX ORDER — FUROSEMIDE 80 MG/1
80 TABLET ORAL DAILY
Qty: 90 TABLET | Refills: 3 | Status: SHIPPED | OUTPATIENT
Start: 2024-11-14 | End: 2025-11-14

## 2024-11-15 ENCOUNTER — HOSPITAL ENCOUNTER (OUTPATIENT)
Dept: RADIOLOGY | Facility: HOSPITAL | Age: 79
Discharge: HOME | End: 2024-11-15
Payer: MEDICARE

## 2024-11-15 DIAGNOSIS — R91.1 PULMONARY NODULE: ICD-10-CM

## 2024-11-15 PROCEDURE — 71250 CT THORAX DX C-: CPT

## 2024-11-25 ENCOUNTER — HOSPITAL ENCOUNTER (OUTPATIENT)
Dept: CARDIOLOGY | Facility: CLINIC | Age: 79
Discharge: HOME | End: 2024-11-25
Payer: MEDICARE

## 2024-11-25 DIAGNOSIS — I50.22 CHRONIC SYSTOLIC (CONGESTIVE) HEART FAILURE: ICD-10-CM

## 2024-12-31 ENCOUNTER — HOSPITAL ENCOUNTER (OUTPATIENT)
Dept: CARDIOLOGY | Facility: CLINIC | Age: 79
Discharge: HOME | End: 2024-12-31
Payer: MEDICARE

## 2024-12-31 DIAGNOSIS — I50.22 CHRONIC SYSTOLIC (CONGESTIVE) HEART FAILURE: ICD-10-CM

## 2025-01-01 ENCOUNTER — HOSPITAL ENCOUNTER (EMERGENCY)
Facility: HOSPITAL | Age: 80
End: 2025-04-12
Attending: EMERGENCY MEDICINE
Payer: MEDICARE

## 2025-01-01 ENCOUNTER — OFFICE VISIT (OUTPATIENT)
Dept: ORTHOPEDIC SURGERY | Facility: CLINIC | Age: 80
End: 2025-01-01
Payer: MEDICARE

## 2025-01-01 VITALS — BODY MASS INDEX: 27.28 KG/M2 | WEIGHT: 180 LBS | HEIGHT: 68 IN

## 2025-01-01 DIAGNOSIS — M17.0 PRIMARY OSTEOARTHRITIS OF BOTH KNEES: Primary | ICD-10-CM

## 2025-01-01 DIAGNOSIS — N32.89 BLADDER SPASM: Primary | ICD-10-CM

## 2025-01-01 DIAGNOSIS — I46.9 CARDIAC ARREST: Primary | ICD-10-CM

## 2025-01-01 PROCEDURE — 99284 EMERGENCY DEPT VISIT MOD MDM: CPT | Mod: 25 | Performed by: EMERGENCY MEDICINE

## 2025-01-01 PROCEDURE — 2500000004 HC RX 250 GENERAL PHARMACY W/ HCPCS (ALT 636 FOR OP/ED): Performed by: EMERGENCY MEDICINE

## 2025-01-01 PROCEDURE — 1036F TOBACCO NON-USER: CPT | Performed by: FAMILY MEDICINE

## 2025-01-01 PROCEDURE — 92950 HEART/LUNG RESUSCITATION CPR: CPT | Performed by: EMERGENCY MEDICINE

## 2025-01-01 PROCEDURE — 1159F MED LIST DOCD IN RCRD: CPT | Performed by: FAMILY MEDICINE

## 2025-01-01 PROCEDURE — 99204 OFFICE O/P NEW MOD 45 MIN: CPT | Performed by: FAMILY MEDICINE

## 2025-01-01 PROCEDURE — 2500000005 HC RX 250 GENERAL PHARMACY W/O HCPCS: Performed by: EMERGENCY MEDICINE

## 2025-01-01 RX ORDER — CALCIUM CHLORIDE INJECTION 100 MG/ML
INJECTION, SOLUTION INTRAVENOUS CODE/TRAUMA/SEDATION MEDICATION
Status: COMPLETED | OUTPATIENT
Start: 2025-01-01 | End: 2025-01-01

## 2025-01-01 RX ORDER — SODIUM CHLORIDE 9 MG/ML
INJECTION, SOLUTION INTRAVENOUS
Status: COMPLETED | OUTPATIENT
Start: 2025-01-01 | End: 2025-01-01

## 2025-01-01 RX ORDER — INDOMETHACIN 25 MG/1
CAPSULE ORAL CODE/TRAUMA/SEDATION MEDICATION
Status: COMPLETED | OUTPATIENT
Start: 2025-01-01 | End: 2025-01-01

## 2025-01-01 RX ORDER — EPINEPHRINE 0.1 MG/ML
INJECTION INTRACARDIAC; INTRAVENOUS CODE/TRAUMA/SEDATION MEDICATION
Status: COMPLETED | OUTPATIENT
Start: 2025-01-01 | End: 2025-01-01

## 2025-01-01 RX ORDER — OXYBUTYNIN CHLORIDE 5 MG/1
5 TABLET, EXTENDED RELEASE ORAL DAILY
Qty: 30 TABLET | Refills: 0 | Status: SHIPPED | OUTPATIENT
Start: 2025-01-01 | End: 2025-01-01

## 2025-01-01 RX ADMIN — SODIUM BICARBONATE 50 MEQ: 84 INJECTION, SOLUTION INTRAVENOUS at 08:13

## 2025-01-01 RX ADMIN — EPINEPHRINE 1 MG: 0.1 INJECTION INTRACARDIAC; INTRAVENOUS at 08:17

## 2025-01-01 RX ADMIN — EPINEPHRINE 1 MG: 0.1 INJECTION INTRACARDIAC; INTRAVENOUS at 08:14

## 2025-01-01 RX ADMIN — CALCIUM CHLORIDE 1 G: 100 INJECTION, SOLUTION INTRAVENOUS at 08:15

## 2025-01-01 RX ADMIN — SODIUM CHLORIDE 1000 ML/HR: 900 INJECTION, SOLUTION INTRAVENOUS at 08:06

## 2025-01-01 RX ADMIN — EPINEPHRINE 1 MG: 0.1 INJECTION INTRACARDIAC; INTRAVENOUS at 08:08

## 2025-01-01 RX ADMIN — CALCIUM CHLORIDE 1 G: 100 INJECTION, SOLUTION INTRAVENOUS at 08:09

## 2025-01-01 RX ADMIN — EPINEPHRINE 1 MG: 0.1 INJECTION INTRACARDIAC; INTRAVENOUS at 08:11

## 2025-01-02 ENCOUNTER — HOSPITAL ENCOUNTER (OUTPATIENT)
Dept: RADIOLOGY | Facility: CLINIC | Age: 80
Discharge: HOME | End: 2025-01-02
Payer: MEDICARE

## 2025-01-02 ENCOUNTER — OFFICE VISIT (OUTPATIENT)
Dept: ORTHOPEDIC SURGERY | Facility: CLINIC | Age: 80
End: 2025-01-02
Payer: MEDICARE

## 2025-01-02 DIAGNOSIS — M17.12 PRIMARY OSTEOARTHRITIS OF LEFT KNEE: Primary | ICD-10-CM

## 2025-01-02 DIAGNOSIS — M25.562 LEFT KNEE PAIN, UNSPECIFIED CHRONICITY: ICD-10-CM

## 2025-01-02 PROCEDURE — 99213 OFFICE O/P EST LOW 20 MIN: CPT | Mod: 25 | Performed by: STUDENT IN AN ORGANIZED HEALTH CARE EDUCATION/TRAINING PROGRAM

## 2025-01-02 PROCEDURE — 73564 X-RAY EXAM KNEE 4 OR MORE: CPT | Mod: LEFT SIDE | Performed by: RADIOLOGY

## 2025-01-02 PROCEDURE — 73564 X-RAY EXAM KNEE 4 OR MORE: CPT | Mod: LT

## 2025-01-02 PROCEDURE — 2500000004 HC RX 250 GENERAL PHARMACY W/ HCPCS (ALT 636 FOR OP/ED): Performed by: STUDENT IN AN ORGANIZED HEALTH CARE EDUCATION/TRAINING PROGRAM

## 2025-01-02 PROCEDURE — 1159F MED LIST DOCD IN RCRD: CPT | Performed by: STUDENT IN AN ORGANIZED HEALTH CARE EDUCATION/TRAINING PROGRAM

## 2025-01-02 PROCEDURE — 99203 OFFICE O/P NEW LOW 30 MIN: CPT | Performed by: STUDENT IN AN ORGANIZED HEALTH CARE EDUCATION/TRAINING PROGRAM

## 2025-01-02 PROCEDURE — 20610 DRAIN/INJ JOINT/BURSA W/O US: CPT | Mod: LT | Performed by: STUDENT IN AN ORGANIZED HEALTH CARE EDUCATION/TRAINING PROGRAM

## 2025-01-02 RX ADMIN — TRIAMCINOLONE ACETONIDE 40 MG: 40 INJECTION, SUSPENSION INTRA-ARTICULAR; INTRAMUSCULAR at 17:00

## 2025-01-02 RX ADMIN — LIDOCAINE HYDROCHLORIDE 4 ML: 10 INJECTION, SOLUTION INFILTRATION; PERINEURAL at 17:00

## 2025-01-08 ENCOUNTER — APPOINTMENT (OUTPATIENT)
Dept: CARDIOLOGY | Facility: CLINIC | Age: 80
End: 2025-01-08
Payer: MEDICARE

## 2025-01-16 ENCOUNTER — APPOINTMENT (OUTPATIENT)
Dept: CARDIOLOGY | Facility: HOSPITAL | Age: 80
End: 2025-01-16
Payer: MEDICARE

## 2025-01-23 ENCOUNTER — LAB (OUTPATIENT)
Dept: LAB | Facility: LAB | Age: 80
End: 2025-01-23
Payer: MEDICARE

## 2025-01-23 ENCOUNTER — OFFICE VISIT (OUTPATIENT)
Dept: CARDIOLOGY | Facility: HOSPITAL | Age: 80
End: 2025-01-23
Payer: MEDICARE

## 2025-01-23 VITALS
HEART RATE: 82 BPM | OXYGEN SATURATION: 97 % | HEIGHT: 65 IN | BODY MASS INDEX: 28.09 KG/M2 | DIASTOLIC BLOOD PRESSURE: 90 MMHG | SYSTOLIC BLOOD PRESSURE: 152 MMHG | WEIGHT: 168.6 LBS

## 2025-01-23 DIAGNOSIS — N18.4 ANEMIA DUE TO STAGE 4 CHRONIC KIDNEY DISEASE (MULTI): ICD-10-CM

## 2025-01-23 DIAGNOSIS — I15.1 HYPERTENSION SECONDARY TO OTHER RENAL DISORDERS: ICD-10-CM

## 2025-01-23 DIAGNOSIS — D63.1 ANEMIA DUE TO STAGE 4 CHRONIC KIDNEY DISEASE (MULTI): ICD-10-CM

## 2025-01-23 DIAGNOSIS — N18.4 CHRONIC KIDNEY DISEASE, STAGE 4 (SEVERE) (MULTI): ICD-10-CM

## 2025-01-23 DIAGNOSIS — I35.0 NONRHEUMATIC AORTIC VALVE STENOSIS: ICD-10-CM

## 2025-01-23 DIAGNOSIS — Z45.02 ICD (IMPLANTABLE CARDIOVERTER-DEFIBRILLATOR) DISCHARGE: ICD-10-CM

## 2025-01-23 DIAGNOSIS — I25.10 CORONARY ARTERY DISEASE INVOLVING NATIVE CORONARY ARTERY OF NATIVE HEART WITHOUT ANGINA PECTORIS: ICD-10-CM

## 2025-01-23 DIAGNOSIS — I50.20 HFREF (HEART FAILURE WITH REDUCED EJECTION FRACTION): Primary | Chronic | ICD-10-CM

## 2025-01-23 LAB
ALBUMIN SERPL BCP-MCNC: 4.3 G/DL (ref 3.4–5)
ANION GAP SERPL CALC-SCNC: 16 MMOL/L (ref 10–20)
BUN SERPL-MCNC: 80 MG/DL (ref 6–23)
CALCIUM SERPL-MCNC: 9.6 MG/DL (ref 8.6–10.3)
CHLORIDE SERPL-SCNC: 101 MMOL/L (ref 98–107)
CO2 SERPL-SCNC: 22 MMOL/L (ref 21–32)
CREAT SERPL-MCNC: 3.53 MG/DL (ref 0.5–1.3)
EGFRCR SERPLBLD CKD-EPI 2021: 17 ML/MIN/1.73M*2
ERYTHROCYTE [DISTWIDTH] IN BLOOD BY AUTOMATED COUNT: 17.9 % (ref 11.5–14.5)
FERRITIN SERPL-MCNC: 113 NG/ML (ref 20–300)
GLUCOSE SERPL-MCNC: 106 MG/DL (ref 74–99)
HCT VFR BLD AUTO: 34 % (ref 41–52)
HGB BLD-MCNC: 10.6 G/DL (ref 13.5–17.5)
IRON SATN MFR SERPL: 15 % (ref 25–45)
IRON SERPL-MCNC: 61 UG/DL (ref 35–150)
MCH RBC QN AUTO: 28.3 PG (ref 26–34)
MCHC RBC AUTO-ENTMCNC: 31.2 G/DL (ref 32–36)
MCV RBC AUTO: 91 FL (ref 80–100)
NRBC BLD-RTO: 0 /100 WBCS (ref 0–0)
PHOSPHATE SERPL-MCNC: 4 MG/DL (ref 2.5–4.9)
PLATELET # BLD AUTO: 121 X10*3/UL (ref 150–450)
POTASSIUM SERPL-SCNC: 4.5 MMOL/L (ref 3.5–5.3)
RBC # BLD AUTO: 3.75 X10*6/UL (ref 4.5–5.9)
SODIUM SERPL-SCNC: 134 MMOL/L (ref 136–145)
TIBC SERPL-MCNC: 398 UG/DL (ref 240–445)
UIBC SERPL-MCNC: 337 UG/DL (ref 110–370)
URATE SERPL-MCNC: 11.4 MG/DL (ref 4–7.5)
WBC # BLD AUTO: 6.8 X10*3/UL (ref 4.4–11.3)

## 2025-01-23 PROCEDURE — 99215 OFFICE O/P EST HI 40 MIN: CPT | Performed by: INTERNAL MEDICINE

## 2025-01-23 PROCEDURE — 3080F DIAST BP >= 90 MM HG: CPT | Performed by: INTERNAL MEDICINE

## 2025-01-23 PROCEDURE — 83540 ASSAY OF IRON: CPT

## 2025-01-23 PROCEDURE — 82728 ASSAY OF FERRITIN: CPT

## 2025-01-23 PROCEDURE — 83550 IRON BINDING TEST: CPT

## 2025-01-23 PROCEDURE — G2211 COMPLEX E/M VISIT ADD ON: HCPCS | Performed by: INTERNAL MEDICINE

## 2025-01-23 PROCEDURE — 1159F MED LIST DOCD IN RCRD: CPT | Performed by: INTERNAL MEDICINE

## 2025-01-23 PROCEDURE — 93005 ELECTROCARDIOGRAM TRACING: CPT | Performed by: INTERNAL MEDICINE

## 2025-01-23 PROCEDURE — 80069 RENAL FUNCTION PANEL: CPT

## 2025-01-23 PROCEDURE — 83970 ASSAY OF PARATHORMONE: CPT

## 2025-01-23 PROCEDURE — 3077F SYST BP >= 140 MM HG: CPT | Performed by: INTERNAL MEDICINE

## 2025-01-23 PROCEDURE — 84550 ASSAY OF BLOOD/URIC ACID: CPT

## 2025-01-23 PROCEDURE — 85027 COMPLETE CBC AUTOMATED: CPT

## 2025-01-23 RX ORDER — LIDOCAINE HYDROCHLORIDE 10 MG/ML
4 INJECTION, SOLUTION INFILTRATION; PERINEURAL
Status: COMPLETED | OUTPATIENT
Start: 2025-01-02 | End: 2025-01-02

## 2025-01-23 RX ORDER — TRIAMCINOLONE ACETONIDE 40 MG/ML
40 INJECTION, SUSPENSION INTRA-ARTICULAR; INTRAMUSCULAR
Status: COMPLETED | OUTPATIENT
Start: 2025-01-02 | End: 2025-01-02

## 2025-01-23 RX ORDER — SODIUM BICARBONATE 325 MG/1
325 TABLET ORAL 4 TIMES DAILY
COMMUNITY

## 2025-01-23 ASSESSMENT — ENCOUNTER SYMPTOMS
OCCASIONAL FEELINGS OF UNSTEADINESS: 1
DEPRESSION: 0
LOSS OF SENSATION IN FEET: 0

## 2025-01-23 NOTE — PROGRESS NOTES
"Chief Complaint:   No chief complaint on file.     History Of Present Illness:    Tom Haas is a 79 y.o. male here for followup. He has a history of CAD by [facility] '05 (Lcx 80-90%, LAD 50-60%, RCA 40%), carotid stenosis, hypertension, hyperlipidemia, CKD (Cr 1.37 7/2020), & hydronephrosis. Had limited medical followup through the years. He was hospitalized 3/2024 with acute systolic HF and worsened CKD (stage IV-V). Low flow / low gradient aortic stenosis was also noted. Troponin peaked at > 4,000 (non-MI troponin elevation / acute non-traumatic myocardial injury).    He reports doing well outside of a low energy level. Reports BP's at home in the 100 teens to 130's at the highest (checks daily). Denies cardiovascular symptoms. He had his ICD placed since his last visit and had an appropriate device discharge for VF (assessed by EP) with no further action recommended. He holly having any sensation of the discharge.        Last Recorded Vitals:  Vitals:    01/23/25 1515   BP: 152/90   Pulse: 82   SpO2: 97%   Weight: 76.5 kg (168 lb 9.6 oz)   Height: 1.651 m (5' 5\")             Allergies:  Patient has no known allergies.    Outpatient Medications:  Current Outpatient Medications   Medication Instructions    aspirin 81 mg, oral, Daily    atorvastatin (LIPITOR) 20 mg, oral, Nightly    carvedilol (COREG) 6.25 mg, oral, 2 times daily    cholecalciferol (VITAMIN D-3) 2,000 Units, oral, Daily    ferrous sulfate 65 mg, oral, Daily with breakfast    furosemide (LASIX) 80 mg, oral, Daily    hydrALAZINE (APRESOLINE) 50 mg, oral, 2 times daily    isosorbide mononitrate ER (IMDUR) 30 mg, oral, Daily, Do not crush or chew.         Physical Exam:  Gen Well appearing elderly male sitting up in NAD. Body mass index is 28.06 kg/m².   CV rrr. 2/6 JM. No JVD or leg edema.    Pulm Lungs clear with normal respiratory effort.  Neuro Alert and conversant. Grossly nonfocal.        I reviewed most recent imaging / labs / and office " notes.    I reviewed the patient's ECG - NSR. First degree AV block. PVC's. LVH. Inferior infarct pattern. Left anterior fascicular block       Assessment/Plan   1.  HFrEF  Cardiomyopathy NOS. Persistent despite medical therapy. He is status post ICD and suffered a device discharge as above. He has not had an ischemic evaluation with coronary angiography given his renal failure, and lack of ischemic symptoms. No plans for SGLT2-I/Aldactone/ACE/ARB/ARNI due to his significant renal dysfunction. Offered caridac rehab for his reduced energy levels and he plans to think about joining.     2. Hypertension   BP uncontrolled today but reasonable with home checks. His present regimen is to continue.     3. Coronary artery disease  Some obstructive disease noted '05 in the Cx and non-obstructive disease elsewhere. On indefinite aspirin / statin. Potential future coronary angiography as above.    4. Aortic stenosis  Low flow / low gradient and persistent by most recent TTE. No plans to pursue valve replacement given angiography would be part of the workup and would likely lead to IHD which we'd like to avoid if at all possible at this stage. Monitoring.        Follow-up 6 months.        Kelvin Murphy MD

## 2025-01-24 DIAGNOSIS — I50.43 CHF (CONGESTIVE HEART FAILURE), NYHA CLASS I, ACUTE ON CHRONIC, COMBINED: ICD-10-CM

## 2025-01-24 LAB
ATRIAL RATE: 82 BPM
P AXIS: 76 DEGREES
P OFFSET: 142 MS
P ONSET: 78 MS
PR INTERVAL: 246 MS
PTH-INTACT SERPL-MCNC: 119.3 PG/ML (ref 18.5–88)
Q ONSET: 201 MS
QRS COUNT: 14 BEATS
QRS DURATION: 120 MS
QT INTERVAL: 410 MS
QTC CALCULATION(BAZETT): 479 MS
QTC FREDERICIA: 455 MS
R AXIS: -59 DEGREES
T AXIS: 117 DEGREES
T OFFSET: 406 MS
VENTRICULAR RATE: 82 BPM

## 2025-01-24 NOTE — PROGRESS NOTES
PRIMARY CARE PHYSICIAN: Mansoor Hinkle MD  REFERRING PROVIDER: Mansoor Hinkle MD  [address] [facility]  [address]  [place] [address]       SUBJECTIVE  CHIEF COMPLAINT:   Chief Complaint   Patient presents with    Left Knee - New Patient Visit, Pain        HPI: Tom Haas is a pleasant 79 y.o. year-old male who is seen today for evaluation of left knee pain.  Pain has been present for several months. Pain is exacerbated by activity.     REVIEW OF SYSTEMS  The patient denies any fever, chills, chest pain, shortness of breath or difficulty breathing.  Patient denies any numbness, tingling, or radicular symptoms.  Adult patient history sheet was filled out by the patient today in clinic and will be scanned into the EMR.  I personally reviewed this form which will be scanned into the EMR.  This includes Past Medical History, Past Surgical History, Medications, Allergies, Social History, Family History and 12 point review of systems.    Past Medical History:   Diagnosis Date    Chronic kidney disease     Heart disease     Hypertension     Occlusion and stenosis of unspecified carotid artery     Carotid atherosclerosis    Other conditions influencing health status     Coronary Artery Disease    Personal history of other diseases of the circulatory system     History of congestive heart disease    Personal history of other diseases of the circulatory system     History of hypertension    Personal history of other endocrine, nutritional and metabolic disease     History of hyperlipidemia        No Known Allergies     Past Surgical History:   Procedure Laterality Date    CARDIAC ELECTROPHYSIOLOGY PROCEDURE Left 10/1/2024    Procedure: ICD Dual Implant;  Surgeon: Saul Power MD;  Location: [place] [address] [facility];  Service: Electrophysiology;  Laterality: Left;  DC ICD SJM        No family history on file.     Social History     Socioeconomic History    Marital status:      Spouse name: Not on file    Number of  children: Not on file    Years of education: Not on file    Highest education level: Not on file   Occupational History    Not on file   Tobacco Use    Smoking status: Never    Smokeless tobacco: Never   Vaping Use    Vaping status: Never Used   Substance and Sexual Activity    Alcohol use: Yes     Comment: on rare occasion    Drug use: Never    Sexual activity: Defer   Other Topics Concern    Not on file   Social History Narrative    Not on file     Social Drivers of Health     Financial Resource Strain: Low Risk  (7/26/2024)    Overall Financial Resource Strain (CARDIA)     Difficulty of Paying Living Expenses: Not hard at all   Food Insecurity: No Food Insecurity (7/26/2024)    Hunger Vital Sign     Worried About Running Out of Food in the Last Year: Never true     Ran Out of Food in the Last Year: Never true   Transportation Needs: No Transportation Needs (7/26/2024)    PRAPARE - Transportation     Lack of Transportation (Medical): No     Lack of Transportation (Non-Medical): No   Physical Activity: Inactive (7/26/2024)    Exercise Vital Sign     Days of Exercise per Week: 0 days     Minutes of Exercise per Session: 0 min   Stress: No Stress Concern Present (7/26/2024)    [place] citizen [place] of Occupational [employer] - Occupational Stress Questionnaire     Feeling of Stress : Not at all   Social Connections: Unknown (7/26/2024)    Social Connection and Isolation Panel [NHANES]     Frequency of Communication with Friends and Family: Once a week     Frequency of Social Gatherings with Friends and Family: Once a week     Attends Buddhism Services: Not on file     Active Member of Clubs or Organizations: No     Attends Club or Organization Meetings: Never     Marital Status:    Intimate Partner Violence: Not At Risk (7/26/2024)    Humiliation, Afraid, Rape, and Kick questionnaire     Fear of Current or Ex-Partner: No     Emotionally Abused: No     Physically Abused: No     Sexually Abused: No   Housing Stability:  Low Risk  (7/26/2024)    Housing Stability Vital Sign     Unable to Pay for Housing in the Last Year: No     Number of Times Moved in the Last Year: 1     Homeless in the Last Year: No        CURRENT MEDICATIONS:   Current Outpatient Medications   Medication Sig Dispense Refill    aspirin 81 mg chewable tablet Chew 1 tablet (81 mg) once daily. Do not start before March 14, 2024. 30 tablet 1    atorvastatin (Lipitor) 20 mg tablet Take 1 tablet (20 mg) by mouth once daily at bedtime. 90 tablet 3    carvedilol (Coreg) 6.25 mg tablet Take 1 tablet (6.25 mg) by mouth 2 times a day. 180 tablet 3    cholecalciferol (Vitamin D-3) 50 MCG (2000 UT) tablet Take 1 tablet (2,000 Units) by mouth once daily. Do not start before March 14, 2024. 30 tablet 1    ferrous sulfate 324 mg (65 mg elemental iron) EC tablet (delayed release) Take 1 tablet (65 mg) by mouth once daily with breakfast. (Patient not taking: Reported on 1/23/2025) 60 tablet 3    furosemide (Lasix) 80 mg tablet Take 1 tablet (80 mg) by mouth once daily. 90 tablet 3    hydrALAZINE (Apresoline) 50 mg tablet Take 1 tablet (50 mg) by mouth 2 times a day. 180 tablet 3    isosorbide mononitrate ER (Imdur) 30 mg 24 hr tablet Take 1 tablet (30 mg) by mouth once daily. Do not crush or chew. 90 tablet 3    sodium bicarbonate 325 mg tablet Take 1 tablet (325 mg) by mouth 4 times a day. 1/2 tablet 4 times a day       No current facility-administered medications for this visit.        OBJECTIVE    PHYSICAL EXAM  There is no height or weight on file to calculate BMI.    General: Well-appearing male in no acute distress.  Awake, alert and oriented.  Pleasant and cooperative.  Respiratory: Non-labored breathing  Mood: Euthymic   Gait: Antalgic     Affected Left Knee  Limb Alignment: varus  ROM: Pain with terminal extension and flexion  Stable to varus and valgus stress at full extension and 30 degrees of flexion  Skin: Intact, no abrasions or draining sinuses  Effusion: None  Quad  Strength: 5/5  Hamstring Strength: 5/5  Patella Crepitus: None  Patella Grind: Positive  Tenderness: MJL  Sensation: Intact to light touch distally  Motor function: Able to fire TA, EHL, G/S  Pulses: Palpable DP pulse    IMAGING:  AP / lateral/ mid-flexion/sunrise views: Independent review of left knee x-rays was performed. The findings were reviewed with the patient. There are severe degenerative changes of the left knee with varus limb alignment. There is joint space narrowing, subchondral sclerosis, and osteophyte formation. No evidence of fracture, AVN, dislocation, osteomyelitis.        ASSESSMENT & PLAN    IMPRESSION:  Tom Haas is a 79 y.o. male with endstage OA of the left knee. Given his advanced age and cardiac co-morbidities, I believe we should start with non-surgical treatment.    PLAN:  I had an in-depth discussion about the nature and natural history of degenerative joint disease.  I explained that it is progressive, but there is no way to predict how quickly a patient's symptoms will get worse.  I advised that the patient that they can manage their pain symptomatically, with 3-5 day courses of nonsteroidal anti-inflammatories and activity modification as needed when there is a a flare up. I explained to Tom Haas that the best interventions they can take to perhaps slow the progression of the degenerative changes in the long-term are maintaining a healthy weight and performing low impact exercise such as cycling, walking, and swimming. The use of a walking stick, cane or other assistive device can help as well.     I also discussed more invasive treatment options including injections and radiofrequency nerve ablation. I talked about the risks and benefits of injections, focusing on the fact that there is no way to predict the degree or duration of symptom relief, but that it can provide weeks to months of pain relief in most patients. Should these measures fail, the most invasive option would  be joint replacement surgery. The patient should try and delay joint replacement surgery for as long as possible. If the patients' joint problem affects their quality of life and cause significant restrictions of their activities, they may want to consider joint replacement surgery. I briefly covered the risks, benefits and expected recovery after total joint arthroplasty.    Tom Haas may one day benefit from an elective total joint replacement however has not yet exhausted other treatment options. I have recommended:  1. Patient ID: Tom Haas is a 79 y.o. male.    L Inj/Asp: L knee on 1/2/2025 5:00 PM  Indications: pain and joint swelling  Details: 22 G needle, anterolateral approach  Medications: 40 mg triamcinolone acetonide 40 mg/mL; 4 mL lidocaine 10 mg/mL (1 %)  Outcome: tolerated well, no immediate complications    Discussion:  I discussed the conservative treatment options for knee osteoarthritis including but not limited to physical therapy, oral NSAIDS, activity and lifestyle modification, and corticosteroid injections. Pt has elected to undergo a cortisone injection today. I have explained the risk and benefits of an injection including the possibility of joint infection, bleeding, damage to cartilage, allergic reaction. Patient verbalized understanding and gave verbal consent wishes to proceed with a intra-articular cortisone injection for their knee.    Procedure:  After discussing the risk and benefits of the procedure, we proceeded with an intra-articular left knee injection.    With the patient's informed verbal consent, the left knee was prepped in standard sterile fashion with Chlorhexidine. The skin was then anesthetized with ethyl chloride spray and cleaned again with Chlorhexidine. The knee was then apirated/injected with a prefilled 20-gauge syringe of 40 mg Kenalog + 4 ml Lidocaine using the lateral approach without complications.  The patient tolerated this well and felt immediate  initial relief of symptoms. A bandaid was applied and the patient ambulated out of the clinic on ther own accord without difficulty. Patient was instructed to avoid physical activity for 24-48 hours to prevent the knees from swelling and may ice the knees as tolerated. Patient should contact the office if any signs of of infection appear: redness, fever, chills, drainage, swelling or warmth to the knees.  Pt understands that the injections can be repeated no sooner than 3 months.  Procedure, treatment alternatives, risks and benefits explained, specific risks discussed. Consent was given by the patient. Immediately prior to procedure a time out was called to verify the correct patient, procedure, equipment, support staff and site/side marked as required. Patient was prepped and draped in the usual sterile fashion.           We will plan on re-assessing the status in 4 months, or sooner if symptoms worsen.     All of the patient's questions were answered and he was comfortable with this plan of care.  Tom Haas was encouraged to call if any problems, questions or concerns arise.     * This office note was dictated using Dragon voice to text software and was not proofread for spelling or grammatical errors *

## 2025-02-08 ENCOUNTER — HOSPITAL ENCOUNTER (EMERGENCY)
Facility: HOSPITAL | Age: 80
Discharge: HOME | DRG: 699 | End: 2025-02-08
Attending: STUDENT IN AN ORGANIZED HEALTH CARE EDUCATION/TRAINING PROGRAM
Payer: MEDICARE

## 2025-02-08 VITALS
RESPIRATION RATE: 16 BRPM | SYSTOLIC BLOOD PRESSURE: 138 MMHG | HEART RATE: 84 BPM | BODY MASS INDEX: 27.99 KG/M2 | OXYGEN SATURATION: 96 % | DIASTOLIC BLOOD PRESSURE: 88 MMHG | HEIGHT: 65 IN | WEIGHT: 168 LBS | TEMPERATURE: 99 F

## 2025-02-08 DIAGNOSIS — R31.9 HEMATURIA, UNSPECIFIED TYPE: ICD-10-CM

## 2025-02-08 DIAGNOSIS — R33.9 URINE RETENTION: Primary | ICD-10-CM

## 2025-02-08 LAB
ANION GAP SERPL CALCULATED.3IONS-SCNC: 15 MMOL/L (ref 10–20)
APPEARANCE UR: ABNORMAL
BASOPHILS # BLD AUTO: 0.02 X10*3/UL (ref 0–0.1)
BASOPHILS NFR BLD AUTO: 0.3 %
BILIRUB UR STRIP.AUTO-MCNC: NEGATIVE MG/DL
BUN SERPL-MCNC: 83 MG/DL (ref 6–23)
CALCIUM SERPL-MCNC: 9.5 MG/DL (ref 8.6–10.3)
CHLORIDE SERPL-SCNC: 101 MMOL/L (ref 98–107)
CO2 SERPL-SCNC: 21 MMOL/L (ref 21–32)
COLOR UR: ABNORMAL
CREAT SERPL-MCNC: 3.53 MG/DL (ref 0.5–1.3)
EGFRCR SERPLBLD CKD-EPI 2021: 17 ML/MIN/1.73M*2
EOSINOPHIL # BLD AUTO: 0.13 X10*3/UL (ref 0–0.4)
EOSINOPHIL NFR BLD AUTO: 2 %
ERYTHROCYTE [DISTWIDTH] IN BLOOD BY AUTOMATED COUNT: 18.4 % (ref 11.5–14.5)
GLUCOSE SERPL-MCNC: 100 MG/DL (ref 74–99)
GLUCOSE UR STRIP.AUTO-MCNC: NORMAL MG/DL
HCT VFR BLD AUTO: 33 % (ref 41–52)
HGB BLD-MCNC: 10.8 G/DL (ref 13.5–17.5)
HOLD SPECIMEN: NORMAL
IMM GRANULOCYTES # BLD AUTO: 0.01 X10*3/UL (ref 0–0.5)
IMM GRANULOCYTES NFR BLD AUTO: 0.2 % (ref 0–0.9)
KETONES UR STRIP.AUTO-MCNC: NEGATIVE MG/DL
LEUKOCYTE ESTERASE UR QL STRIP.AUTO: ABNORMAL
LYMPHOCYTES # BLD AUTO: 0.57 X10*3/UL (ref 0.8–3)
LYMPHOCYTES NFR BLD AUTO: 8.7 %
MCH RBC QN AUTO: 29.7 PG (ref 26–34)
MCHC RBC AUTO-ENTMCNC: 32.7 G/DL (ref 32–36)
MCV RBC AUTO: 91 FL (ref 80–100)
MONOCYTES # BLD AUTO: 0.61 X10*3/UL (ref 0.05–0.8)
MONOCYTES NFR BLD AUTO: 9.3 %
NEUTROPHILS # BLD AUTO: 5.22 X10*3/UL (ref 1.6–5.5)
NEUTROPHILS NFR BLD AUTO: 79.5 %
NITRITE UR QL STRIP.AUTO: NEGATIVE
NRBC BLD-RTO: 0 /100 WBCS (ref 0–0)
PH UR STRIP.AUTO: 6.5 [PH]
PLATELET # BLD AUTO: 116 X10*3/UL (ref 150–450)
POTASSIUM SERPL-SCNC: 4.4 MMOL/L (ref 3.5–5.3)
PROT UR STRIP.AUTO-MCNC: ABNORMAL MG/DL
RBC # BLD AUTO: 3.64 X10*6/UL (ref 4.5–5.9)
RBC # UR STRIP.AUTO: ABNORMAL MG/DL
RBC #/AREA URNS AUTO: >20 /HPF
SODIUM SERPL-SCNC: 133 MMOL/L (ref 136–145)
SP GR UR STRIP.AUTO: 1.01
UROBILINOGEN UR STRIP.AUTO-MCNC: NORMAL MG/DL
WBC # BLD AUTO: 6.6 X10*3/UL (ref 4.4–11.3)
WBC #/AREA URNS AUTO: ABNORMAL /HPF

## 2025-02-08 PROCEDURE — 99283 EMERGENCY DEPT VISIT LOW MDM: CPT | Performed by: STUDENT IN AN ORGANIZED HEALTH CARE EDUCATION/TRAINING PROGRAM

## 2025-02-08 PROCEDURE — 82374 ASSAY BLOOD CARBON DIOXIDE: CPT | Performed by: PHYSICIAN ASSISTANT

## 2025-02-08 PROCEDURE — 87086 URINE CULTURE/COLONY COUNT: CPT | Mod: WESLAB | Performed by: PHYSICIAN ASSISTANT

## 2025-02-08 PROCEDURE — 85025 COMPLETE CBC W/AUTO DIFF WBC: CPT | Performed by: PHYSICIAN ASSISTANT

## 2025-02-08 PROCEDURE — 81001 URINALYSIS AUTO W/SCOPE: CPT | Performed by: PHYSICIAN ASSISTANT

## 2025-02-08 PROCEDURE — 36415 COLL VENOUS BLD VENIPUNCTURE: CPT | Performed by: PHYSICIAN ASSISTANT

## 2025-02-08 ASSESSMENT — PAIN - FUNCTIONAL ASSESSMENT
PAIN_FUNCTIONAL_ASSESSMENT: 0-10
PAIN_FUNCTIONAL_ASSESSMENT: 0-10

## 2025-02-08 ASSESSMENT — LIFESTYLE VARIABLES
HAVE PEOPLE ANNOYED YOU BY CRITICIZING YOUR DRINKING: NO
EVER HAD A DRINK FIRST THING IN THE MORNING TO STEADY YOUR NERVES TO GET RID OF A HANGOVER: NO
TOTAL SCORE: 0
EVER FELT BAD OR GUILTY ABOUT YOUR DRINKING: NO
HAVE YOU EVER FELT YOU SHOULD CUT DOWN ON YOUR DRINKING: NO

## 2025-02-08 ASSESSMENT — COLUMBIA-SUICIDE SEVERITY RATING SCALE - C-SSRS
6. HAVE YOU EVER DONE ANYTHING, STARTED TO DO ANYTHING, OR PREPARED TO DO ANYTHING TO END YOUR LIFE?: NO
1. IN THE PAST MONTH, HAVE YOU WISHED YOU WERE DEAD OR WISHED YOU COULD GO TO SLEEP AND NOT WAKE UP?: NO
2. HAVE YOU ACTUALLY HAD ANY THOUGHTS OF KILLING YOURSELF?: NO

## 2025-02-08 ASSESSMENT — PAIN SCALES - GENERAL
PAINLEVEL_OUTOF10: 0 - NO PAIN
PAINLEVEL_OUTOF10: 0 - NO PAIN

## 2025-02-08 NOTE — ED PROVIDER NOTES
HPI   Chief Complaint   Patient presents with   • Urinary Retention       79-year-old male presented emergency department with a chief complaint of urinary retention.  He had a cystoscopy yesterday with Dr. Castanon secondary to hematuria.  He is not anticoagulated.  States he has not urinated since 9 PM last night.  He complains of lower abdominal discomfort.  He has not vomited.  No diarrhea.  No fevers.  Denies injury or trauma numbness weakness.  No other complaint.              Patient History   Past Medical History:   Diagnosis Date   • Chronic kidney disease    • Heart disease    • Hypertension    • Occlusion and stenosis of unspecified carotid artery     Carotid atherosclerosis   • Other conditions influencing health status     Coronary Artery Disease   • Personal history of other diseases of the circulatory system     History of congestive heart disease   • Personal history of other diseases of the circulatory system     History of hypertension   • Personal history of other endocrine, nutritional and metabolic disease     History of hyperlipidemia     Past Surgical History:   Procedure Laterality Date   • CARDIAC ELECTROPHYSIOLOGY PROCEDURE Left 10/1/2024    Procedure: ICD Dual Implant;  Surgeon: Saul Power MD;  Location: [place] [address] [facility];  Service: Electrophysiology;  Laterality: Left;  DC ICD SJM     No family history on file.  Social History     Tobacco Use   • Smoking status: Never   • Smokeless tobacco: Never   Vaping Use   • Vaping status: Never Used   Substance Use Topics   • Alcohol use: Yes     Comment: on rare occasion   • Drug use: Never       Physical Exam   ED Triage Vitals [02/08/25 1003]   Temperature Heart Rate Respirations BP   37.2 °C (99 °F) 80 18 133/85      Pulse Ox Temp Source Heart Rate Source Patient Position   97 % Temporal -- --      BP Location FiO2 (%)     -- --       Physical Exam  Vitals and nursing note reviewed.   Constitutional:       Appearance: Normal  appearance.   HENT:      Head: Normocephalic.      Nose: Nose normal.      Mouth/Throat:      Mouth: Mucous membranes are moist.   Cardiovascular:      Rate and Rhythm: Normal rate and regular rhythm.      Pulses: Normal pulses.   Pulmonary:      Effort: Pulmonary effort is normal.   Abdominal:      General: Abdomen is flat.      Palpations: Abdomen is soft.   Musculoskeletal:         General: Normal range of motion.   Skin:     General: Skin is warm.   Neurological:      General: No focal deficit present.      Mental Status: He is alert and oriented to person, place, and time.   Psychiatric:         Mood and Affect: Mood normal.           ED Course & MDM   Diagnoses as of 02/08/25 1251   Urine retention   Hematuria, unspecified type                 No data recorded     [place] Coma Scale Score: 15 (02/08/25 1005 : Dalila Cruz RN)                           Medical Decision Making  I have seen and evaluated this patient.  The attending physician has also seen and evaluated this patient.  Vital signs, laboratory testing and diagnostic images if applicable have been reviewed.  All laboratory and imaging is interpreted by myself unless otherwise stated.  Radiology studies are also formally interpreted by radiologist.     CBC without significant leukocytosis or anemia, metabolic panel without significant renal impairment or electrolyte abnormality.  Urinalysis noninfected.  Urine irrigated color improving not consistent with continuous hemorrhage released in stable condition with outpatient urology follow-up    Labs Reviewed  CBC WITH AUTO DIFFERENTIAL - Abnormal     WBC                           6.6                    nRBC                          0.0                    RBC                           3.64 (*)               Hemoglobin                    10.8 (*)               Hematocrit                    33.0 (*)               MCV                           91                     MCH                           29.7                    MCHC                          32.7                   RDW                           18.4 (*)               Platelets                     116 (*)                Neutrophils %                 79.5                   Immature Granulocytes %, Automated   0.2                    Lymphocytes %                 8.7                    Monocytes %                   9.3                    Eosinophils %                 2.0                    Basophils %                   0.3                    Neutrophils Absolute          5.22                   Immature Granulocytes Absolute, Au*   0.01                   Lymphocytes Absolute          0.57 (*)               Monocytes Absolute            0.61                   Eosinophils Absolute          0.13                   Basophils Absolute            0.02                BASIC METABOLIC PANEL - Abnormal     Glucose                       100 (*)                Sodium                        133 (*)                Potassium                     4.4                    Chloride                      101                    Bicarbonate                   21                     Anion Gap                     15                     Urea Nitrogen                 83 (*)                 Creatinine                    3.53 (*)               eGFR                          17 (*)                 Calcium                       9.5                 URINALYSIS WITH REFLEX CULTURE AND MICROSCOPIC - Abnormal     Color, Urine                  Brown (*)               Appearance, Urine             Turbid (*)               Specific Gravity, Urine       1.013                  pH, Urine                     6.5                    Protein, Urine                200 (2+) (*)               Glucose, Urine                Normal                 Blood, Urine                  1.0 (3+) (*)               Ketones, Urine                NEGATIVE                Bilirubin, Urine              NEGATIVE                Urobilinogen,  Urine           Normal                 Nitrite, Urine                NEGATIVE                Leukocyte Esterase, Urine     25 Cori/uL (*)            MICROSCOPIC ONLY, URINE - Abnormal     WBC, Urine                    1-5                    RBC, Urine                    >20 (*)             URINE CULTURE  URINALYSIS WITH REFLEX CULTURE AND MICROSCOPIC       Narrative: The following orders were created for panel order Urinalysis with Reflex Culture and Microscopic.                Procedure                               Abnormality         Status                                   ---------                               -----------         ------                                   Urinalysis with Reflex C...[[id number]]  Abnormal            Final result                             Extra Urine Gray Tube[[id number]]                            In process                                               Please view results for these tests on the individual orders.  EXTRA URINE GRAY TUBE  No orders to display  Medications - No data to display  New Prescriptions  No medications on file            Procedure  Procedures     Elías Khanna PA-C  02/08/25 1258

## 2025-02-08 NOTE — ED TRIAGE NOTES
Pt states that he had a scope of his bladder yesterday. Pt states he then had some pink urine with come clots and now is unable to urinate.

## 2025-02-09 ENCOUNTER — HOSPITAL ENCOUNTER (INPATIENT)
Facility: HOSPITAL | Age: 80
End: 2025-02-09
Attending: EMERGENCY MEDICINE | Admitting: HOSPITALIST
Payer: MEDICARE

## 2025-02-09 VITALS
WEIGHT: 168 LBS | HEIGHT: 65 IN | DIASTOLIC BLOOD PRESSURE: 72 MMHG | RESPIRATION RATE: 19 BRPM | BODY MASS INDEX: 27.99 KG/M2 | TEMPERATURE: 97.3 F | OXYGEN SATURATION: 96 % | SYSTOLIC BLOOD PRESSURE: 118 MMHG | HEART RATE: 86 BPM

## 2025-02-09 DIAGNOSIS — T83.9XXA FOLEY CATHETER PROBLEM, INITIAL ENCOUNTER (CMS-HCC): ICD-10-CM

## 2025-02-09 DIAGNOSIS — R31.0 GROSS HEMATURIA: Primary | ICD-10-CM

## 2025-02-09 LAB
ANION GAP SERPL CALCULATED.3IONS-SCNC: 16 MMOL/L (ref 10–20)
APPEARANCE UR: ABNORMAL
BACTERIA #/AREA URNS AUTO: ABNORMAL /HPF
BACTERIA UR CULT: NO GROWTH
BASOPHILS # BLD AUTO: 0.02 X10*3/UL (ref 0–0.1)
BASOPHILS NFR BLD AUTO: 0.3 %
BILIRUB UR STRIP.AUTO-MCNC: NEGATIVE MG/DL
BUN SERPL-MCNC: 77 MG/DL (ref 6–23)
CALCIUM SERPL-MCNC: 9.4 MG/DL (ref 8.6–10.3)
CHLORIDE SERPL-SCNC: 99 MMOL/L (ref 98–107)
CO2 SERPL-SCNC: 19 MMOL/L (ref 21–32)
COLOR UR: ABNORMAL
CREAT SERPL-MCNC: 3.3 MG/DL (ref 0.5–1.3)
EGFRCR SERPLBLD CKD-EPI 2021: 18 ML/MIN/1.73M*2
EOSINOPHIL # BLD AUTO: 0.16 X10*3/UL (ref 0–0.4)
EOSINOPHIL NFR BLD AUTO: 2.4 %
ERYTHROCYTE [DISTWIDTH] IN BLOOD BY AUTOMATED COUNT: 18.4 % (ref 11.5–14.5)
GLUCOSE SERPL-MCNC: 105 MG/DL (ref 74–99)
GLUCOSE UR STRIP.AUTO-MCNC: NORMAL MG/DL
HCT VFR BLD AUTO: 33.1 % (ref 41–52)
HGB BLD-MCNC: 10.9 G/DL (ref 13.5–17.5)
HOLD SPECIMEN: NORMAL
IMM GRANULOCYTES # BLD AUTO: 0.01 X10*3/UL (ref 0–0.5)
IMM GRANULOCYTES NFR BLD AUTO: 0.2 % (ref 0–0.9)
KETONES UR STRIP.AUTO-MCNC: NEGATIVE MG/DL
LEUKOCYTE ESTERASE UR QL STRIP.AUTO: ABNORMAL
LYMPHOCYTES # BLD AUTO: 0.73 X10*3/UL (ref 0.8–3)
LYMPHOCYTES NFR BLD AUTO: 11.1 %
MCH RBC QN AUTO: 29.7 PG (ref 26–34)
MCHC RBC AUTO-ENTMCNC: 32.9 G/DL (ref 32–36)
MCV RBC AUTO: 90 FL (ref 80–100)
MONOCYTES # BLD AUTO: 0.64 X10*3/UL (ref 0.05–0.8)
MONOCYTES NFR BLD AUTO: 9.7 %
NEUTROPHILS # BLD AUTO: 5.03 X10*3/UL (ref 1.6–5.5)
NEUTROPHILS NFR BLD AUTO: 76.3 %
NITRITE UR QL STRIP.AUTO: NEGATIVE
NRBC BLD-RTO: 0 /100 WBCS (ref 0–0)
PH UR STRIP.AUTO: 6.5 [PH]
PLATELET # BLD AUTO: 111 X10*3/UL (ref 150–450)
POTASSIUM SERPL-SCNC: 4.4 MMOL/L (ref 3.5–5.3)
PROT UR STRIP.AUTO-MCNC: ABNORMAL MG/DL
RBC # BLD AUTO: 3.67 X10*6/UL (ref 4.5–5.9)
RBC # UR STRIP.AUTO: ABNORMAL MG/DL
RBC #/AREA URNS AUTO: >20 /HPF
SODIUM SERPL-SCNC: 130 MMOL/L (ref 136–145)
SP GR UR STRIP.AUTO: 1.02
UROBILINOGEN UR STRIP.AUTO-MCNC: NORMAL MG/DL
WBC # BLD AUTO: 6.6 X10*3/UL (ref 4.4–11.3)
WBC #/AREA URNS AUTO: ABNORMAL /HPF

## 2025-02-09 PROCEDURE — 85025 COMPLETE CBC W/AUTO DIFF WBC: CPT | Performed by: STUDENT IN AN ORGANIZED HEALTH CARE EDUCATION/TRAINING PROGRAM

## 2025-02-09 PROCEDURE — 1210000001 HC SEMI-PRIVATE ROOM DAILY

## 2025-02-09 PROCEDURE — 87086 URINE CULTURE/COLONY COUNT: CPT | Mod: WESLAB | Performed by: STUDENT IN AN ORGANIZED HEALTH CARE EDUCATION/TRAINING PROGRAM

## 2025-02-09 PROCEDURE — 99223 1ST HOSP IP/OBS HIGH 75: CPT | Performed by: HOSPITALIST

## 2025-02-09 PROCEDURE — 2500000001 HC RX 250 WO HCPCS SELF ADMINISTERED DRUGS (ALT 637 FOR MEDICARE OP): Performed by: HOSPITALIST

## 2025-02-09 PROCEDURE — 81003 URINALYSIS AUTO W/O SCOPE: CPT | Performed by: STUDENT IN AN ORGANIZED HEALTH CARE EDUCATION/TRAINING PROGRAM

## 2025-02-09 PROCEDURE — 2500000004 HC RX 250 GENERAL PHARMACY W/ HCPCS (ALT 636 FOR OP/ED): Performed by: HOSPITALIST

## 2025-02-09 PROCEDURE — 51702 INSERT TEMP BLADDER CATH: CPT

## 2025-02-09 PROCEDURE — 36415 COLL VENOUS BLD VENIPUNCTURE: CPT | Performed by: STUDENT IN AN ORGANIZED HEALTH CARE EDUCATION/TRAINING PROGRAM

## 2025-02-09 PROCEDURE — 99285 EMERGENCY DEPT VISIT HI MDM: CPT | Performed by: EMERGENCY MEDICINE

## 2025-02-09 PROCEDURE — 80048 BASIC METABOLIC PNL TOTAL CA: CPT | Performed by: STUDENT IN AN ORGANIZED HEALTH CARE EDUCATION/TRAINING PROGRAM

## 2025-02-09 RX ORDER — HYDRALAZINE HYDROCHLORIDE 50 MG/1
50 TABLET, FILM COATED ORAL 2 TIMES DAILY
Status: DISPENSED | OUTPATIENT
Start: 2025-02-09

## 2025-02-09 RX ORDER — ISOSORBIDE MONONITRATE 30 MG/1
30 TABLET, EXTENDED RELEASE ORAL DAILY
Status: DISPENSED | OUTPATIENT
Start: 2025-02-09

## 2025-02-09 RX ORDER — NAPROXEN SODIUM 220 MG/1
81 TABLET, FILM COATED ORAL DAILY
Status: ACTIVE | OUTPATIENT
Start: 2025-02-09

## 2025-02-09 RX ORDER — FUROSEMIDE 40 MG/1
80 TABLET ORAL DAILY
Status: DISPENSED | OUTPATIENT
Start: 2025-02-09

## 2025-02-09 RX ORDER — ONDANSETRON HYDROCHLORIDE 2 MG/ML
4 INJECTION, SOLUTION INTRAVENOUS EVERY 6 HOURS PRN
Status: ACTIVE | OUTPATIENT
Start: 2025-02-09

## 2025-02-09 RX ORDER — POLYETHYLENE GLYCOL 3350 17 G/17G
17 POWDER, FOR SOLUTION ORAL DAILY PRN
Status: ACTIVE | OUTPATIENT
Start: 2025-02-09

## 2025-02-09 RX ORDER — CARVEDILOL 6.25 MG/1
6.25 TABLET ORAL
Status: DISPENSED | OUTPATIENT
Start: 2025-02-09

## 2025-02-09 RX ORDER — SODIUM BICARBONATE 650 MG/1
325 TABLET ORAL 4 TIMES DAILY
Status: DISCONTINUED | OUTPATIENT
Start: 2025-02-09 | End: 2025-02-09

## 2025-02-09 RX ORDER — ACETAMINOPHEN 325 MG/1
650 TABLET ORAL EVERY 6 HOURS PRN
Status: ACTIVE | OUTPATIENT
Start: 2025-02-09

## 2025-02-09 RX ORDER — ATORVASTATIN CALCIUM 20 MG/1
20 TABLET, FILM COATED ORAL NIGHTLY
Status: DISPENSED | OUTPATIENT
Start: 2025-02-09

## 2025-02-09 RX ORDER — SODIUM BICARBONATE 650 MG/1
325 TABLET ORAL 2 TIMES DAILY
Status: DISPENSED | OUTPATIENT
Start: 2025-02-09

## 2025-02-09 RX ADMIN — HYDRALAZINE HYDROCHLORIDE 50 MG: 50 TABLET ORAL at 11:46

## 2025-02-09 RX ADMIN — FUROSEMIDE 80 MG: 40 TABLET ORAL at 11:46

## 2025-02-09 RX ADMIN — ISOSORBIDE MONONITRATE 30 MG: 30 TABLET, EXTENDED RELEASE ORAL at 11:46

## 2025-02-09 RX ADMIN — SODIUM BICARBONATE 650 MG TABLET 325 MG: at 20:34

## 2025-02-09 RX ADMIN — ATORVASTATIN CALCIUM 20 MG: 20 TABLET, FILM COATED ORAL at 20:34

## 2025-02-09 RX ADMIN — CARVEDILOL 6.25 MG: 6.25 TABLET, FILM COATED ORAL at 11:46

## 2025-02-09 RX ADMIN — SODIUM BICARBONATE 650 MG TABLET 325 MG: at 12:08

## 2025-02-09 RX ADMIN — CARVEDILOL 6.25 MG: 6.25 TABLET, FILM COATED ORAL at 20:34

## 2025-02-09 RX ADMIN — HYDRALAZINE HYDROCHLORIDE 50 MG: 50 TABLET ORAL at 20:34

## 2025-02-09 ASSESSMENT — LIFESTYLE VARIABLES
HAVE YOU EVER FELT YOU SHOULD CUT DOWN ON YOUR DRINKING: NO
EVER HAD A DRINK FIRST THING IN THE MORNING TO STEADY YOUR NERVES TO GET RID OF A HANGOVER: NO
EVER FELT BAD OR GUILTY ABOUT YOUR DRINKING: NO
TOTAL SCORE: 0
HAVE PEOPLE ANNOYED YOU BY CRITICIZING YOUR DRINKING: NO

## 2025-02-09 ASSESSMENT — PAIN - FUNCTIONAL ASSESSMENT: PAIN_FUNCTIONAL_ASSESSMENT: 0-10

## 2025-02-09 NOTE — ED PROVIDER NOTES
EMERGENCY DEPARTMENT ENCOUNTER      Pt Name: Tom Haas  MRN: [id number]  Birthdate 1945  Date of evaluation: 2/9/2025  ED Provider: Tereza Mon DO     CHIEF COMPLAINT       Chief Complaint   Patient presents with    Clogged Rodriguez       HISTORY OF PRESENT ILLNESS    Tom Haas is a 79 y.o. who presents to the emergency department as a return visit for hematuria.  He was seen earlier today with urinary retention in the setting of recent cystoscopy with urology.  He had a Rodriguez catheter placed that was draining pink urine.  He states that he was draining well throughout the day however the bag was drained at approximately 10 PM and he has had no drainage since.  He did attempt to readjust and was unable to get any further drainage.  He is concerned there may be a clot causing this.  He is on a baby aspirin but no other blood thinners.  He denies any pain or pressure in the bladder area.  He does admit to leaking around the catheter at the meatus.    REVIEW OF SYSTEMS     Focused ROS performed and negative other than as listed in HPI    PAST MEDICAL HISTORY     Past Medical History:   Diagnosis Date    Chronic kidney disease     Heart disease     Hypertension     Occlusion and stenosis of unspecified carotid artery     Carotid atherosclerosis    Other conditions influencing health status     Coronary Artery Disease    Personal history of other diseases of the circulatory system     History of congestive heart disease    Personal history of other diseases of the circulatory system     History of hypertension    Personal history of other endocrine, nutritional and metabolic disease     History of hyperlipidemia       SURGICAL HISTORY       Past Surgical History:   Procedure Laterality Date    CARDIAC ELECTROPHYSIOLOGY PROCEDURE Left 10/1/2024    Procedure: ICD Dual Implant;  Surgeon: Saul Power MD;  Location: [place] [address] Cardiac Cath Lab;  Service: Electrophysiology;  Laterality: Left;  DC ICD [facility]        CURRENT MEDICATIONS       Previous Medications    ASPIRIN 81 MG CHEWABLE TABLET    Chew 1 tablet (81 mg) once daily. Do not start before March 14, 2024.    ATORVASTATIN (LIPITOR) 20 MG TABLET    Take 1 tablet (20 mg) by mouth once daily at bedtime.    CARVEDILOL (COREG) 6.25 MG TABLET    Take 1 tablet (6.25 mg) by mouth 2 times a day.    CHOLECALCIFEROL (VITAMIN D-3) 50 MCG (2000 UT) TABLET    Take 1 tablet (2,000 Units) by mouth once daily. Do not start before March 14, 2024.    FERROUS SULFATE 324 MG (65 MG ELEMENTAL IRON) EC TABLET (DELAYED RELEASE)    Take 1 tablet (65 mg) by mouth once daily with breakfast.    FUROSEMIDE (LASIX) 80 MG TABLET    Take 1 tablet (80 mg) by mouth once daily.    HYDRALAZINE (APRESOLINE) 50 MG TABLET    Take 1 tablet (50 mg) by mouth 2 times a day.    ISOSORBIDE MONONITRATE ER (IMDUR) 30 MG 24 HR TABLET    Take 1 tablet (30 mg) by mouth once daily. Do not crush or chew.    SODIUM BICARBONATE 325 MG TABLET    Take 1 tablet (325 mg) by mouth 4 times a day. 1/2 tablet 4 times a day       ALLERGIES     Patient has no known allergies.    FAMILY HISTORY     No family history on file.     SOCIAL HISTORY       Social History     Socioeconomic History    Marital status:    Tobacco Use    Smoking status: Never    Smokeless tobacco: Never   Vaping Use    Vaping status: Never Used   Substance and Sexual Activity    Alcohol use: Yes     Comment: on rare occasion    Drug use: Never    Sexual activity: Defer     Social Drivers of Health     Financial Resource Strain: Low Risk  (7/26/2024)    Overall Financial Resource Strain (CARDIA)     Difficulty of Paying Living Expenses: Not hard at all   Food Insecurity: No Food Insecurity (7/26/2024)    Hunger Vital Sign     Worried About Running Out of Food in the Last Year: Never true     Ran Out of Food in the Last Year: Never true   Transportation Needs: No Transportation Needs (7/26/2024)    PRAPARE - Transportation     Lack of Transportation  (Medical): No     Lack of Transportation (Non-Medical): No   Physical Activity: Inactive (7/26/2024)    Exercise Vital Sign     Days of Exercise per Week: 0 days     Minutes of Exercise per Session: 0 min   Stress: No Stress Concern Present (7/26/2024)    [employer] [place] [employer] - Occupational Stress Questionnaire     Feeling of Stress : Not at all   Social Connections: Unknown (7/26/2024)    Social Connection and Isolation Panel [NHANES]     Frequency of Communication with Friends and Family: Once a week     Frequency of Social Gatherings with Friends and Family: Once a week     Active Member of Clubs or Organizations: No     Attends Club or Organization Meetings: Never     Marital Status:    Intimate Partner Violence: Not At Risk (7/26/2024)    Humiliation, Afraid, Rape, and Kick questionnaire     Fear of Current or Ex-Partner: No     Emotionally Abused: No     Physically Abused: No     Sexually Abused: No   Housing Stability: Low Risk  (7/26/2024)    Housing Stability Vital Sign     Unable to Pay for Housing in the Last Year: No     Number of Times Moved in the Last Year: 1     Homeless in the Last Year: No       PHYSICAL EXAM       ED Triage Vitals [02/09/25 0424]   Temperature Heart Rate Respirations BP   36.3 °C (97.3 °F) 82 16 138/79      Pulse Ox Temp Source Heart Rate Source Patient Position   100 % Temporal Monitor --      BP Location FiO2 (%)     -- --        General: Appears as stated age, in no acute distress, alert  Head: Head atraumatic; normocephalic  Eyes: normal inspection; no icterus  ENT: mucosa moist without lesion  Neck: Normal inspection, no meningeal signs  Resp: Normal breath sounds, no wheeze or crackles; No respiratory distress  Chest Wall: no tenderness or deformity  Heart: Heart rate and rhythm regular; No Murmurs  Abdomen: Soft, Non-tender; No distention, guarding, rigidity, or rebound  MSK: Normal appearance; Moves all extremities; No Pedal edema  Neuro: Alert;  "no focal deficits, moves all extremities  Psych: Mood and Affect normal  Skin: Color appropriate; warm; Dry    DIAGNOSTIC RESULTS   Lab and radiology results are independently interpreted unless noted below.  RADIOLOGY (Per Emergency Physician):     Interpretation per the Radiologist below, if available at the time of this note:  No orders to display       LABS:  Abnormal Labs Reviewed - No data to display    All other labs were within normal range or not returned as of this dictation.    EKG:  Personally interpreted by Tereza Mon DO      EMERGENCY DEPARTMENT COURSE/MDM   Will attempt to manually irrigate the Rodriguez catheter to remove the clot as well as promote hemostasis.    {Scoring Tools Utilized:09702}         Meds Administered:  Medications - No data to display    PROCEDURES   Unless otherwise noted below, none  Procedures      FINAL IMPRESSION    No diagnosis found.      DISPOSITION       {ED Disposition:66815}    Critical Care time: {EFCC1:74307::\"Not Indicated\"}    (Comment: Please note this report has been produced using speech recognition software and may contain errors related to that system including errors in grammar, punctuation, and spelling, as well as words and phrases that may be inappropriate.  If there are any questions or concerns please feel free to contact the dictating provider for clarification.)    Tereza Mon DO (electronically signed)  Emergency Medicine Physician    History provided by: {History Provided By:91524::\"Patient\"}  Limitations to History: {History Limitations:74512::\"None\"}  External Records Reviewed with Brief Summary: {ED External Records Reviewed with Brief Summary:27943::\"None\"}  Social Determinants of Health which Significantly Impact Care: {Social Determinants of Health which Significantly Impact Care:47459::\"None identified\"}   EKG Independent Interpretation: {EKG Independent Interpretation:30161::\"EKG interpreted by myself. Please see ED Course for full " "interpretation.\"}  Independent Result Review and Interpretation: {Independent Result Review and Interpretation:43141::\"Relevant laboratory and radiographic results were reviewed and independently interpreted by myself.  As necessary, they are commented on in the ED Course.\"}  Chronic conditions affecting the patient's care: {Chronic conditions affecting the patient's care:88537::\"As documented above in MDM\"}  The patient was discussed with the following consultants/services: {The patient was discussed with the following consultants/services:51644::\"None\"}  Care Considerations: {Care Considerations:95104::\"As documented above in MDM\"}            " with Brief Summary:  prior ED notes  Social Determinants of Health which Significantly Impact Care: None identified   EKG Independent Interpretation: EKG not obtained  Independent Result Review and Interpretation: None obtained  Chronic conditions affecting the patient's care: As documented above in MDM  The patient was discussed with the following consultants/services: None  Care Considerations: As documented above in MDM               Tereza Mon DO  02/10/25 0050

## 2025-02-09 NOTE — H&P
History Of Present Illness  Tom Haas is a 79 y.o. male with history of CKD stage IV, coronary artery disease, chronic systolic heart failure status post ICD placement, hypertension who presents to the emergency room with hematuria.  Patient has issues with hematuria since July 2024.  He was admitted to [facility] when he had hematuria at that time and ultimately required three-way catheter irrigation.  He has been doing better since then but had follow-up cystoscopy with Dr. Castanon on Friday.  Developed hematuria yesterday with difficulty passing urine.  Was having significant bladder pain at that time.  He came to the ER yesterday and a Rodriguez catheter was placed.  This was irrigated and the blood clots resolved.  When the catheter was draining clear he was discharged to the hospital with recommended follow-up with Dr. Castanon.  Overnight his wife noted that the tubing seem to be clotted off.  Patient was complaining of intermittent cramping pain in his bladder.  He returned to the ER this morning.  Dr. Castanon was called.  Patient is now on three-way catheter irrigation.  Says he feels fine no other pain.  Urine is dark pink in the bag.  No fevers or chills.     Past Medical History  He has a past medical history of Chronic kidney disease, Heart disease, Hypertension, Occlusion and stenosis of unspecified carotid artery, Other conditions influencing health status, Personal history of other diseases of the circulatory system, Personal history of other diseases of the circulatory system, and Personal history of other endocrine, nutritional and metabolic disease.    Surgical History  He has a past surgical history that includes Cardiac electrophysiology procedure (Left, 10/01/2024) and Cataract extraction, bilateral.     Social History  He reports that he has never smoked. He has never used smokeless tobacco. He reports current alcohol use. He reports that he does not use drugs.    Family History  Family History    Problem Relation Name Age of Onset    Alcohol abuse Father           in his 50s related to complications of alcohol abuse        Allergies  Patient has no known allergies.    Review of Systems  General: no fatigue, no malaise, no fevers/chills   HENT: no rhinorrhea, no sore throat, no ear pain   Eyes: no change in vision, denies eye pain or discharge   Lungs: no SOB, no cough, no hemoptysis   CV: no chest pain, no palpitations, no leg edema   Abd: no nausea/vomiting, no constipation/diarrhea, no abdominal pain   : no dysuria, no frequency, no nocturia, no flank pain.+ Hematuria see HPI   Endocrine: no polydipsia/polyuria, no hot or cold intolerance   Neuro: no headaches, no syncope, no seizures   MSK: no back pain, no neck pain, no joint problems   Psych: no anxiety, no depression, no hallucinations    Physical Exam  General: alert, no diaphoresis   HENT: mucous membranes moist, external ears normal, no rhinorrhea   Eyes: no icterus or injection, no discharge   Lungs: CTA BL   Heart: RRR, no LE edema BL   GI: abdomen soft, nontender, nondistended, BS present   MSK: no joint effusion or deformity   Skin: no rashes, erythema, or ecchymosis   Neuro: grossly normal cognition, motor strength, sensation  Last Recorded Vitals  /84   Pulse 76   Temp 36.3 °C (97.3 °F) (Temporal)   Resp 16   Wt 76.2 kg (168 lb)   SpO2 97%     Relevant Results             Assessment/Plan   Assessment & Plan      Gross hematuria  - continuous 3 way irrigation  - blood counts stable overnight  - hold aspirin for right now  - Dr. Castanon on consult and aware  - no signs of UTI. Expect hematuria is post-procedural/traumatic    Chronic systolic heart failure  - compensated, home meds (on beta blocker, lasix, BP control. Not on ACE/ARB due to CKD)    CKD4  - follows with Dr. Bazan  - stable at baseline  - will monitor daily labs  - continue home PO bicarb    HTN   - home meds ordered    DVT ppx  - SCDs only for now       Radha J  Eder, DO

## 2025-02-09 NOTE — PROGRESS NOTES
Tom Haas is a 79 y.o. male on day 0 of admission presenting with Gross hematuria.    Patient has no orders for TCC, PT, OT consults.  Advised patient that RNCC is available should any discharge needs arise.   Patient from home with wife and daughter. He is independent and drives.   No dc needs identified.  Plan is to discharge home with no needs, family will transport.     Kanchan Fregoso RN

## 2025-02-09 NOTE — ED TRIAGE NOTES
Pt presents through triage. Pt was seen here yesterday for urinary retention and had a marin placed. Pt and daughter states that today  it is not draining appropriately. Pt does have blood noted in the marin but states that has been ongoing since his cystoscopy last week. The pt's daughter also noted that she noticed blood clots in the bag today.

## 2025-02-10 ENCOUNTER — DOCUMENTATION (OUTPATIENT)
Dept: RESEARCH | Age: 80
End: 2025-02-10
Payer: MEDICARE

## 2025-02-10 PROBLEM — N39.0 UTI (URINARY TRACT INFECTION): Status: ACTIVE | Noted: 2025-02-10

## 2025-02-10 PROBLEM — Z99.89 AMBULATES WITH CANE: Status: ACTIVE | Noted: 2025-02-10

## 2025-02-10 LAB — BACTERIA UR CULT: NO GROWTH

## 2025-02-10 PROCEDURE — 2500000004 HC RX 250 GENERAL PHARMACY W/ HCPCS (ALT 636 FOR OP/ED): Performed by: INTERNAL MEDICINE

## 2025-02-10 PROCEDURE — 2500000004 HC RX 250 GENERAL PHARMACY W/ HCPCS (ALT 636 FOR OP/ED): Performed by: HOSPITALIST

## 2025-02-10 PROCEDURE — 99232 SBSQ HOSP IP/OBS MODERATE 35: CPT | Performed by: INTERNAL MEDICINE

## 2025-02-10 PROCEDURE — 97161 PT EVAL LOW COMPLEX 20 MIN: CPT | Mod: GP

## 2025-02-10 PROCEDURE — 2500000001 HC RX 250 WO HCPCS SELF ADMINISTERED DRUGS (ALT 637 FOR MEDICARE OP): Performed by: HOSPITALIST

## 2025-02-10 PROCEDURE — 1210000001 HC SEMI-PRIVATE ROOM DAILY

## 2025-02-10 RX ORDER — GLUCOSAM/CHONDRO/HERB 149/HYAL 750-100 MG
1 TABLET ORAL DAILY
COMMUNITY

## 2025-02-10 RX ORDER — ACETAMINOPHEN 500 MG
1000 TABLET ORAL EVERY 6 HOURS PRN
COMMUNITY

## 2025-02-10 RX ORDER — CEFTRIAXONE 1 G/50ML
1 INJECTION, SOLUTION INTRAVENOUS EVERY 24 HOURS
Status: DISCONTINUED | OUTPATIENT
Start: 2025-02-10 | End: 2025-02-11 | Stop reason: HOSPADM

## 2025-02-10 RX ADMIN — FUROSEMIDE 80 MG: 80 TABLET ORAL at 08:36

## 2025-02-10 RX ADMIN — ATORVASTATIN CALCIUM 20 MG: 20 TABLET, FILM COATED ORAL at 20:27

## 2025-02-10 RX ADMIN — HYDRALAZINE HYDROCHLORIDE 50 MG: 50 TABLET ORAL at 20:27

## 2025-02-10 RX ADMIN — ISOSORBIDE MONONITRATE 30 MG: 30 TABLET, EXTENDED RELEASE ORAL at 08:36

## 2025-02-10 RX ADMIN — CARVEDILOL 6.25 MG: 6.25 TABLET, FILM COATED ORAL at 08:36

## 2025-02-10 RX ADMIN — SODIUM BICARBONATE 650 MG TABLET 325 MG: at 20:27

## 2025-02-10 RX ADMIN — CEFTRIAXONE SODIUM 1 G: 1 INJECTION, SOLUTION INTRAVENOUS at 14:16

## 2025-02-10 RX ADMIN — ACETAMINOPHEN 650 MG: 325 TABLET, FILM COATED ORAL at 23:33

## 2025-02-10 RX ADMIN — SODIUM BICARBONATE 650 MG TABLET 325 MG: at 08:36

## 2025-02-10 RX ADMIN — ACETAMINOPHEN 650 MG: 325 TABLET, FILM COATED ORAL at 17:54

## 2025-02-10 RX ADMIN — ACETAMINOPHEN 650 MG: 325 TABLET, FILM COATED ORAL at 10:51

## 2025-02-10 RX ADMIN — HYDRALAZINE HYDROCHLORIDE 50 MG: 50 TABLET ORAL at 08:36

## 2025-02-10 SDOH — ECONOMIC STABILITY: FOOD INSECURITY: WITHIN THE PAST 12 MONTHS, THE FOOD YOU BOUGHT JUST DIDN'T LAST AND YOU DIDN'T HAVE MONEY TO GET MORE.: NEVER TRUE

## 2025-02-10 SDOH — SOCIAL STABILITY: SOCIAL INSECURITY
WITHIN THE LAST YEAR, HAVE YOU BEEN RAPED OR FORCED TO HAVE ANY KIND OF SEXUAL ACTIVITY BY YOUR PARTNER OR EX-PARTNER?: NO

## 2025-02-10 SDOH — ECONOMIC STABILITY: FOOD INSECURITY: WITHIN THE PAST 12 MONTHS, YOU WORRIED THAT YOUR FOOD WOULD RUN OUT BEFORE YOU GOT THE MONEY TO BUY MORE.: NEVER TRUE

## 2025-02-10 SDOH — SOCIAL STABILITY: SOCIAL NETWORK
DO YOU BELONG TO ANY CLUBS OR ORGANIZATIONS SUCH AS CHURCH GROUPS, UNIONS, FRATERNAL OR ATHLETIC GROUPS, OR SCHOOL GROUPS?: YES

## 2025-02-10 SDOH — SOCIAL STABILITY: SOCIAL NETWORK: IN A TYPICAL WEEK, HOW MANY TIMES DO YOU TALK ON THE PHONE WITH FAMILY, FRIENDS, OR NEIGHBORS?: ONCE A WEEK

## 2025-02-10 SDOH — ECONOMIC STABILITY: HOUSING INSECURITY: IN THE LAST 12 MONTHS, WAS THERE A TIME WHEN YOU WERE NOT ABLE TO PAY THE MORTGAGE OR RENT ON TIME?: NO

## 2025-02-10 SDOH — SOCIAL STABILITY: SOCIAL INSECURITY: ARE YOU OR HAVE YOU BEEN THREATENED OR ABUSED PHYSICALLY, EMOTIONALLY, OR SEXUALLY BY ANYONE?: NO

## 2025-02-10 SDOH — SOCIAL STABILITY: SOCIAL INSECURITY: ARE YOU MARRIED, WIDOWED, DIVORCED, SEPARATED, NEVER MARRIED, OR LIVING WITH A PARTNER?: MARRIED

## 2025-02-10 SDOH — SOCIAL STABILITY: SOCIAL INSECURITY: HAS ANYONE EVER THREATENED TO HURT YOUR FAMILY OR YOUR PETS?: NO

## 2025-02-10 SDOH — SOCIAL STABILITY: SOCIAL INSECURITY: WITHIN THE LAST YEAR, HAVE YOU BEEN HUMILIATED OR EMOTIONALLY ABUSED IN OTHER WAYS BY YOUR PARTNER OR EX-PARTNER?: NO

## 2025-02-10 SDOH — SOCIAL STABILITY: SOCIAL NETWORK: HOW OFTEN DO YOU GET TOGETHER WITH FRIENDS OR RELATIVES?: ONCE A WEEK

## 2025-02-10 SDOH — ECONOMIC STABILITY: INCOME INSECURITY: IN THE PAST 12 MONTHS HAS THE ELECTRIC, GAS, OIL, OR WATER COMPANY THREATENED TO SHUT OFF SERVICES IN YOUR HOME?: YES

## 2025-02-10 SDOH — HEALTH STABILITY: MENTAL HEALTH
DO YOU FEEL STRESS - TENSE, RESTLESS, NERVOUS, OR ANXIOUS, OR UNABLE TO SLEEP AT NIGHT BECAUSE YOUR MIND IS TROUBLED ALL THE TIME - THESE DAYS?: NOT AT ALL

## 2025-02-10 SDOH — ECONOMIC STABILITY: HOUSING INSECURITY: IN THE PAST 12 MONTHS, HOW MANY TIMES HAVE YOU MOVED WHERE YOU WERE LIVING?: 1

## 2025-02-10 SDOH — SOCIAL STABILITY: SOCIAL INSECURITY: DO YOU FEEL UNSAFE GOING BACK TO THE PLACE WHERE YOU ARE LIVING?: NO

## 2025-02-10 SDOH — SOCIAL STABILITY: SOCIAL INSECURITY: WITHIN THE LAST YEAR, HAVE YOU BEEN AFRAID OF YOUR PARTNER OR EX-PARTNER?: NO

## 2025-02-10 SDOH — SOCIAL STABILITY: SOCIAL INSECURITY: ARE THERE ANY APPARENT SIGNS OF INJURIES/BEHAVIORS THAT COULD BE RELATED TO ABUSE/NEGLECT?: NO

## 2025-02-10 SDOH — ECONOMIC STABILITY: FOOD INSECURITY: HOW HARD IS IT FOR YOU TO PAY FOR THE VERY BASICS LIKE FOOD, HOUSING, MEDICAL CARE, AND HEATING?: NOT VERY HARD

## 2025-02-10 SDOH — SOCIAL STABILITY: SOCIAL NETWORK: HOW OFTEN DO YOU ATTEND MEETINGS OF THE CLUBS OR ORGANIZATIONS YOU BELONG TO?: NEVER

## 2025-02-10 SDOH — SOCIAL STABILITY: SOCIAL INSECURITY: WERE YOU ABLE TO COMPLETE ALL THE BEHAVIORAL HEALTH SCREENINGS?: YES

## 2025-02-10 SDOH — ECONOMIC STABILITY: HOUSING INSECURITY: AT ANY TIME IN THE PAST 12 MONTHS, WERE YOU HOMELESS OR LIVING IN A SHELTER (INCLUDING NOW)?: NO

## 2025-02-10 SDOH — SOCIAL STABILITY: SOCIAL NETWORK: HOW OFTEN DO YOU ATTEND CHURCH OR RELIGIOUS SERVICES?: NEVER

## 2025-02-10 SDOH — ECONOMIC STABILITY: TRANSPORTATION INSECURITY: IN THE PAST 12 MONTHS, HAS LACK OF TRANSPORTATION KEPT YOU FROM MEDICAL APPOINTMENTS OR FROM GETTING MEDICATIONS?: NO

## 2025-02-10 SDOH — SOCIAL STABILITY: SOCIAL INSECURITY: HAVE YOU HAD ANY THOUGHTS OF HARMING ANYONE ELSE?: NO

## 2025-02-10 SDOH — SOCIAL STABILITY: SOCIAL INSECURITY: DOES ANYONE TRY TO KEEP YOU FROM HAVING/CONTACTING OTHER FRIENDS OR DOING THINGS OUTSIDE YOUR HOME?: NO

## 2025-02-10 SDOH — SOCIAL STABILITY: SOCIAL INSECURITY: HAVE YOU HAD THOUGHTS OF HARMING ANYONE ELSE?: NO

## 2025-02-10 SDOH — SOCIAL STABILITY: SOCIAL INSECURITY: ABUSE: ADULT

## 2025-02-10 ASSESSMENT — PATIENT HEALTH QUESTIONNAIRE - PHQ9
2. FEELING DOWN, DEPRESSED OR HOPELESS: NOT AT ALL
1. LITTLE INTEREST OR PLEASURE IN DOING THINGS: NOT AT ALL
SUM OF ALL RESPONSES TO PHQ9 QUESTIONS 1 & 2: 0

## 2025-02-10 ASSESSMENT — ACTIVITIES OF DAILY LIVING (ADL)
WALKS IN HOME: INDEPENDENT
ASSISTIVE_DEVICE: WHEELCHAIR
HEARING - RIGHT EAR: FUNCTIONAL
LACK_OF_TRANSPORTATION: NO
PATIENT'S MEMORY ADEQUATE TO SAFELY COMPLETE DAILY ACTIVITIES?: YES
GROOMING: INDEPENDENT
BATHING: INDEPENDENT
ADEQUATE_TO_COMPLETE_ADL: YES
JUDGMENT_ADEQUATE_SAFELY_COMPLETE_DAILY_ACTIVITIES: YES
DRESSING YOURSELF: INDEPENDENT
ADL_ASSISTANCE: INDEPENDENT
FEEDING YOURSELF: INDEPENDENT
HEARING - LEFT EAR: FUNCTIONAL
TOILETING: INDEPENDENT
LACK_OF_TRANSPORTATION: NO

## 2025-02-10 ASSESSMENT — COGNITIVE AND FUNCTIONAL STATUS - GENERAL
PATIENT BASELINE BEDBOUND: NO
MOBILITY SCORE: 23
MOBILITY SCORE: 18
CLIMB 3 TO 5 STEPS WITH RAILING: A LITTLE
CLIMB 3 TO 5 STEPS WITH RAILING: A LOT
TURNING FROM BACK TO SIDE WHILE IN FLAT BAD: A LITTLE
PATIENT BASELINE BEDBOUND: NO
DAILY ACTIVITIY SCORE: 24
WALKING IN HOSPITAL ROOM: A LITTLE
MOVING TO AND FROM BED TO CHAIR: A LITTLE
MOBILITY SCORE: 24
STANDING UP FROM CHAIR USING ARMS: A LITTLE
MOBILITY SCORE: 23
DAILY ACTIVITIY SCORE: 24
CLIMB 3 TO 5 STEPS WITH RAILING: A LITTLE

## 2025-02-10 ASSESSMENT — PAIN - FUNCTIONAL ASSESSMENT
PAIN_FUNCTIONAL_ASSESSMENT: 0-10
PAIN_FUNCTIONAL_ASSESSMENT: FLACC (FACE, LEGS, ACTIVITY, CRY, CONSOLABILITY)

## 2025-02-10 ASSESSMENT — PAIN DESCRIPTION - LOCATION
LOCATION: KNEE
LOCATION: KNEE

## 2025-02-10 ASSESSMENT — LIFESTYLE VARIABLES
AUDIT-C TOTAL SCORE: 1
HOW OFTEN DO YOU HAVE A DRINK CONTAINING ALCOHOL: MONTHLY OR LESS
HOW OFTEN DO YOU HAVE 6 OR MORE DRINKS ON ONE OCCASION: NEVER
AUDIT-C TOTAL SCORE: 1
SUBSTANCE_ABUSE_PAST_12_MONTHS: NO
PRESCIPTION_ABUSE_PAST_12_MONTHS: NO
HOW MANY STANDARD DRINKS CONTAINING ALCOHOL DO YOU HAVE ON A TYPICAL DAY: 1 OR 2
SKIP TO QUESTIONS 9-10: 1

## 2025-02-10 ASSESSMENT — PAIN DESCRIPTION - ORIENTATION
ORIENTATION: RIGHT;LEFT
ORIENTATION: RIGHT;LEFT

## 2025-02-10 ASSESSMENT — PAIN SCALES - GENERAL
PAINLEVEL_OUTOF10: 1
PAINLEVEL_OUTOF10: 8
PAINLEVEL_OUTOF10: 3
PAINLEVEL_OUTOF10: 1
PAINLEVEL_OUTOF10: 3
PAINLEVEL_OUTOF10: 3
PAINLEVEL_OUTOF10: 0 - NO PAIN

## 2025-02-10 ASSESSMENT — PAIN DESCRIPTION - DESCRIPTORS
DESCRIPTORS: ACHING
DESCRIPTORS: ACHING

## 2025-02-10 NOTE — ASSESSMENT & PLAN NOTE
From cystoscopy on Friday.  Hematuria is improving.  Urology on consult and pending note from today but seen last night; continue irrigation and hold aspirin for likely TURBT in the future  Will monitor CBC, but no evidence of worsening anemia.

## 2025-02-10 NOTE — CONSULTS
Reason For Consult  Hematuria    History Of Present Illness  Tom Haas is a 79 y.o. male presenting with hematuria and urinary retention.  Pt. Had been seen in the hospital in 3/2024 and 2024.  A CT showed him to have right renal atrophy. He had no abnormalities on the left side.  He was supposed to come in for cystoscopy but delayed this until 2025.  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    He then developed significant hematuria yesterday and difficulty voiding.  He came to the ER and a Rodriguez catheter was placed, he was discharged but the catheter clotted off.  He came back to the ER and had a 3-way catheter placed.  The urine remains bloody.  He is not having any further pain.  His henatocrit is 33.1.     Past Medical History  He has a past medical history of Chronic kidney disease, Heart disease, Hypertension, Occlusion and stenosis of unspecified carotid artery, Other conditions influencing health status, Personal history of other diseases of the circulatory system, Personal history of other diseases of the circulatory system, and Personal history of other endocrine, nutritional and metabolic disease.    Surgical History  He has a past surgical history that includes Cardiac electrophysiology procedure (Left, 10/01/2024) and Cataract extraction, bilateral.     Social History  He reports that he has never smoked. He has never used smokeless tobacco. He reports current alcohol use. He reports that he does not use drugs.    Family History  Family History   Problem Relation Name Age of Onset    Alcohol abuse Father           in his 50s related to complications of alcohol abuse        Allergies  Patient has no known allergies.    Review of Systems  As per admission HPI     Physical Exam  Awake, alert and oriented  HEENT:  Normal EOM  Neck:  Supple  Lungs:  Normal respiratory pattern  Lungs;  Normal respiratory  "pattern.  Abd:  Soft and non-tender  :  Rodriguez catheter in place     Last Recorded Vitals  Blood pressure 118/72, pulse 86, temperature 36.3 °C (97.3 °F), temperature source Temporal, resp. rate 19, height 1.651 m (5' 5\"), weight 76.2 kg (168 lb), SpO2 96%.    Relevant Results  Results for orders placed or performed during the hospital encounter of 02/09/25 (from the past 24 hours)   Urinalysis with Reflex Culture and Microscopic   Result Value Ref Range    Color, Urine Light-Red (N) Light-Yellow, Yellow, Dark-Yellow    Appearance, Urine Ex.Turbid (N) Clear    Specific Gravity, Urine 1.016 1.005 - 1.035    pH, Urine 6.5 5.0, 5.5, 6.0, 6.5, 7.0, 7.5, 8.0    Protein, Urine 200 (2+) (A) NEGATIVE, 10 (TRACE), 20 (TRACE) mg/dL    Glucose, Urine Normal Normal mg/dL    Blood, Urine OVER (3+) (A) NEGATIVE mg/dL    Ketones, Urine NEGATIVE NEGATIVE mg/dL    Bilirubin, Urine NEGATIVE NEGATIVE mg/dL    Urobilinogen, Urine Normal Normal mg/dL    Nitrite, Urine NEGATIVE NEGATIVE    Leukocyte Esterase, Urine 250 Cori/uL (A) NEGATIVE   Extra Urine Gray Tube   Result Value Ref Range    Extra Tube Hold for add-ons.    Microscopic Only, Urine   Result Value Ref Range    WBC, Urine 21-50 (A) 1-5, NONE /HPF    RBC, Urine >20 (A) NONE, 1-2, 3-5 /HPF    Bacteria, Urine 1+ (A) NONE SEEN /HPF   CBC and Auto Differential   Result Value Ref Range    WBC 6.6 4.4 - 11.3 x10*3/uL    nRBC 0.0 0.0 - 0.0 /100 WBCs    RBC 3.67 (L) 4.50 - 5.90 x10*6/uL    Hemoglobin 10.9 (L) 13.5 - 17.5 g/dL    Hematocrit 33.1 (L) 41.0 - 52.0 %    MCV 90 80 - 100 fL    MCH 29.7 26.0 - 34.0 pg    MCHC 32.9 32.0 - 36.0 g/dL    RDW 18.4 (H) 11.5 - 14.5 %    Platelets 111 (L) 150 - 450 x10*3/uL    Neutrophils % 76.3 40.0 - 80.0 %    Immature Granulocytes %, Automated 0.2 0.0 - 0.9 %    Lymphocytes % 11.1 13.0 - 44.0 %    Monocytes % 9.7 2.0 - 10.0 %    Eosinophils % 2.4 0.0 - 6.0 %    Basophils % 0.3 0.0 - 2.0 %    Neutrophils Absolute 5.03 1.60 - 5.50 x10*3/uL    " Immature Granulocytes Absolute, Automated 0.01 0.00 - 0.50 x10*3/uL    Lymphocytes Absolute 0.73 (L) 0.80 - 3.00 x10*3/uL    Monocytes Absolute 0.64 0.05 - 0.80 x10*3/uL    Eosinophils Absolute 0.16 0.00 - 0.40 x10*3/uL    Basophils Absolute 0.02 0.00 - 0.10 x10*3/uL   Basic metabolic panel   Result Value Ref Range    Glucose 105 (H) 74 - 99 mg/dL    Sodium 130 (L) 136 - 145 mmol/L    Potassium 4.4 3.5 - 5.3 mmol/L    Chloride 99 98 - 107 mmol/L    Bicarbonate 19 (L) 21 - 32 mmol/L    Anion Gap 16 10 - 20 mmol/L    Urea Nitrogen 77 (H) 6 - 23 mg/dL    Creatinine 3.30 (H) 0.50 - 1.30 mg/dL    eGFR 18 (L) >60 mL/min/1.73m*2    Calcium 9.4 8.6 - 10.3 mg/dL    No results found.      Assessment/Plan     Hematuria:  This is secondary to the irritation of his bladder tumor after cystoscopy 2 days ago in addition to the use of aspirin.  Would keep the Rodriguez in for now and use continuous bladder irrigation as needed.. Would hold the aspirin as he will need a TURBT in the near future    Urinary Retention:  Likely clot retention.  Keep the Rodriguez in until the urine has cleared          Terell Castanon MD

## 2025-02-10 NOTE — PROGRESS NOTES
Physical Therapy    Physical Therapy Evaluation    Patient Name: Tom Haas  MRN: [id number]  Department: [address]  Room: [facility]  Today's Date: 2/10/2025   Time Calculation  Start Time: 1310  Stop Time: 1330  Time Calculation (min): 20 min    Assessment/Plan   PT Assessment  PT Assessment Results: Decreased strength, Decreased endurance, Impaired balance, Decreased mobility, Decreased coordination, Decreased safety awareness, Pain  Rehab Prognosis: Good  Barriers to Discharge Home: No anticipated barriers  Evaluation/Treatment Tolerance: Patient tolerated treatment well, Patient limited by pain  Medical Staff Made Aware: Yes  Strengths: Ability to acquire knowledge, Premorbid level of function, Support of extended family/friends, Support and attitude of living partners  Barriers to Participation: Comorbidities  End of Session Communication: Bedside nurse  Assessment Comment: Pt demonstrates impaired functional mobility from baseline level; pt with pain, BLE weakness, impaired balance, and decreased overall activity tolerance; would benefit from continued skilled PT services during hospital stay to address deficits and maximize functional mobility outcomes upon d/c.  End of Session Patient Position: Bed, 3 rail up, Alarm on (seated on EOB; family present)  IP OR SWING BED PT PLAN  Inpatient or Swing Bed: Inpatient  PT Plan  Treatment/Interventions: Bed mobility, Transfer training, Gait training, Stair training, Balance training, Neuromuscular re-education, Strengthening, Endurance training, Therapeutic exercise, Therapeutic activity  PT Plan: Ongoing PT  PT Frequency: 3 times per week  PT Discharge Recommendations: Low intensity level of continued care  Equipment Recommended upon Discharge: Wheeled walker  PT Recommended Transfer Status: Assistive device, Contact guard  PT - OK to Discharge: Yes    Subjective   General Visit Information:  General  Reason for Referral: impaired functional mobility d/t  hematuria  Referred By: Dr. Jerrod DO  Past Medical History Relevant to Rehab: NSTEMI, PNA, CHF, CKD, HTN, aorti valve stenosis, CAD, ICD, acute cystitis with hematuria.  Family/Caregiver Present: Yes  Caregiver Feedback: spouse, daughter and NATALIE present  Patient Position Received: Bed, 3 rail up, Alarm off, not on at start of session  Preferred Learning Style: verbal  General Comment: Pt is an 79 year-old M with h/o cystoscopy on 2/7/25; admitted from home with c/o hematuria.  Home Living:  Home Living  Type of Home: House  Lives With: Spouse, Adult children (spouse and daughter)  Home Adaptive Equipment: Cane  Home Layout: Two level, Stairs to alternate level with rails  Alternate Level Stairs-Rails: Right  Alternate Level Stairs-Number of Steps: 12  Home Access: Stairs to enter with rails  Entrance Stairs-Rails: Right  Entrance Stairs-Number of Steps: 5  Bathroom Shower/Tub: Walk-in shower  Bathroom Toilet: Standard  Bathroom Equipment: Grab bars in shower  Bathroom Accessibility: pt showers in basement in walk-in shower (flight with handrail to basement)  Prior Level of Function:  Prior Function Per Pt/Caregiver Report  Level of [place]: Independent with ADLs and functional transfers, Independent with homemaking with ambulation  Receives Help From: Family  ADL Assistance: Independent  Homemaking Assistance: Independent  Ambulatory Assistance: Independent (mod indep with SC)  Vocational: Retired  Prior Function Comments: denies h/o falls  Precautions:  Precautions  Hearing/Visual Limitations: none  Medical Precautions: Fall precautions     Objective   Pain:  Pain Assessment  Pain Assessment: FLACC (Face, Legs, Activity, Cry, Consolability)  0-10 (Numeric) Pain Score: 3  Pain Type: Chronic pain  Pain Location: Knee  Pain Orientation: Right, Left  Pain Interventions: Ambulation/increased activity, Repositioned  Response to Interventions: Absence of non-verbal indicators of  pain  Cognition:  Cognition  Overall Cognitive Status: Within Functional Limits  Orientation Level: Oriented X4    General Assessments:  General Observation  General Observation: cooperative; encouragement to participate     Activity Tolerance  Endurance: Decreased tolerance for upright activites  Activity Tolerance Comments: fair; limited d/t c/o stiffness/pain in B knees with movement    Sensation  Light Touch: No apparent deficits  Sensation Comment: pt denies paresthesias    Strength  Strength Comments: BLE > 3+/5  Strength  Strength Comments: BLE > 3+/5    Coordination  Movements are Fluid and Coordinated: No  Coordination Comment: slower, antalgic movements    Postural Control  Postural Control: Impaired  Posture Comment: forward flexion at hips    Static Sitting Balance  Static Sitting-Balance Support: Feet supported  Static Sitting-Level of Assistance: Independent    Static Standing Balance  Static Standing-Balance Support: Bilateral upper extremity supported  Static Standing-Level of Assistance: Close supervision  Functional Assessments:       Bed Mobility  Bed Mobility: Yes  Bed Mobility 1  Bed Mobility 1: Supine to sitting  Level of Assistance 1: Close supervision  Bed Mobility Comments 1: HOB elevated, increased time/effort    Transfers  Transfer: Yes  Transfer 1  Transfer From 1: Sit to, Stand to  Transfer to 1: Sit, Stand  Technique 1: Sit to stand, Stand to sit  Transfer Device 1: Walker  Transfer Level of Assistance 1: Close supervision  Trials/Comments 1: increased time/effort to complete; intermittent cueing for sequencing/safety    Ambulation/Gait Training  Ambulation/Gait Training Performed: Yes  Ambulation/Gait Training 1  Surface 1: Level tile  Device 1: Rolling walker  Assistance 1: Close supervision  Quality of Gait 1: Decreased step length, Diminished heel strike, Forward flexed posture  Comments/Distance (ft) 1: 5' x 2; distance limited d/t pain/stiffness in B knees    Stairs  Stairs:  No    Extremity/Trunk Assessments:  RLE   RLE : Within Functional Limits  LLE   LLE : Within Functional Limits  Outcome Measures:  [facility] Basic Mobility  Turning from your back to your side while in a flat bed without using bedrails: None  Moving from lying on your back to sitting on the side of a flat bed without using bedrails: A little  Moving to and from bed to chair (including a wheelchair): A little  Standing up from a chair using your arms (e.g. wheelchair or bedside chair): A little  To walk in hospital room: A little  Climbing 3-5 steps with railing: A lot  Basic Mobility - Total Score: 18    Encounter Problems       Encounter Problems (Active)       PT STG Problem       Patient will transfer supine to sit and sit to supine with independent assist to facilitate mobility. (Progressing)       Start:  02/10/25    Expected End:  03/10/25            Patient will transfer sit to stand and stand to sit with modified independent assist with use of straight cane to facilitate mobility. (Progressing)       Start:  02/10/25    Expected End:  03/10/25            Patient will amb 50 feet straight cane device including two turns on even surface with independent assist to facilitate safe mobility.   (Progressing)       Start:  02/10/25    Expected End:  03/10/25            Patient will negotiate 12 stairs with one rail(s) and independent} assist with straight cane device for in home and community. (Not Progressing)       Start:  02/10/25    Expected End:  03/10/25                   Education Documentation  Mobility Training, taught by Lorie Kwon PT at 2/10/2025  2:05 PM.  Learner: Patient  Readiness: Acceptance  Method: Explanation, Demonstration  Response: Demonstrated Understanding, Needs Reinforcement  Comment: educated on PT POC and safety/sequencing during functional mobility training.    Education Comments  No comments found.

## 2025-02-10 NOTE — PROGRESS NOTES
Pharmacy Medication History Review    Tom Haas is a 79 y.o. male admitted for Gross hematuria. Pharmacy reviewed the patient's [id number] medications and allergies for accuracy.    Medications ADDED:  Fish oil 1000mg  Acetaminophen 500mg   Medications CHANGED:  Ferrous sulfate 324 EC - every other day - constipates patient-  not compliant  Medications REMOVED:   None      The list below reflects the updated PTA list. Comments regarding how patient may be taking medications differently can be found in the Admit Orders Activity  Prior to Admission Medications   Prescriptions Last Dose Informant   acetaminophen (Tylenol) 500 mg tablet Past Week Self   Sig: Take 2 tablets (1,000 mg) by mouth every 6 hours if needed for mild pain (1 - 3).   aspirin 81 mg chewable tablet 2/8/2025 Morning Self   Sig: Chew 1 tablet (81 mg) once daily. Do not start before March 14, 2024.   atorvastatin (Lipitor) 20 mg tablet 2/8/2025 Evening Self   Sig: Take 1 tablet (20 mg) by mouth once daily at bedtime.   carvedilol (Coreg) 6.25 mg tablet 2/8/2025 Self   Sig: Take 1 tablet (6.25 mg) by mouth 2 times a day.   cholecalciferol (Vitamin D-3) 50 MCG (2000 UT) tablet 2/8/2025 Self   Sig: Take 1 tablet (2,000 Units) by mouth once daily. Do not start before March 14, 2024.   ferrous sulfate 324 mg (65 mg elemental iron) EC tablet (delayed release) Past Week Self   Sig: Take 1 tablet (65 mg) by mouth once daily with breakfast.   Patient taking differently: Take 1 tablet (65 mg) by mouth every other day.   furosemide (Lasix) 80 mg tablet Past Week Self   Sig: Take 1 tablet (80 mg) by mouth once daily.   hydrALAZINE (Apresoline) 50 mg tablet 2/9/2025 Self   Sig: Take 1 tablet (50 mg) by mouth 2 times a day.   isosorbide mononitrate ER (Imdur) 30 mg 24 hr tablet  Self   Sig: Take 1 tablet (30 mg) by mouth once daily. Do not crush or chew.   omega 3-dha-epa-fish oil 1,000 (120-180) mg capsule Past Week Self   Sig: Take 1 capsule (1,000  mg) by mouth once daily.   sodium bicarbonate 325 mg tablet Past Week Self   Sig: Take 1 tablet (325 mg) by mouth 2 times a day. 1/2 tablet 4 times a day      Facility-Administered Medications: None        The list below reflects the updated allergy list. Please review each documented allergy for additional clarification and justification.  Allergies  Reviewed by Kasey Baxter CPhT on 2/10/2025   No Known Allergies         Pharmacy has been updated to [facility].    Sources used to complete the med history include dispense history, PTA medication list, patient/spouse interview. Informants are fair historians.    Below are additional concerns with the patient's PTA list.  None     Kasey Baxter CPhT-Adv  Please reach out via [employer] Secure Chat for questions

## 2025-02-10 NOTE — PROGRESS NOTES
Tom Haas is a 79 y.o. male on day 1 of admission presenting with Gross hematuria.      Subjective   No new fevers or chills.  No abdominal pain.  Wife states that he was up in his chair most of the morning       Objective     Last Recorded Vitals  /65 (BP Location: Left arm, Patient Position: Sitting)   Pulse 92   Temp 37.6 °C (99.7 °F) (Oral)   Resp 18   Wt 76.2 kg (168 lb)   SpO2 96%   Intake/Output last 3 Shifts:    Intake/Output Summary (Last 24 hours) at 2/10/2025 1344  Last data filed at 2/10/2025 1300  Gross per 24 hour   Intake 300 ml   Output --   Net 300 ml       Admission Weight  Weight: 76.2 kg (168 lb) (02/09/25 0424)    Daily Weight  02/09/25 : 76.2 kg (168 lb)    Image Results  ECG 12 lead (Clinic Performed)  Sinus rhythm with 1st degree AV block with occasional Premature ventricular complexes  Possible Left atrial enlargement  Left axis deviation  Left ventricular hypertrophy with QRS widening and repolarization abnormality  Inferior infarct , age undetermined  Abnormal ECG  When compared with ECG of 26-JUN-2024 11:14,  Significant changes have occurred      Physical Exam  Constitutional:       General: He is not in acute distress.  HENT:      Right Ear: External ear normal.      Left Ear: External ear normal.      Mouth/Throat:      Mouth: Mucous membranes are moist.   Eyes:      Conjunctiva/sclera: Conjunctivae normal.   Cardiovascular:      Rate and Rhythm: Normal rate.   Abdominal:      Palpations: Abdomen is soft.      Tenderness: There is no abdominal tenderness.   Genitourinary:     Comments: Rodriguez catheter with blood-tinged urine in collecting tube  Skin:     General: Skin is warm.   Neurological:      General: No focal deficit present.      Mental Status: He is alert.   Psychiatric:         Mood and Affect: Mood normal.         Relevant Results               Assessment/Plan          This patient has a urinary catheter   Reason for the urinary catheter remaining today? urinary  retention/bladder outlet obstruction, acute or chronic          Assessment & Plan  Gross hematuria  From cystoscopy on Friday.  Hematuria is improving.  Urology on consult and pending note from today but seen last night; continue irrigation and hold aspirin for likely TURBT in the future  Will monitor CBC, but no evidence of worsening anemia.  UTI (urinary tract infection)  Urinalysis is equivocal, but with the recent orientation fully, feel safer to cover with ceftriaxone and follow-up with cultures.  Start ceftriaxone 1 g  Ambulates with cane  Physical therapy Occupational Therapy.    Disposition: To be seen by physical therapy and Occupational Therapy, and will need hematuria to clear before discharge.              Craig Elder, DO

## 2025-02-10 NOTE — RESEARCH NOTES
Artificial Intelligence Monitoring in Nursing (AIMS Nursing) Study    Principle Investigator - Dr. Petr Hugo  Research Coordinator - MARIANNE Garces     Patient Name - Tom Haas  Date - 2/10/2025 1:53 PM  Location - [facility]    Tom Haas was approached by MARIANNE Garces to talk about participating in the AIMS Nursing Study. The consent form was signed by the patient and they were given a copy for their records. Study protocol was followed and patient was given study contact information.     MARIANNE Garces

## 2025-02-10 NOTE — CARE PLAN
Problem: Pain - Adult  Goal: Verbalizes/displays adequate comfort level or baseline comfort level  Outcome: Progressing   The patient's goals for the shift include NO FALLS    The clinical goals for the shift include Manage urine drainage and bleeding    O

## 2025-02-10 NOTE — ASSESSMENT & PLAN NOTE
Urinalysis is equivocal, but with the recent orientation fully, feel safer to cover with ceftriaxone and follow-up with cultures.  Start ceftriaxone 1 g

## 2025-02-11 VITALS
BODY MASS INDEX: 27.99 KG/M2 | SYSTOLIC BLOOD PRESSURE: 135 MMHG | OXYGEN SATURATION: 97 % | WEIGHT: 168 LBS | TEMPERATURE: 98.1 F | HEART RATE: 80 BPM | RESPIRATION RATE: 17 BRPM | HEIGHT: 65 IN | DIASTOLIC BLOOD PRESSURE: 75 MMHG

## 2025-02-11 LAB
ALBUMIN SERPL BCP-MCNC: 3.7 G/DL (ref 3.4–5)
ANION GAP SERPL CALCULATED.3IONS-SCNC: 16 MMOL/L (ref 10–20)
BASOPHILS # BLD AUTO: 0.03 X10*3/UL (ref 0–0.1)
BASOPHILS NFR BLD AUTO: 0.4 %
BUN SERPL-MCNC: 78 MG/DL (ref 6–23)
CALCIUM SERPL-MCNC: 8.8 MG/DL (ref 8.6–10.3)
CHLORIDE SERPL-SCNC: 98 MMOL/L (ref 98–107)
CO2 SERPL-SCNC: 19 MMOL/L (ref 21–32)
CREAT SERPL-MCNC: 3.4 MG/DL (ref 0.5–1.3)
EGFRCR SERPLBLD CKD-EPI 2021: 18 ML/MIN/1.73M*2
EOSINOPHIL # BLD AUTO: 0.1 X10*3/UL (ref 0–0.4)
EOSINOPHIL NFR BLD AUTO: 1.5 %
ERYTHROCYTE [DISTWIDTH] IN BLOOD BY AUTOMATED COUNT: 18.2 % (ref 11.5–14.5)
GLUCOSE SERPL-MCNC: 90 MG/DL (ref 74–99)
HCT VFR BLD AUTO: 29.7 % (ref 41–52)
HGB BLD-MCNC: 9.8 G/DL (ref 13.5–17.5)
IMM GRANULOCYTES # BLD AUTO: 0.03 X10*3/UL (ref 0–0.5)
IMM GRANULOCYTES NFR BLD AUTO: 0.4 % (ref 0–0.9)
LYMPHOCYTES # BLD AUTO: 0.6 X10*3/UL (ref 0.8–3)
LYMPHOCYTES NFR BLD AUTO: 8.8 %
MCH RBC QN AUTO: 29.6 PG (ref 26–34)
MCHC RBC AUTO-ENTMCNC: 33 G/DL (ref 32–36)
MCV RBC AUTO: 90 FL (ref 80–100)
MONOCYTES # BLD AUTO: 0.97 X10*3/UL (ref 0.05–0.8)
MONOCYTES NFR BLD AUTO: 14.3 %
NEUTROPHILS # BLD AUTO: 5.06 X10*3/UL (ref 1.6–5.5)
NEUTROPHILS NFR BLD AUTO: 74.6 %
NRBC BLD-RTO: 0 /100 WBCS (ref 0–0)
PHOSPHATE SERPL-MCNC: 4.4 MG/DL (ref 2.5–4.9)
PLATELET # BLD AUTO: 93 X10*3/UL (ref 150–450)
POTASSIUM SERPL-SCNC: 4 MMOL/L (ref 3.5–5.3)
RBC # BLD AUTO: 3.31 X10*6/UL (ref 4.5–5.9)
SODIUM SERPL-SCNC: 129 MMOL/L (ref 136–145)
WBC # BLD AUTO: 6.8 X10*3/UL (ref 4.4–11.3)

## 2025-02-11 PROCEDURE — 97110 THERAPEUTIC EXERCISES: CPT | Mod: GP

## 2025-02-11 PROCEDURE — 99239 HOSP IP/OBS DSCHRG MGMT >30: CPT | Performed by: INTERNAL MEDICINE

## 2025-02-11 PROCEDURE — 80069 RENAL FUNCTION PANEL: CPT | Performed by: INTERNAL MEDICINE

## 2025-02-11 PROCEDURE — 2500000001 HC RX 250 WO HCPCS SELF ADMINISTERED DRUGS (ALT 637 FOR MEDICARE OP): Performed by: HOSPITALIST

## 2025-02-11 PROCEDURE — 85025 COMPLETE CBC W/AUTO DIFF WBC: CPT | Performed by: INTERNAL MEDICINE

## 2025-02-11 PROCEDURE — 97530 THERAPEUTIC ACTIVITIES: CPT | Mod: GP

## 2025-02-11 PROCEDURE — 97530 THERAPEUTIC ACTIVITIES: CPT | Mod: GO

## 2025-02-11 PROCEDURE — 2500000004 HC RX 250 GENERAL PHARMACY W/ HCPCS (ALT 636 FOR OP/ED): Performed by: HOSPITALIST

## 2025-02-11 PROCEDURE — 36415 COLL VENOUS BLD VENIPUNCTURE: CPT | Performed by: INTERNAL MEDICINE

## 2025-02-11 PROCEDURE — 97165 OT EVAL LOW COMPLEX 30 MIN: CPT | Mod: GO

## 2025-02-11 RX ADMIN — ACETAMINOPHEN 650 MG: 325 TABLET, FILM COATED ORAL at 08:30

## 2025-02-11 RX ADMIN — SODIUM BICARBONATE 650 MG TABLET 325 MG: at 08:26

## 2025-02-11 RX ADMIN — HYDRALAZINE HYDROCHLORIDE 50 MG: 50 TABLET ORAL at 08:26

## 2025-02-11 RX ADMIN — ISOSORBIDE MONONITRATE 30 MG: 30 TABLET, EXTENDED RELEASE ORAL at 08:26

## 2025-02-11 RX ADMIN — CARVEDILOL 6.25 MG: 6.25 TABLET, FILM COATED ORAL at 08:26

## 2025-02-11 RX ADMIN — FUROSEMIDE 80 MG: 80 TABLET ORAL at 08:26

## 2025-02-11 ASSESSMENT — COGNITIVE AND FUNCTIONAL STATUS - GENERAL
TOILETING: A LITTLE
CLIMB 3 TO 5 STEPS WITH RAILING: A LOT
STANDING UP FROM CHAIR USING ARMS: A LITTLE
DRESSING REGULAR UPPER BODY CLOTHING: A LITTLE
DAILY ACTIVITIY SCORE: 17
DRESSING REGULAR LOWER BODY CLOTHING: A LOT
MOBILITY SCORE: 18
TURNING FROM BACK TO SIDE WHILE IN FLAT BAD: A LITTLE
WALKING IN HOSPITAL ROOM: A LITTLE
HELP NEEDED FOR BATHING: A LOT
PERSONAL GROOMING: A LITTLE
MOVING TO AND FROM BED TO CHAIR: A LITTLE

## 2025-02-11 ASSESSMENT — PAIN - FUNCTIONAL ASSESSMENT
PAIN_FUNCTIONAL_ASSESSMENT: 0-10

## 2025-02-11 ASSESSMENT — ACTIVITIES OF DAILY LIVING (ADL)
BATHING_ASSISTANCE: MODERATE
ADL_ASSISTANCE: INDEPENDENT

## 2025-02-11 ASSESSMENT — PAIN SCALES - GENERAL
PAINLEVEL_OUTOF10: 3
PAINLEVEL_OUTOF10: 0 - NO PAIN
PAINLEVEL_OUTOF10: 3
PAINLEVEL_OUTOF10: 1

## 2025-02-11 ASSESSMENT — PAIN DESCRIPTION - ORIENTATION: ORIENTATION: LEFT;RIGHT

## 2025-02-11 ASSESSMENT — PAIN DESCRIPTION - DESCRIPTORS: DESCRIPTORS: ACHING

## 2025-02-11 ASSESSMENT — PAIN DESCRIPTION - LOCATION: LOCATION: KNEE

## 2025-02-11 NOTE — PROGRESS NOTES
"Tom Haas is a 79 y.o. male on day 2 of admission presenting with Gross hematuria.    Subjective   He feels well.  Catheter was removed this AM       Objective     Physical Exam  Awake and alert.  Normal respiratory pattern  Last Recorded Vitals  Blood pressure 135/75, pulse 80, temperature 36.7 °C (98.1 °F), temperature source Oral, resp. rate 17, height 1.651 m (5' 5\"), weight 76.2 kg (168 lb), SpO2 97%.  Intake/Output last 3 Shifts:  I/O last 3 completed shifts:  In: 300 (3.9 mL/kg) [P.O.:300]  Out: 1050 (13.8 mL/kg) [Urine:1050 (0.4 mL/kg/hr)]  Weight: 76.2 kg     Relevant Results                              Assessment/Plan   Assessment & Plan  Gross hematuria  Blood has cleared.  Bleeding likely a combination of his tumor, irritation from the cystoscopy and the use of aspirin.  The aspirin has been stopped and the urine has cleared.  Hematocrit is stable.  Catheter removed this AM.  If able to void he can be discharged home.    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  He last took aspirin 2 days ago.  Will plan a TURBT next week.  UTI (urinary tract infection)  No UTI.. Culture was negative  Ambulates with cydney Castanon MD      "

## 2025-02-11 NOTE — ASSESSMENT & PLAN NOTE
Blood has cleared.  Bleeding likely a combination of his tumor, irritation from the cystoscopy and the use of aspirin.  The aspirin has been stopped and the urine has cleared.  Hematocrit is stable.  Will remove the catheter tomorrow for a voiding trial.  If able to void he can be discharged home.    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  He last took aspirin 2 days ago.  Will plan a TURBT next week.

## 2025-02-11 NOTE — DISCHARGE INSTRUCTIONS
Do not take aspirin as discussed with urology.  Please follow-up with Dr. Castanon this coming week.

## 2025-02-11 NOTE — CARE PLAN
Problem: Safety - Adult  Goal: Free from fall injury  Outcome: Progressing   The patient's goals for the shift include NO FALLS    The clinical goals for the shift include Pain control, maintain normal wake/ sleep cycle

## 2025-02-11 NOTE — NURSING NOTE
Assumed care.  Seen patient on bed in high fowlers position, awake, in no distress and no discomfort noted.  Rodriguez's catheter secured and attached to urine bag.  Safety measures ensured and call light within reach.

## 2025-02-11 NOTE — DISCHARGE SUMMARY
Discharge Diagnosis  Hematuria    Issues Requiring Follow-Up  Follow-up with Dr. Castanon for TURBT    Discharge Meds     Medication List      CHANGE how you take these medications     ferrous sulfate 324 mg (65 mg elemental iron) EC tablet (delayed   release); Take 1 tablet (65 mg) by mouth once daily with breakfast.; What   changed: when to take this     CONTINUE taking these medications     acetaminophen 500 mg tablet; Commonly known as: Tylenol   atorvastatin 20 mg tablet; Commonly known as: Lipitor; Take 1 tablet (20   mg) by mouth once daily at bedtime.   carvedilol 6.25 mg tablet; Commonly known as: Coreg; Take 1 tablet (6.25   mg) by mouth 2 times a day.   cholecalciferol 50 MCG (2000 UT) tablet; Commonly known as: Vitamin D-3;   Take 1 tablet (2,000 Units) by mouth once daily. Do not start before March 14, 2024.   furosemide 80 mg tablet; Commonly known as: Lasix; Take 1 tablet (80 mg)   by mouth once daily.   hydrALAZINE 50 mg tablet; Commonly known as: Apresoline; Take 1 tablet   (50 mg) by mouth 2 times a day.   isosorbide mononitrate ER 30 mg 24 hr tablet; Commonly known as: Imdur;   Take 1 tablet (30 mg) by mouth once daily. Do not crush or chew.   omega 3-dha-epa-fish oil 1,000 (120-180) mg capsule   sodium bicarbonate 325 mg tablet     STOP taking these medications     aspirin 81 mg chewable tablet       Test Results Pending At Discharge  Pending Labs       No current pending labs.            Hospital Course   -year-old male past medical history of chronic kidney disease stage IV, CAD, AICD presenting with hematuria.  Patient had a cystoscopy with Dr. Castanon on February 7, developing hematuria the day after.  Patient presented to the emergency department and a three-way Rodriguez catheter was placed with irrigation.  Seen by urology, and the hematuria did clear over the next 48 hours or so.  Hemoglobin did drop by 1 g but well above transfusion level.  Question of a urinary tract infection but urine  culture was unremarkable and ceftriaxone was discontinued.  Patient is discharged to home with instructions to not restart his aspirin as Dr. Castanon will do a TURBT after the weekend.  Rodriguez catheter was removed and patient was able to void well before discharge      Pertinent Physical Exam At Time of Discharge  Physical Exam  General: Elderly male, sitting up in chair, nontoxic  Eyes: Clear sclera  Neck: No JVD  : No Rordiguez.  Abdomen: Sulfamycin  Outpatient Follow-Up  Future Appointments   Date Time Provider Department Center   4/30/2025 10:00 AM Mansoor Hinkle MD [id number] [facility]   5/7/2025  1:30 PM Sudha Fernandez PA-C [id number] [facility]   7/24/2025  2:30 PM Kelvin Murphy MD [id number] [facility]         Craig Elder DO

## 2025-02-11 NOTE — PROGRESS NOTES
Occupational Therapy    Evaluation/Treatment    Patient Name: Tom Haas  MRN: [id number]  Department: [address]  Room: 421/421-B  Today's Date: 02/11/25  Time Calculation  Start Time: 0835  Stop Time: 0858  Time Calculation (min): 23 min       Assessment:  OT Assessment: Referral received, chart reviewed, and evaluation complete.  Presents with impaired balance and mild weakness.  Would benefit from acute OT services.  Recommend low intensity upon discharge.  Prognosis: Good  Barriers to Discharge Home: Physical needs  Physical Needs: Stair navigation into home limited by function/safety  Evaluation/Treatment Tolerance: Patient tolerated treatment well  Medical Staff Made Aware: Yes  End of Session Communication: Bedside nurse  End of Session Patient Position: Up in chair, Alarm on  Prognosis: Good  Evaluation/Treatment Tolerance: Patient tolerated treatment well  Medical Staff Made Aware: Yes  Barriers to Participation: Comorbidities  Plan:  Treatment Interventions: ADL retraining, Functional transfer training, Patient/family training, Neuromuscular reeducation, Compensatory technique education  OT Frequency: 3 times per week  OT Discharge Recommendations: Low intensity level of continued care  Equipment Recommended upon Discharge: Wheeled walker  OT Recommended Transfer Status: Minimal assist, Assist of 1  OT - OK to Discharge: Yes  Treatment Interventions: ADL retraining, Functional transfer training, Patient/family training, Neuromuscular reeducation, Compensatory technique education    Subjective   Current Problem:  1. Gross hematuria        2. Rodriguez catheter problem, initial encounter ([facility])          General:   OT Received On: 02/11/25  General  Reason for Referral: decreased functional status due to gross hematuria  Referred By: Craig Elder DO  Past Medical History Relevant to Rehab: CKD4, CAD, heart failure, ICD placement, HTN  Family/Caregiver Present: Yes  Caregiver Feedback: Spouse is present  and states that he may be going home today and there is a need to problem solve how he will get into the house due to the 7steps to enter  Prior to Session Communication: Bedside nurse  Patient Position Received: Bed, 2 rail up, Alarm on  General Comment: 79 year old WM admitted from home with c/'o bladder pain, urine retention, and gross hematuria   Precautions:  Hearing/Visual Limitations: Hearing WFL, no glasses  Medical Precautions: Fall precautions            Pain:  Pain Assessment  Pain Assessment: 0-10  0-10 (Numeric) Pain Score: 3  Pain Type: Chronic pain  Pain Location: Knee  Pain Orientation: Left    Objective   Cognition:  Overall Cognitive Status: Within Functional Limits  Orientation Level: Oriented X4           Home Living:  Type of Home: House  Lives With: Spouse  Home Adaptive Equipment: Cane  Home Layout: Multi-level  Home Access: Stairs to enter with rails  Entrance Stairs-Rails: Right  Entrance Stairs-Number of Steps: 3+4  Bathroom Shower/Tub: Walk-in shower  Bathroom Toilet: Standard  Bathroom Equipment: Grab bars in shower  Prior Function:  Level of [place]: Independent with ADLs and functional transfers, Independent with homemaking with ambulation  Receives Help From: Family  ADL Assistance: Independent  Homemaking Assistance: Independent  Ambulatory Assistance: Independent  Vocational: Retired  Hand Dominance: Right  IADL History:     ADL:  Eating Assistance: Independent  Grooming Assistance: Minimal  Bathing Assistance: Moderate  UE Dressing Assistance: Minimal  LE Dressing Assistance: Moderate  Toileting Assistance with Device: Minimal  Functional Assistance: Minimal    Activity Tolerance:  Endurance: Endurance does not limit participation in activity       Bed Mobility/Transfers: Bed Mobility  Bed Mobility: Yes  Bed Mobility 1  Bed Mobility 1: Supine to sitting  Level of Assistance 1: Modified independent  Bed Mobility Comments 1: HOB elevated    Transfers  Transfer: Yes  Transfer  1  Transfer From 1: Bed to  Transfer to 1: Stand  Technique 1: Sit to stand  Transfer Device 1: Walker  Transfer Level of Assistance 1: Minimum assistance  Transfers 2  Transfer From 2: Stand to  Transfer to 2: Toilet  Technique 2: Stand to sit  Transfer Device 2: Walker  Transfer Level of Assistance 2: Minimum assistance  Transfers 3  Transfer From 3: Stand to  Transfer to 3: Chair with arms  Technique 3: Stand to sit  Transfer Device 3: Walker  Transfer Level of Assistance 3: Minimum assistance      Functional Mobility:  Functional Mobility  Functional Mobility Performed: Yes  Functional Mobility 1  Surface 1: Level tile  Device 1: Rolling walker  Assistance 1: Minimum assistance  Comments 1: performed functional mobility to and from the bathroom with minimal assist and 2ww  Sitting Balance:  Static Sitting Balance  Static Sitting-Balance Support: Feet supported  Static Sitting-Level of Assistance: Independent  Standing Balance:  Static Standing Balance  Static Standing-Balance Support: No upper extremity supported  Static Standing-Level of Assistance: Moderate assistance       Sensation:  Light Touch: No apparent deficits  Strength:  Strength Comments: BUe 3+/5  Hand Function:  Hand Function  Gross Grasp: Functional  Coordination: Impaired      Outcome Measures: [facility] Daily Activity  Putting on and taking off regular lower body clothing: A lot  Bathing (including washing, rinsing, drying): A lot  Putting on and taking off regular upper body clothing: A little  Toileting, which includes using toilet, bedpan or urinal: A little  Taking care of personal grooming such as brushing teeth: A little  Eating Meals: None  Daily Activity - Total Score: 17        Education Documentation  Precautions, taught by Ulises Ch OT at 2/11/2025  9:31 AM.  Learner: Patient  Readiness: Acceptance  Method: Explanation, Demonstration  Response: Verbalizes Understanding  Comment: call light, falls  precautions      Goals:         Signed            Problem: Bathing  Goal: STG - Patient will bathe lower body with set up and close supervision  Outcome: Progressing     Problem: Dressings Lower Extremities  Goal: STG - Patient to complete lower body dressing with set up and close supervision  Outcome: Progressing     Problem: Functional Mobility  Goal: perform functional mobility a household/community distance with 2ww independently  Outcome: Progressing     Problem: OT Transfers  Goal: LTG - Patient will perform all functional transfers with 2ww independently  Outcome: Progressing

## 2025-02-11 NOTE — PROGRESS NOTES
Physical Therapy    Physical Therapy Treatment    Patient Name: Tom Haas  MRN: [id number]  Department: [address]  Room: [address] [place] [address]  Today's Date: 2/11/2025  Time Calculation  Start Time: 1230  Stop Time: 1305  Time Calculation (min): 35 min         Assessment/Plan   PT Assessment  Rehab Prognosis: Good  Barriers to Discharge Home: Physical needs  Physical Needs: Stair navigation into home limited by function/safety (pt to be transported by ambulance due to mod difficultu navigating steps)  Evaluation/Treatment Tolerance: Patient tolerated treatment well  Medical Staff Made Aware: Yes  Strengths: Ability to acquire knowledge  Barriers to Participation: Comorbidities  End of Session Communication: Bedside nurse  Assessment Comment: pt demonstrates safe amb with FWW; NOT safe using cane; To have EMS/ambulance take him home, up the stairs and stay on main floor  End of Session Patient Position: Up in chair, Alarm on (spouse present; call button in reach.)     PT Plan  Treatment/Interventions: Bed mobility, Transfer training, Gait training, Stair training, Balance training, Neuromuscular re-education, Strengthening, Endurance training, Therapeutic exercise, Therapeutic activity  PT Plan: Ongoing PT  PT Frequency: 3 times per week  PT Discharge Recommendations: Low intensity level of continued care  Equipment Recommended upon Discharge: Wheeled walker  PT Recommended Transfer Status: Contact guard, Assistive device  PT - OK to Discharge: Yes      General Visit Information:   PT  Visit  PT Received On: 02/11/25  General  Family/Caregiver Present: Yes  Caregiver Feedback: spouse present  Prior to Session Communication: Bedside nurse  Patient Position Received: Up in chair, Alarm on (spouse present)  Preferred Learning Style: verbal, visual  General Comment: cleared by nurse for therapy; pt agreeable to therapy    Subjective   Precautions:  Precautions  Hearing/Visual Limitations: Hearing WFL, no glasses  Medical  Precautions: Fall precautions  Braces Applied: pt with left flexible knee sleeve donned     Date/Time Vitals Session Patient Position Pulse Resp SpO2 BP MAP (mmHg)    02/11/25 1230 During PT  --  --  --  --  --  --           Vital Signs Comment: O2 sat 96% with HR 58 bpm in sitting; o2 sat 91% with HR 71 bpm after amb trial; Recovered to 94% O2 sat with seated pursed lip breathing < 1 min     Objective   Pain:  Pain Assessment  Pain Assessment: 0-10  0-10 (Numeric) Pain Score: 0 - No pain (at rest)  Cognition:  Cognition  Overall Cognitive Status: Within Functional Limits  Orientation Level: Oriented X4  Safety/Judgement:  (decreased safety insight during functional mobility)  Coordination:  Movements are Fluid and Coordinated: No  Lower Body Coordination: decreased timing/accuracy of brenda LE active movements  Postural Control:  Postural Control  Posture Comment: mild forward head, protracted shldrs  Extremity/Trunk Assessments:    Activity Tolerance:  Activity Tolerance  Endurance: Decreased tolerance for upright activites  Activity Tolerance Comments: fair+ with rollator  Treatments:  Therapeutic Exercise  Therapeutic Exercise Performed: Yes  Therapeutic Exercise Activity 1: seated brenda AP x 30 reps  Therapeutic Exercise Activity 2: seated brenda LAQ's x 25 reps  Therapeutic Exercise Activity 3: seated brenda marching x 20 reps  Therapeutic Exercise Activity 4: seated brenda hip abd/add x 15 reps    Therapeutic Activity  Therapeutic Activity Performed: Yes (see transfers, amb with cane/FWW and stairs)    Bed Mobility  Bed Mobility: No    Ambulation/Gait Training  Ambulation/Gait Training Performed: Yes  Ambulation/Gait Training 1  Surface 1: Level tile  Device 1: Rolling walker  Gait Support Devices: Other (Comment) (left knee sleeve)  Assistance 1: Close supervision  Quality of Gait 1: Diminished heel strike, Decreased step length  Comments/Distance (ft) 1: 36 ft x 2, + turns, good balance  Ambulation/Gait Training  2  Surface 2: Level tile  Device 2: Single point cane  Gait Support Devices:  (left knee sleeve)  Assistance 2: Moderate assistance, Minimal verbal cues  Quality of Gait 2: Narrow base of support, Diminished heel strike, Inconsistent stride length, Shuffling gait, Forward flexed posture, Antalgic  Comments/Distance (ft) 2: pt amb 8 to 10 steps, verbal cues for erect posture, keeping cane  in proper place for appropriate B of S; poor balance  Transfers  Transfer: Yes  Transfer 1  Transfer From 1: Chair with arms to  Transfer to 1: Stand  Technique 1: Sit to stand  Transfer Device 1: Walker  Transfer Level of Assistance 1: Close supervision, Minimal verbal cues  Trials/Comments 1: verbal cues for proper brenda hand placement on arms of chair  Transfers 2  Transfer to 2: Chair with arms  Technique 2: Stand to sit  Transfer Device 2: Walker  Transfer Level of Assistance 2: Close supervision, Minimal verbal cues  Trials/Comments 2: verbal cues for reaching back with both hands for arms of chair    Stairs  Rails 1: None (Comment)  Curb Step 1: Yes  Device 1: Single point cane  Support Devices 1: Other (Comment) (left knee sleeve)  Assistance 1: Moderate assistance, Minimal verbal cues  Comment/Number of Steps 1: mod assist of 1 for ascending/descending 1 step, verbal cues for safe sequencing    Other Activity  Other Activity Performed:  (PT discussed at length with pt and spouse different ways/options to enter home due to approx 7 steps; Ultimately decided safest way is to be transported home by ambulance and have EMS take pt into home via stretcher. Spouse and pt  agreeable)    Outcome Measures:  [facility] Basic Mobility  Turning from your back to your side while in a flat bed without using bedrails: None  Moving from lying on your back to sitting on the side of a flat bed without using bedrails: A little  Moving to and from bed to chair (including a wheelchair): A little  Standing up from a chair using your arms (e.g.  wheelchair or bedside chair): A little  To walk in hospital room: A little  Climbing 3-5 steps with railing: A lot  Basic Mobility - Total Score: 18    Education Documentation  Precautions, taught by Jessica Santillan PT at 2/11/2025  1:23 PM.  Learner: Patient  Readiness: Acceptance  Method: Explanation  Response: Verbalizes Understanding, Needs Reinforcement  Comment: PT educated pt in safe transfer technique and use of FWW    Mobility Training, taught by Jessica Santillan PT at 2/11/2025  1:23 PM.  Learner: Patient  Readiness: Acceptance  Method: Explanation  Response: Verbalizes Understanding, Needs Reinforcement  Comment: PT educated pt in safe transfer technique and use of FWW    Education Comments  No comments found.        OP EDUCATION:       Encounter Problems       Encounter Problems (Active)       PT STG Problem       Patient will transfer supine to sit and sit to supine with independent assist to facilitate mobility. (Progressing)       Start:  02/10/25    Expected End:  03/10/25            Patient will transfer sit to stand and stand to sit with modified independent assist with use of straight cane to facilitate mobility. (Progressing)       Start:  02/10/25    Expected End:  03/10/25            Patient will amb 50 feet straight cane device including two turns on even surface with independent assist to facilitate safe mobility.   (Progressing)       Start:  02/10/25    Expected End:  03/10/25            Patient will negotiate 12 stairs with one rail(s) and independent} assist with straight cane device for in home and community. (Progressing)       Start:  02/10/25    Expected End:  03/10/25

## 2025-02-11 NOTE — PROGRESS NOTES
"Tom Haas is a 79 y.o. male on day 1 of admission presenting with Gross hematuria.    Subjective   No complaints of pain.  Urine has cleared.  Just mildly bloody.  No further clot retention. Hematocrit stable at 33.1       Objective     Physical Exam  Awake and alert  Normal respiratory pattern  :  Rodriguze in place.  Urine much clearer.  Last Recorded Vitals  Blood pressure 118/77, pulse 89, temperature 37.3 °C (99.1 °F), temperature source Oral, resp. rate 16, height 1.651 m (5' 5\"), weight 76.2 kg (168 lb), SpO2 98%.  Intake/Output last 3 Shifts:  I/O last 3 completed shifts:  In: 300 (3.9 mL/kg) [P.O.:300]  Out: 450 (5.9 mL/kg) [Urine:450 (0.2 mL/kg/hr)]  Weight: 76.2 kg     Relevant Results               No results found for this or any previous visit (from the past 24 hours).                Assessment/Plan   Assessment & Plan  Gross hematuria  Blood has cleared.  Bleeding likely a combination of his tumor, irritation from the cystoscopy and the use of aspirin.  The aspirin has been stopped and the urine has cleared.  Hematocrit is stable.  Will remove the catheter tomorrow for a voiding trial.  If able to void he can be discharged home.    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  He last took aspirin 2 days ago.  Will plan a TURBT next week.  UTI (urinary tract infection)  No UTI.. Culture was negative  Ambulates with cydney Castanon MD      "

## 2025-02-11 NOTE — PROGRESS NOTES
02/11/25 1156   Discharge Planning   Expected Discharge Disposition Home     Patient to return home with no skilled services pt drives and is not homebound     NO BARRIERS TO DISCHARGE FROM CARE TRANSITIONS

## 2025-02-11 NOTE — CARE PLAN
Problem: Bathing  Goal: STG - Patient will bathe lower body with set up and close supervision  Outcome: Progressing     Problem: Dressings Lower Extremities  Goal: STG - Patient to complete lower body dressing with set up and close supervision  Outcome: Progressing     Problem: Functional Mobility  Goal: perform functional mobility a household/community distance with 2ww independently  Outcome: Progressing     Problem: OT Transfers  Goal: LTG - Patient will perform all functional transfers with 2ww independently  Outcome: Progressing

## 2025-02-12 ENCOUNTER — HOSPITAL ENCOUNTER (OUTPATIENT)
Facility: HOSPITAL | Age: 80
Setting detail: OUTPATIENT SURGERY
End: 2025-02-12
Attending: UROLOGY | Admitting: UROLOGY
Payer: MEDICARE

## 2025-02-12 ENCOUNTER — PATIENT OUTREACH (OUTPATIENT)
Dept: PRIMARY CARE | Facility: CLINIC | Age: 80
End: 2025-02-12
Payer: MEDICARE

## 2025-02-12 LAB
ATRIAL RATE: 82 BPM
P AXIS: 76 DEGREES
P OFFSET: 142 MS
P ONSET: 78 MS
PR INTERVAL: 246 MS
Q ONSET: 201 MS
QRS COUNT: 14 BEATS
QRS DURATION: 120 MS
QT INTERVAL: 410 MS
QTC CALCULATION(BAZETT): 479 MS
QTC FREDERICIA: 455 MS
R AXIS: -59 DEGREES
T AXIS: 117 DEGREES
T OFFSET: 406 MS
VENTRICULAR RATE: 82 BPM

## 2025-02-12 NOTE — PROGRESS NOTES
Discharge Facility:[facility]  Discharge Diagnosis:Hematuria  Admission Date:2/10/25  Discharge Date: 2/11/25    PCP Appointment Date:none made sent to pcp office patients wife will call to schedule in future.  Specialist Appointment Date:   Hospital Encounter and Summary Linked: YesED to Hosp-Admission (Discharged) with Craig Elder DO; Tereza Mon DO; Genet Vazquez MD (02/09/2025)   See discharge assessment below for further details  Wrap Up  Wrap Up Additional Comments: discused discharge spoke to patients wife she stated that he is improving he will be having a procedure on the 20th . She stated that she will reach out to schedule with DR Hinkle after his procedure.provided contact information encouraged call with questions. (2/12/2025  9:25 AM)  Call End Time: 0930 (2/12/2025  9:25 AM)    Engagement  Call Start Time: 0925 (2/12/2025  9:25 AM)    Medications  Medications reviewed with patient/caregiver?: Yes (stop asa 81 mg spoke to his wife) (2/12/2025  9:25 AM)  Is the patient having any side effects they believe may be caused by any medication additions or changes?: No (2/12/2025  9:25 AM)  Does the patient have all medications ordered at discharge?: Not applicable (2/12/2025  9:25 AM)  Is the patient taking all medications as directed (includes completed medication regime)?: Yes (2/12/2025  9:25 AM)    Appointments  Does the patient have a primary care provider?: Yes (2/12/2025  9:25 AM)  Care Management Interventions: Advised patient to make appointment (2/12/2025  9:25 AM)  Has the patient kept scheduled appointments due by today?: Yes (2/12/2025  9:25 AM)    Self Management  Has home health visited the patient within 72 hours of discharge?: Not applicable (2/12/2025  9:25 AM)  Has all Durable Medical Equipment (DME) been delivered?: No (2/12/2025  9:25 AM)    Patient Teaching  Does the patient have access to their discharge instructions?: Yes (spoke to his wife) (2/12/2025  9:25  AM)  Care Management Interventions: Reviewed instructions with patient (spoke to his wife) (2/12/2025  9:25 AM)  What is the patient's perception of their health status since discharge?: Improving (2/12/2025  9:25 AM)  Is the patient/caregiver able to teach back the hierarchy of who to call/visit for symptoms/problems? PCP, Specialist, Home Health nurse, Urgent Care, ED, 911: Yes (2/12/2025  9:25 AM)

## 2025-02-17 ENCOUNTER — LAB (OUTPATIENT)
Dept: LAB | Facility: HOSPITAL | Age: 80
End: 2025-02-17
Payer: MEDICARE

## 2025-02-17 ENCOUNTER — PRE-ADMISSION TESTING (OUTPATIENT)
Dept: PREADMISSION TESTING | Facility: HOSPITAL | Age: 80
End: 2025-02-17
Payer: MEDICARE

## 2025-02-17 VITALS
HEIGHT: 65 IN | WEIGHT: 171.52 LBS | OXYGEN SATURATION: 96 % | HEART RATE: 77 BPM | RESPIRATION RATE: 16 BRPM | TEMPERATURE: 97.7 F | DIASTOLIC BLOOD PRESSURE: 74 MMHG | SYSTOLIC BLOOD PRESSURE: 129 MMHG | BODY MASS INDEX: 28.58 KG/M2

## 2025-02-17 DIAGNOSIS — Z01.818 PRE-OP EVALUATION: Primary | ICD-10-CM

## 2025-02-17 DIAGNOSIS — Z01.818 ENCOUNTER FOR OTHER PREPROCEDURAL EXAMINATION: Primary | ICD-10-CM

## 2025-02-17 LAB
ANION GAP SERPL CALC-SCNC: 18 MMOL/L (ref 10–20)
BUN SERPL-MCNC: 87 MG/DL (ref 6–23)
CALCIUM SERPL-MCNC: 9.1 MG/DL (ref 8.6–10.3)
CHLORIDE SERPL-SCNC: 96 MMOL/L (ref 98–107)
CO2 SERPL-SCNC: 22 MMOL/L (ref 21–32)
CREAT SERPL-MCNC: 3.61 MG/DL (ref 0.5–1.3)
EGFRCR SERPLBLD CKD-EPI 2021: 16 ML/MIN/1.73M*2
GLUCOSE SERPL-MCNC: 94 MG/DL (ref 74–99)
POTASSIUM SERPL-SCNC: 4.4 MMOL/L (ref 3.5–5.3)
SODIUM SERPL-SCNC: 132 MMOL/L (ref 136–145)

## 2025-02-17 PROCEDURE — 80048 BASIC METABOLIC PNL TOTAL CA: CPT

## 2025-02-17 PROCEDURE — 99204 OFFICE O/P NEW MOD 45 MIN: CPT | Performed by: NURSE PRACTITIONER

## 2025-02-17 RX ORDER — ASPIRIN 81 MG/1
81 TABLET ORAL DAILY
COMMUNITY

## 2025-02-17 ASSESSMENT — DUKE ACTIVITY SCORE INDEX (DASI)
DASI METS SCORE: 4.3
CAN YOU DO HEAVY WORK AROUND THE HOUSE LIKE SCRUBBING FLOORS OR LIFTING AND MOVING HEAVY FURNITURE: NO
TOTAL_SCORE: 12.7
CAN YOU DO LIGHT WORK AROUND THE HOUSE LIKE DUSTING OR WASHING DISHES: YES
CAN YOU PARTICIPATE IN MODERATE RECREATIONAL ACTIVITIES LIKE GOLF, BOWLING, DANCING, DOUBLES TENNIS OR THROWING A BASEBALL OR FOOTBALL: NO
CAN YOU HAVE SEXUAL RELATIONS: NO
CAN YOU CLIMB A FLIGHT OF STAIRS OR WALK UP A HILL: YES
CAN YOU WALK INDOORS, SUCH AS AROUND YOUR HOUSE: YES
CAN YOU DO YARD WORK LIKE RAKING LEAVES, WEEDING OR PUSHING A MOWER: NO
CAN YOU TAKE CARE OF YOURSELF (EAT, DRESS, BATHE, OR USE TOILET): YES
CAN YOU RUN A SHORT DISTANCE: NO
CAN YOU DO MODERATE WORK AROUND THE HOUSE LIKE VACUUMING, SWEEPING FLOORS OR CARRYING GROCERIES: NO
CAN YOU PARTICIPATE IN STRENOUS SPORTS LIKE SWIMMING, SINGLES TENNIS, FOOTBALL, BASKETBALL, OR SKIING: NO
CAN YOU WALK A BLOCK OR TWO ON LEVEL GROUND: NO

## 2025-02-17 ASSESSMENT — ENCOUNTER SYMPTOMS
NECK NEGATIVE: 1
NEUROLOGICAL NEGATIVE: 1
BRUISES/BLEEDS EASILY: 1
RESPIRATORY NEGATIVE: 1
EYES NEGATIVE: 1
ENDOCRINE NEGATIVE: 1
GASTROINTESTINAL NEGATIVE: 1

## 2025-02-17 ASSESSMENT — LIFESTYLE VARIABLES: SMOKING_STATUS: NONSMOKER

## 2025-02-17 ASSESSMENT — PAIN - FUNCTIONAL ASSESSMENT: PAIN_FUNCTIONAL_ASSESSMENT: 0-10

## 2025-02-17 NOTE — PREPROCEDURE INSTRUCTIONS
Medication List            Accurate as of February 17, 2025 10:04 AM. Always use your most recent med list.                acetaminophen 500 mg tablet  Commonly known as: Tylenol  Additional Medication Adjustments for Surgery: Other (Comment)  Notes to patient: No need to stop before surgery, ok on Day of surgery with sip of water if needed      aspirin 81 mg EC tablet  Additional Medication Adjustments for Surgery: Take last dose 5 days before surgery     atorvastatin 20 mg tablet  Commonly known as: Lipitor  Take 1 tablet (20 mg) by mouth once daily at bedtime.  Medication Adjustments for Surgery: Take/Use as prescribed     carvedilol 6.25 mg tablet  Commonly known as: Coreg  Take 1 tablet (6.25 mg) by mouth 2 times a day.  Medication Adjustments for Surgery: Take/Use as prescribed     cholecalciferol 50 MCG (2000 UT) tablet  Commonly known as: Vitamin D-3  Take 1 tablet (2,000 Units) by mouth once daily. Do not start before March 14, 2024.  Additional Medication Adjustments for Surgery: Take last dose 7 days before surgery     ferrous sulfate 324 mg (65 mg elemental iron) EC tablet (delayed release)  Take 1 tablet (65 mg) by mouth once daily with breakfast.  Additional Medication Adjustments for Surgery: Take last dose 7 days before surgery     furosemide 80 mg tablet  Commonly known as: Lasix  Take 1 tablet (80 mg) by mouth once daily.  Medication Adjustments for Surgery: Do Not take on the morning of surgery     hydrALAZINE 50 mg tablet  Commonly known as: Apresoline  Take 1 tablet (50 mg) by mouth 2 times a day.  Medication Adjustments for Surgery: Take/Use as prescribed     isosorbide mononitrate ER 30 mg 24 hr tablet  Commonly known as: Imdur  Take 1 tablet (30 mg) by mouth once daily. Do not crush or chew.  Medication Adjustments for Surgery: Take/Use as prescribed     omega 3-dha-epa-fish oil 1,000 (120-180) mg capsule  Additional Medication Adjustments for Surgery: Take last dose 7 days before  surgery     sodium bicarbonate 325 mg tablet  Medication Adjustments for Surgery: Take/Use as prescribed            If no issues with your liver you may take Tylenol 2-500 mg tablets every 8 hrs around the clock for pain including morning of surgery with a sip of water    STOP VITAMINS, SUPPLEMENTS, HERBS, PROBIOTICS, AND FISH OIL, NO MOTRIN OR ADVIL OR ALEVE 7 DAYS BEFORE SURGERY    IF YOU HAVE A CPAP MACHINE BRING ON DAY OF SURGERY    NPO Instructions:   Do not eat any food after midnight the night before your surgery/procedure.  You may have clear liquids until TWO hours before surgery/procedure. This includes water, black tea/coffee, (no milk or cream) apple juice and electrolyte drinks (Gatorade).  You may chew gum up to TWO hours before your surgery/procedure.     Additional Instructions:  if not a joint replacement, body wash and mouth wash do not pertain to you     Seven/Six Days before Surgery:  Review your medication instructions, stop indicated medications  Five Days before Surgery:  Review your medication instructions, stop indicated medications  Three Days before Surgery:  Review your medication instructions, stop indicated medications  The Day before Surgery:  No smoking or alcohol use 24 hours before surgery  Review your medication instructions, stop indicated medications  You will be contacted regarding the time of your arrival to facility and surgery time  Do not eat any food after Midnight  Day of Surgery:  Review your medication instructions, take indicated medications  If you have diabetes, please check your fasting blood sugar upon awakening.  If fasting blood sugar is <80 mg/dl, drink 100 ml of apple juice, time limit of 2 hours before  You may have clear liquids until TWO hours before surgery/procedure.  This includes water, black tea/coffee, (no milk or cream) apple juice and electrolyte drinks (Gatorade)  You may chew gum up to TWO hours before your surgery/procedure  Wear  comfortable loose  fitting clothing  Do not use moisturizers, creams, lotions or perfume  All jewelry and valuables should be left at home     CONTACT SURGEON'S OFFICE IF YOU DEVELOP:  * Fever = 100.4 F   * New respiratory symptoms (e.g. cough, shortness of breath, respiratory distress, sore throat)  * Recent loss of taste or smell  *Flu like symptoms such as headache, fatigue or gastrointestinal symptoms  * You develop any open sores, shingles, burning or painful urination   AND/OR:  * You no longer wish to have the surgery.  * Any other personal circumstances change that may lead to the need to cancel or defer this surgery.  *You were admitted to any hospital within one week of your planned procedure.     SMOKING:  *Quitting smoking can make a huge difference to your health and recovery from surgery.    *If you need help with quitting, call 6-683-QUIT-NOW.     THE DAY BEFORE SURGERY:  *Do not eat any food after midnight the night before your surgery.   *You may have up to TEN OUNCES of clear liquids until TWO hours before your instructed ARRIVAL TIME to hospital. This includes water, black tea/coffee, (no milk or cream) apple juice, clear broth and electrolyte drinks (Gatorade). Please avoid clear liquids that are red in color.   *You may chew gum/mints up to TWO hours before your surgery/procedure.     SURGICAL TIME:  *You will be contacted between 2 p.m. and 3 p.m. the business day before your surgery with your arrival time.  *If you haven't received a call by 3pm, call [phone]  *Scheduled surgery times may change and you will be notified if this occurs-check your personal voicemail for any updates.     ON THE MORNING OF SURGERY:  *Wear comfortable, loose fitting clothing.   *Do not use moisturizers, creams, lotions or perfume.  *All jewelry and valuables should be left at home.  *Prosthetic devices such as contact lenses, hearing aids, dentures, eyelash extensions, hairpins and body piercing must be removed before  surgery.     BRING WITH YOU:  *Photo ID and insurance card  *Current list of medications and allergies  *Pacemaker/Defibrillator/Heart stent cards  *CPAP machine and mask  *Slings/splints/crutches  *Copy of your complete Advanced Directive/DHPOA-if applicable  *Neurostimulator implant remote     PARKING AND ARRIVAL:  *Check in at the Main Entrance desk and let them know you are here for surgery.     IF YOU ARE HAVING OUTPATIENT/SAME DAY SURGERY:  *A responsible adult MUST accompany you at the time of discharge and stay with you for 24 hours after your surgery.  *You may NOT drive yourself home after surgery.  *You may use a taxi or ride sharing service ([employer], [employer]) to return home ONLY if you are accompanied by a friend or family member.  *Instructions for resuming your medications will be provided by your surgeon.     Thank you for coming to Pre Admission testing.      If I have prescribe medication please don't forget to  at your pharmacy.      Any questions about today's visit call [phone] and leave a message in the general mailbox.     Patient instructed to ambulate as soon as possible postoperatively to decrease thromboembolic risk.     John Decker, APRN-CNP     Thank you for visiting [facility].  If you have any changes to your health condition, please call the surgeons office to alert them and give them details of your symptoms.        Preoperative Fasting Guidelines     Why must I stop eating and drinking near surgery time?  With sedation, food or liquid in your stomach can enter your lungs causing serious complications  Increases nausea and vomiting     When do I need to stop eating and drinking before my surgery?  Do not eat any food after midnight the night before your surgery/procedure.  You may have up to TEN ounces of clear liquid until TWO hours before your instructed arrival time to the hospital.  This includes water, black tea/coffee, (no milk or cream) apple  juice, and electrolyte drinks (Gatorade)  You may chew gum until TWO hours before your surgery/procedure        Additional Instructions:      The Day before Surgery:  -Review your medication instructions, stop indicated medications  -You will be contacted in the evening regarding the time of your arrival to facility and surgery time     Day of Surgery:  -Review your medication instructions, take indicated medications  -Wear comfortable loose fitting clothing  -Do not use moisturizers, creams, lotions or perfume  -All jewelry and valuables should be left at home              Preoperative Brain Exercises     What are brain exercises?  A brain exercise is any activity that engages your thinking (cognitive) skills.     What types of activities are considered brain exercises?  Jigsaw puzzles, crossword puzzles, word jumble, memory games, word search, and many more.  Many can be found free online or on your phone via a mobile fei.     Why should I do brain exercises before my surgery?  More recent research has shown brain exercise before surgery can lower the risk of postoperative delirium (confusion) which can be especially important for older adults.  Patients who did brain exercises for 5 to 10 minutes a day the days before surgery, cut their risk of postoperative delirium in half up to 1 week after surgery.                 [facility]     Preoperative Deep Breathing Exercises     Why it is important to do deep breathing exercises before my surgery?  Deep breathing exercises strengthen your breathing muscles.  This helps you to recover after your surgery and decreases the chance of breathing complications.      How are the deep breathing exercises done?  Sit straight with your back supported.  Breathe in deeply and slowly through your nose. Your lower rib cage should expand and your abdomen may move forward.  Hold that breath for 3 to 5 seconds.  Breathe out through pursed lips, slowly and  completely.  Rest and repeat 10 times every hour while awake.  Rest longer if you become dizzy or lightheaded.                      [facility]  Preoperative Deep Breathing Exercises     Why it is important to do deep breathing exercises before my surgery?  Deep breathing exercises strengthen your breathing muscles.  This helps you to recover after your surgery and decreases the chance of breathing complications.        How are the deep breathing exercises done?  Sit straight with your back supported.  Breathe in deeply and slowly through your nose. Your lower rib cage should expand and your abdomen may move forward.  Hold that breath for 3 to 5 seconds.  Breathe out through pursed lips, slowly and completely.  Rest and repeat 10 times every hour while awake.  Rest longer if you become dizzy or lightheaded.        Patient Information: Incentive Spirometer  What is an incentive spirometer?  An incentive spirometer is a device used before and after surgery to “exercise” your lungs.  It helps you to take deeper breaths to expand your lungs.  Below is an example of a basic incentive spirometer.  The device you receive may differ slightly but they all function the same.    Why do I need to use an incentive spirometer?  Using your incentive spirometer prepares your lungs for surgery and helps prevent lung problems after surgery.  How do I use my incentive spirometer?  When you're using your incentive spirometer, make sure to breathe through your mouth. If you breathe through your nose, the incentive spirometer won't work properly. You can hold your nose if you have trouble.  If you feel dizzy at any time, stop and rest. Try again at a later time.  Follow the steps below:  Set up your incentive spirometer, expand the flexible tubing and connect to the outlet.  Sit upright in a chair or bed. Hold the incentive spirometer at eye level.   Put the mouthpiece in your mouth and close your lips tightly  around it. Slowly breathe out (exhale) completely.  Breathe in (inhale) slowly through your mouth as deeply as you can. As you take a breath, you will see the piston rise inside the large column. While the piston rises, the indicator should move upwards. It should stay in between the 2 arrows (see Figure).  Try to get the piston as high as you can, while keeping the indicator between the arrows.   If the indicator doesn't stay between the arrows, you're breathing either too fast or too slow.  When you get it as high as you can, hold your breath for 10 seconds, or as long as possible. While you're holding your breath, the piston will slowly fall to the base of the spirometer.  Once the piston reaches the bottom of the spirometer, breathe out slowly through your mouth. Rest for a few seconds.  Repeat 10 times. Try to get the piston to the same level with each breath.  Repeat every hour while awake  You can carefully clean the outside of the mouthpiece with an alcohol wipe or soap and water.       Patient and Family Education        Ways You Can Help Prevent Blood Clots               This handout explains some simple things you can do to help prevent blood clots.      Blood clots are blockages that can form in the body's veins. When a blood clot forms in your deep veins, it may be called a deep vein thrombosis, or DVT for short. Blood clots can happen in any part of the body where blood flows, but they are most common in the arms and legs. If a piece of a blood clot breaks free and travels to the lungs, it is called a pulmonary embolus (PE). A PE can be a very serious problem.         Being in the hospital or having surgery can raise your chances of getting a blood clot because you may not be well enough to move around as much as you normally do.         Ways you can help prevent blood clots in the hospital           Wearing SCDs. SCDs stands for Sequential Compression Devices.   SCDs are special sleeves that wrap  around your legs  They attach to a pump that fills them with air to gently squeeze your legs every few minutes.   This helps return the blood in your legs to your heart.   SCDs should only be taken off when walking or bathing.   SCDs may not be comfortable, but they can help save your life.                                            Wearing compression stockings - if your doctor orders them. These special snug fitting stockings gently squeeze your legs to help blood flow.       Walking. Walking helps move the blood in your legs.   If your doctor says it is ok, try walking the halls at least   5 times a day. Ask us to help you get up, so you don't fall.      Taking any blood thinning medicines your doctor orders.        Page 1 of 2            [facility]; 3/23   Ways you can help prevent blood clots at home         Wearing compression stockings - if your doctor orders them. ? Walking - to help move the blood in your legs.       Taking any blood thinning medicines your doctor orders.      Signs of a blood clot or PE        Tell your doctor or nurse know right away if you have of the problems listed below.    If you are at home, seek medical care right away. Call 911 for chest pain or problems breathing.                Signs of a blood clot (DVT) - such as pain,  swelling, redness or warmth in your arm or leg      Signs of a pulmonary embolism (PE) - such as chest pain or feeling short of breath    JEAN CARLOS Pierre  Thank you for visiting [facility].  If you have any changes to your health condition, please call the surgeons office to alert them and give them details of your symptoms.

## 2025-02-17 NOTE — CPM/PAT H&P
CPM/PAT Evaluation   ICD in place      Name: Tom Haas (Tom Haas)  /Age: 1945/79 y.o.     Visit Type:   In-Person       Chief Complaint: bladder cancer    HPI 80 yo male who is referred to PAT by Dr Castanon for evaluation in advance of his cysto with bladder lesion ablation 25. 25 He presented to ED with Hematuria. He had a cystoscopy with Dr. Castanon on , developing hematuria the day after.  Patient presented to the emergency department and a three-way Rodriguez catheter was placed with irrigation.  Seen by urology, and the hematuria did clear over the next 48 hours or so.     See PMH below    Past Medical History:   Diagnosis Date    Anemia     Arrhythmia     Arthritis     Bladder cancer (Multi)     Cataract     CHF (congestive heart failure)     Chronic kidney disease     CKD (chronic kidney disease)     Coronary artery disease     Heart disease     Heart valve disease     Hyperlipidemia     Hypertension     Irregular heart beat     Joint pain     Myocardial infarction (Multi)     Occlusion and stenosis of unspecified carotid artery     Carotid atherosclerosis    Other conditions influencing health status     Coronary Artery Disease    Personal history of other diseases of the circulatory system     History of congestive heart disease    Personal history of other diseases of the circulatory system     History of hypertension    Personal history of other endocrine, nutritional and metabolic disease     History of hyperlipidemia    Thrombocytopenia (CMS-HCC)        Past Surgical History:   Procedure Laterality Date    CARDIAC CATHETERIZATION      CARDIAC ELECTROPHYSIOLOGY PROCEDURE Left 10/01/2024    Procedure: ICD Dual Implant;  Surgeon: Saul Power MD;  Location: [place] [address] [facility];  Service: Electrophysiology;  Laterality: Left;  DC ICD SJM    CATARACT EXTRACTION      CATARACT EXTRACTION, BILATERAL      INSERT / REPLACE / REMOVE PACEMAKER         Patient Sexual  activity questions deferred to the physician.    Family History   Problem Relation Name Age of Onset    Alcohol abuse Father           in his 50s related to complications of alcohol abuse    COPD Sister      Other (pacemaker) Sister         No Known Allergies    Medication Documentation Review Audit       Reviewed by Kourtney Welch RN (Registered Nurse) on 25 at 0945      Medication Order Taking? Sig Documenting Provider Last Dose Status   acetaminophen (Tylenol) 500 mg tablet [id number] Yes Take 2 tablets (1,000 mg) by mouth every 6 hours if needed for mild pain (1 - 3). Elizabeth Elena MD 2025 Active     Discontinued 25 1629   aspirin 81 mg EC tablet [id number] Yes Take 1 tablet (81 mg) by mouth once daily. Last dose25 Historical Provider, MD Past Month Active   atorvastatin (Lipitor) 20 mg tablet [id number] Yes Take 1 tablet (20 mg) by mouth once daily at bedtime. Kelvin Murphy MD 2025 Active   carvedilol (Coreg) 6.25 mg tablet [id number] Yes Take 1 tablet (6.25 mg) by mouth 2 times a day. Kelvin Murphy MD 2025 Morning Active   cholecalciferol (Vitamin D-3) 50 MCG (2000 UT) tablet [id number] Yes Take 1 tablet (2,000 Units) by mouth once daily. Do not start before 2024. Jerson Triplett MD 2025 Morning Active   ferrous sulfate 324 mg (65 mg elemental iron) EC tablet (delayed release) [id number] Yes Take 1 tablet (65 mg) by mouth once daily with breakfast. Anish Bazan DO 2025 Morning Active   furosemide (Lasix) 80 mg tablet [id number] Yes Take 1 tablet (80 mg) by mouth once daily. Kelvin Murphy MD 2025 Morning Active   hydrALAZINE (Apresoline) 50 mg tablet [id number] Yes Take 1 tablet (50 mg) by mouth 2 times a day. Anish Bazan DO 2025 Morning Active   isosorbide mononitrate ER (Imdur) 30 mg 24 hr tablet [id number] Yes Take 1 tablet (30 mg) by mouth once daily. Do not crush or chew. Kelvin Murphy MD 2025 Morning Active   omega  3-dha-epa-fish oil 1,000 (120-180) mg capsule [id number] Yes Take 1 capsule (1,000 mg) by mouth once daily. Historical Provider, MD 2/17/2025 Morning Active   sodium bicarbonate 325 mg tablet [id number]  Take 1 tablet (325 mg) by mouth 2 times a day. Half a tab 2 x day Historical Provider, MD  Active                         PAT ROS:   Constitutional:    Frail, using a WC and cane  Neuro/Psych:   neg    Eyes:   neg    Ears:   neg    Nose:   neg    Mouth:   neg    Throat:   neg    Neck:   neg    Cardio:    peripheral edema  Respiratory:   neg    Endocrine:   neg    GI:   neg    :    See HPI  Musculoskeletal:   Hematologic:    bruises/bleeds easily  Skin:  neg        Physical Exam  Vitals and nursing note reviewed. Exam conducted with a chaperone present (wife present). Physical exam within normal limits.   Constitutional:       Appearance: He is normal weight.   HENT:      Nose: Nose normal.      Mouth/Throat:      Mouth: Mucous membranes are moist.      Pharynx: Oropharynx is clear.   Eyes:      Extraocular Movements: Extraocular movements intact.      Conjunctiva/sclera: Conjunctivae normal.      Pupils: Pupils are equal, round, and reactive to light.   Cardiovascular:      Rate and Rhythm: Normal rate and regular rhythm.      Pulses: Normal pulses.      Heart sounds: Normal heart sounds.   Pulmonary:      Effort: Pulmonary effort is normal.      Comments: Diminished post d/t poor insp effort  Abdominal:      General: Bowel sounds are normal.      Palpations: Abdomen is soft.   Musculoskeletal:         General: Deformity (R ankle) present.      Cervical back: Normal range of motion and neck supple.      Right lower leg: Edema present.      Left lower leg: Edema present.   Skin:     General: Skin is warm and dry.      Capillary Refill: Capillary refill takes less than 2 seconds.   Neurological:      General: No focal deficit present.      Mental Status: He is alert and oriented to person, place, and time.  "  Psychiatric:         Behavior: Behavior normal.          PAT AIRWAY:   Airway:     Mallampati::  IV    TM distance::  >3 FB    Neck ROM::  Full    Visit Vitals  /74   Pulse 77   Temp 36.5 °C (97.7 °F) (Temporal)   Resp 16   Ht 1.651 m (5' 5\")   Wt 77.8 kg (171 lb 8.3 oz)   SpO2 96%   BMI 28.54 kg/m²   Smoking Status Never   BSA 1.89 m²       DASI Risk Score      Flowsheet Row Pre-Admission Testing from 2/17/2025 in [facility]   Can you take care of yourself (eat, dress, bathe, or use toilet)?  2.75 filed at 02/17/2025 1013   Can you walk indoors, such as around your house? 1.75 filed at 02/17/2025 1013   Can you walk a block or two on level ground?  0 filed at 02/17/2025 1013   Can you climb a flight of stairs or walk up a hill? 5.5 filed at 02/17/2025 1013   Can you run a short distance? 0 filed at 02/17/2025 1013   Can you do light work around the house like dusting or washing dishes? 2.7 filed at 02/17/2025 1013   Can you do moderate work around the house like vacuuming, sweeping floors or carrying groceries? 0 filed at 02/17/2025 1013   Can you do heavy work around the house like scrubbing floors or lifting and moving heavy furniture?  0 filed at 02/17/2025 1013   Can you do yard work like raking leaves, weeding or pushing a mower? 0 filed at 02/17/2025 1013   Can you have sexual relations? 0 filed at 02/17/2025 1013   Can you participate in moderate recreational activities like golf, bowling, dancing, doubles tennis or throwing a baseball or football? 0 filed at 02/17/2025 1013   Can you participate in strenous sports like swimming, singles tennis, football, basketball, or skiing? 0 filed at 02/17/2025 1013   DASI SCORE 12.7 filed at 02/17/2025 1013   METS Score (Will be calculated only when all the questions are answered) 4.3 filed at 02/17/2025 1013          Capdeidre DVT Assessment      Flowsheet Row ED to Hosp-Admission (Discharged) from 2/9/2025 in [facility] " with Craig Elder DO, Tereza Mon DO and Genet Vazquez MD Admission (Discharged) from 10/1/2024 in [facility] with Saul Power MD   DVT Score (IF A SCORE IS NOT CALCULATING, MUST SELECT A BMI TO COMPLETE) 4 filed at 02/09/2025 1116 5 filed at 10/01/2024 1545   Medical Factors -- Congestive Heart Failure < 1 month filed at 10/01/2024 1545   BMI (BMI MUST BE CHOSEN) 30 or less filed at 02/09/2025 1116 30 or less filed at 10/01/2024 1545          Modified Frailty Index    No data to display       CHADS2 Stroke Risk  Current as of 3 minutes ago        N/A 3 to 100%: High Risk   2 to < 3%: Medium Risk   0 to < 2%: Low Risk     Last Change: N/A          This score determines the patient's risk of having a stroke if the patient has atrial fibrillation.        This score is not applicable to this patient. Components are not calculated.          Revised Cardiac Risk Index    No data to display       Apfel Simplified Score      Flowsheet Row Pre-Admission Testing from 2/17/2025 in [facility]   Smoking status 1 filed at 02/17/2025 1013   History of motion sickness or PONV  0 filed at 02/17/2025 1013   Use of postoperative opioids 0 filed at 02/17/2025 1013   Gender - Female 0=No filed at 02/17/2025 1013   Apfel Simplified Score Calculator 1 filed at 02/17/2025 1013          Risk Analysis Index Results This Encounter    No data found in the last 10 encounters.       Stop Bang Score      Flowsheet Row Pre-Admission Testing from 2/17/2025 in [facility]   Do you snore loudly? 1 filed at 02/17/2025 0945   Do you often feel tired or fatigued after your sleep? 0 filed at 02/17/2025 0945   Has anyone ever observed you stop breathing in your sleep? 0 filed at 02/17/2025 0945   Do you have or are you being treated for high blood pressure? 1 filed at 02/17/2025 0945   Recent BMI (Calculated) 28.5 filed at 02/17/2025 0945   Is BMI greater than 35  kg/m2? 0=No filed at 02/17/2025 0945   Age older than 50 years old? 1=Yes filed at 02/17/2025 0945   Is your neck circumference greater than 17 inches (Male) or 16 inches (Female)? 1 filed at 02/17/2025 0945   Gender - Male 1=Yes filed at 02/17/2025 0945   STOP-BANG Total Score 5 filed at 02/17/2025 0945          Prodigy: High Risk  Total Score: 31              Prodigy Age Score      Prodigy Gender Score     Prodigy CHF score          ARISCAT Score for Postoperative Pulmonary Complications    No data to display       Posadas Perioperative Risk for Myocardial Infarction or Cardiac Arrest (SHAHID)    No data to display       Assessment and Plan   78 yo male presents to [facility] for risk assessment and preoperative optimization in advance of bladder surgery     Today his BP is good, pulse ox is 96% on RA at rest, he is afebrile    Neuro:  No diagnosis or significant findings on chart review or clinical presentation and evaluation.   The patient is at an increased risk for post operative delirium secondary to age >/= 65.  Preoperative brain exercise  discussed with patient. The patient is at an increased risk for perioperative stroke secondary age and co morbidities    HEENT/Airway:  No diagnosis or significant findings on chart review or clinical presentation and evaluation.     Cardiovascular:  Denies chest pain, shortness of breathe, dizziness, palpations, or lightheadedness    I am evaluating Mr Haas in [facility] in advance of his cysto and ablation of bladder cancer on 2/20/25 He has a PMH of  CAD by [facility] '05 (Lcx 80-90%, LAD 50-60%, RCA 40%), carotid stenosis, hypertension, CHF EF is 20-25 %, he has an ICD with recent DC for VF, Aortic stenosis with FEMI 0.91. He has Stage 3 kidney disease, his 2/11/25 serum Na 129. His VS are stable. minimal LE edema. My question is is he appropriate for surgery here at [facility]?       Dr Murphy please advise and addend your 1/23/25 Note with his Cardiac Risk and recommendations. Thanks    Saw  Cardiologist Dr Murphy on 1/23/25 who noted:  Tom Haas is a 79 y.o. male here for followup. He has a history of CAD by [facility] '05 (Lcx 80-90%, LAD 50-60%, RCA 40%), carotid stenosis, hypertension, hyperlipidemia, CKD (Cr 1.37 7/2020), & hydronephrosis. Had limited medical followup through the years. He was hospitalized 3/2024 with acute systolic HF and worsened CKD (stage IV-V). Low flow / low gradient aortic stenosis was also noted. Troponin peaked at > 4,000 (non-MI troponin elevation / acute non-traumatic myocardial injury).     He reports doing well outside of a low energy level. Reports BP's at home in the 100 teens to 130's at the highest (checks daily). Denies cardiovascular symptoms. He had his ICD placed since his last visit and had an appropriate device discharge for VF (assessed by EP) with no further action recommended. He holly having any sensation of the discharge.1.  HFrEF  Cardiomyopathy NOS. Persistent despite medical therapy. He is status post ICD and suffered a device discharge as above. He has not had an ischemic evaluation with coronary angiography given his renal failure, and lack of ischemic symptoms. No plans for SGLT2-I/Aldactone/ACE/ARB/ARNI due to his significant renal dysfunction. Offered caridac rehab for his reduced energy levels and he plans to think about joining.      2. Hypertension   BP uncontrolled today but reasonable with home checks. His present regimen is to continue.      3. Coronary artery disease  Some obstructive disease noted '05 in the Cx and non-obstructive disease elsewhere. On indefinite aspirin / statin. Potential future coronary angiography as above.     4. Aortic stenosis  Low flow / low gradient and persistent by most recent TTE. No plans to pursue valve replacement given angiography would be part of the workup and would likely lead to IHD which we'd like to avoid if at all possible at this stage. Monitoring.    Follow-up 6 months.    CAD/MI  Continue Imdur  and coreg, hydralazine as prescribed    H/O HFrEF- chronic systolic  He is euvolemic today, except for trace BLE edema  Continue Lasix as prescribed    Low EF ICD- check on 12/31/2024  Recent discharge of ICD for VF, patient states he did not feel it    METS are  4.3, RCRI is 3 (15% risk for 30 day postoperative MACE)  SHAHID indicates a 1.41% risk of intraoperative or 30 day postoperative SHAHID  No additional preoperative testing is currently indicated.    EKG - 1/23/25  Sinus rhythm with 1st degree AV block with occasional Premature ventricular complexes  Possible Left atrial enlargement  Left anterior fasicular block  Left ventricular hypertrophy with QRS widening and repolarization abnormality  Inferior infarct , age undetermined  Abnormal ECG  When compared with ECG of 26-JUN-2024 11:14,  Significant changes have occurred    ECHO 6/12/24  Left Ventricle: Left ventricular systolic function is severely decreased, with an estimated ejection fraction of 20-25%. There is global hypokinesis of the left ventricle with minor regional variations. The left ventricular cavity size is moderate to severely dilated. Left ventricular diastolic filling was indeterminate.  Left Atrium: The left atrium is mildly dilated.  Right Ventricle: The right ventricle is normal in size. There is normal right ventricular global systolic function.  Right Atrium: The right atrium is normal in size.  Aortic Valve: The aortic valve is trileaflet. There is trivial aortic valve regurgitation. The peak instantaneous gradient of the aortic valve is 23.1 mmHg. The mean gradient of the aortic valve is 12.4 mmHg.  Mitral Valve: The mitral valve is normal in structure. There is mild to moderate mitral valve regurgitation.  Tricuspid Valve: The tricuspid valve is structurally normal. There is moderate tricuspid regurgitation.  Pulmonic Valve: The pulmonic valve is not well visualized. There is mild pulmonic valve regurgitation.  Pericardium: There is no  "pericardial effusion noted.  Aorta: The aortic root is normal.        CONCLUSIONS:   1. Left ventricular systolic function is severely decreased with a 20-25% estimated ejection fraction.   2. Mild to moderate mitral valve regurgitation.   3. Moderate tricuspid regurgitation.   4. Left ventricular cavity size is moderate to severely dilated.   5. There is global hypokinesis of the left ventricle with minor regional variations.    Pulmonary:  Pulm nodule    PRODIGY: High risk for opioid induced respiratory depression  Discussed smoking cessation and deep breathing handout given    Renal:   CKD stage 3b  BUN/creat 2/11/25 (see below) EF 18    Hyponatremia- will recheck bmp today  Cont. Sodium Bicarb as prescribed    Pt at High risk for perioperative IVIS based on Dynamic Predictive Scoring Tool for Perioperative IVIS  Lab Results   Component Value Date    GLUCOSE 90 02/11/2025    CALCIUM 8.8 02/11/2025     (L) 02/11/2025    K 4.0 02/11/2025    CO2 19 (L) 02/11/2025    CL 98 02/11/2025    BUN 78 (H) 02/11/2025    CREATININE 3.40 (H) 02/11/2025       Endocrine:  No diagnosis or significant findings on chart review or clinical presentation and evaluation.     No results found for: \"HGBA1C\"    Hematologic:  Thrombocytopenia  Lab Results   Component Value Date    WBC 6.8 02/11/2025    HGB 9.8 (L) 02/11/2025    HCT 29.7 (L) 02/11/2025    MCV 90 02/11/2025    PLT 93 (L) 02/11/2025       CAPRINI SCORE 12    Pt supplied education/VTE handout    Gastrointestinal:   No diagnosis or significant findings on chart review or clinical presentation and evaluation.   EAT-10 score of 0 - self-perceived oropharyngeal dysphagia scale (0-40)      :  See HPI    Infectious disease:   No diagnosis or significant findings on chart review or clinical presentation and evaluation.     Musculoskeletal:   Uses a cane due to L knee pain    Instructed if  no issues with your liver you may take Tylenol 2-500 mg tablets every 8 hrs around the " clock for pain including morning of surgery with a sip of water    Preoperative risk assessment  ASA IV  NSQIP - Predicted length of stay days 0-3  PAT Testing - bmp    Anesthesia:  No anesthesia complications    denies dental issues  Smoker-denies  Drugs-denies  ETOH-rare  denies personal/family issues with Anesthesia    Face to Face patient contact time 45 min    JEAN CARLOS Pierre 2/17/2025 10:24 AM

## 2025-02-19 DIAGNOSIS — I50.43 CHF (CONGESTIVE HEART FAILURE), NYHA CLASS I, ACUTE ON CHRONIC, COMBINED: Primary | ICD-10-CM

## 2025-02-19 DIAGNOSIS — I50.20 HFREF (HEART FAILURE WITH REDUCED EJECTION FRACTION): ICD-10-CM

## 2025-02-24 ENCOUNTER — PATIENT OUTREACH (OUTPATIENT)
Dept: PRIMARY CARE | Facility: CLINIC | Age: 80
End: 2025-02-24
Payer: MEDICARE

## 2025-02-24 NOTE — PROGRESS NOTES
Follow up call after hospitalization. Patient did not see PCP within 14 days of discharge.  At time of outreach call the patient feels as if their condition has (improved some has good days and bad days waiting to have his procedure he's waiting for cardiac clearance.  Addressed any questions or concerns.

## 2025-02-24 NOTE — PROGRESS NOTES
"  Pharmacy Post-Discharge Visit    Tom Haas is a 79 y.o. male was referred to Clinical Pharmacy Team to complete a post-discharge medication optimization and monitoring visit.  The patient was referred for their Congestive Heart Failure and Medication Review.    Pt is here for First appointment.   Referring Provider: Mansoor Hinkle MD  PCP: Mansoor Hinkle MD, last visit: 10/30/24, next visit: 4/30/25    Admission Date: 2/9/25  Discharge Date: 2/11/25  Discharge Diagnosis: Hematuria    Subjective   HPI  PMH significant for Heart Failure (platinum referral).    Medication System Management  Patients preferred pharmacy:    [facility]/pharmacy #[id number] - [place], [place] - [address]  [address]  [facility] [id number]  Phone: [phone] Fax: [phone]  Adherence/Organization: No current concerns  Affordability/Accessibility: No current concerns  Adverse Effects: No current concerns  Discharge Medication Changes per AVS:      Drug Interactions  No relevant drug interactions were noted.    CONGESTIVE HEART FAILURE  Does patient follow with Cardiology: Yes - last visit 1/23/25    Staging  Ejection Fraction: 20-25%% (noted in 2/17/25)  NYHA Class: Class I - No limitations to physical activity    Symptom Assessment  Weight changes/edema?: Yes - was having feet swelling has gone down since increasing his furosemide    Medication Therapy  Current Regimen (GDMT):  ARNI/ACEi/ARB: No  Beta Blocker: Yes - carvedilol 6.25mg twice daily  MRA: No  SGLT2i: No  Cardiology note from 1/23/25: \"No plans for SGLT2-I/Aldactone/ACE/ARB/ARNI due to his significant renal dysfunction.\"    Other therapy:  Hydralazine 50mg twice daily  Isosorbide mononitrate 30mg once daily  Furosemide 80mg once daily    Preventative Care  Immunizations Needed: Annual Flu, RSV, Prevnar, Shingrix, and Tdap  Tobacco Use: non-smoker      Objective   No Known Allergies  Social History     Social History Narrative    Not on file    " "  Medication Review  Current Outpatient Medications   Medication Instructions    acetaminophen (TYLENOL) 1,000 mg, Every 6 hours PRN    aspirin 81 mg, Daily    atorvastatin (LIPITOR) 20 mg, oral, Nightly    carvedilol (COREG) 6.25 mg, oral, 2 times daily    cholecalciferol (VITAMIN D-3) 2,000 Units, oral, Daily    ferrous sulfate 65 mg, oral, Daily with breakfast    furosemide (LASIX) 80 mg, oral, Daily    hydrALAZINE (APRESOLINE) 50 mg, oral, 2 times daily    isosorbide mononitrate ER (IMDUR) 30 mg, oral, Daily, Do not crush or chew.    sodium bicarbonate 325 mg tablet oral, 2 times daily, Half a tab 2 x day      Vitals  BP Readings from Last 2 Encounters:   02/17/25 129/74   02/11/25 135/75     Labs  A1C  No results found for: \"HGBA1C\"  BMP  Lab Results   Component Value Date    CALCIUM 9.1 02/17/2025     (L) 02/17/2025    K 4.4 02/17/2025    CO2 22 02/17/2025    CL 96 (L) 02/17/2025    BUN 87 (H) 02/17/2025    CREATININE 3.61 (H) 02/17/2025    EGFR 16 (L) 02/17/2025     LFTs  Lab Results   Component Value Date    ALT 18 07/27/2024    AST 19 07/27/2024    ALKPHOS 37 07/27/2024    BILITOT 0.8 07/27/2024     FLP  Lab Results   Component Value Date    TRIG 108 03/11/2024    CHOL 173 03/11/2024    LDLCALC 113 (H) 03/11/2024    HDL 38.1 03/11/2024     Urine Microalbumin  No results found for: \"MICROALBCREA\"  Wt Readings from Last 3 Encounters:   02/17/25 77.8 kg (171 lb 8.3 oz)   02/09/25 76.2 kg (168 lb)   02/08/25 76.2 kg (168 lb)      BMI Readings from Last 1 Encounters:   02/17/25 28.54 kg/m²       Assessment/Plan   Problem List Items Addressed This Visit          Cardiac and Vasculature    CHF (congestive heart failure), NYHA class I, acute on chronic, combined    HFrEF (heart failure with reduced ejection fraction)     Medication review  Asked about OTC options for depression. Patient's daughter states he has been a little down since the hospitalization and asked if there were options. I told her I " typically don't recommend much OTC, but I would recommend a magnesium glycinate at night for more anxiety and to help with sleep    Patient has NYHA Class 1 HFrEF with most recent EF 20-25%. Patient is not on complete GDMT.  Rationale for plan: Patient managed by cardiology. Therefore, changes deferred to cardiology team. Patient is additionally on dialysis therefore, many of the GDMT agents are contraindicated or to be used with caution    Will continue plan as outlined by cardiology:  Current Regimen (GDMT):  ARNI/ACEi/ARB: No-could potentially utilize given dialysis  Beta Blocker: Yes - carvedilol 6.25mg twice daily  MRA: No- would not start given renal function  SGLT2i: No-contraindicated in dialysis  Other therapy:  Hydralazine 50mg twice daily  Isosorbide mononitrate 30mg once daily  Furosemide 80mg once daily    Patient Education:  Counseled patient on relevant medication mechanisms of action, expectations, side effects, duration of therapy, contraindications, administration, and monitoring parameters.  All questions and concerns addressed. Contact pharmacist with any further questions or concerns prior to next appointment.    Clinical Pharmacist follow-up: as needed    Thank you,  Pito HerreraD, BCACP  Clinical Pharmacy Specialist-Primary Care  [phone]    Continue all meds under the continuation of care with the referring provider and clinical pharmacy team.  Verbal consent to manage patient's drug therapy was obtained from the patient and an individual authorized to act on behalf of a patient. They were informed they may decline to participate or withdraw from participation in pharmacy services at any time.

## 2025-02-25 ENCOUNTER — APPOINTMENT (OUTPATIENT)
Dept: PHARMACY | Facility: HOSPITAL | Age: 80
End: 2025-02-25
Payer: MEDICARE

## 2025-02-25 DIAGNOSIS — I50.20 HFREF (HEART FAILURE WITH REDUCED EJECTION FRACTION): ICD-10-CM

## 2025-02-25 DIAGNOSIS — I50.43 CHF (CONGESTIVE HEART FAILURE), NYHA CLASS I, ACUTE ON CHRONIC, COMBINED: ICD-10-CM

## 2025-03-10 ENCOUNTER — OFFICE VISIT (OUTPATIENT)
Dept: PRIMARY CARE | Facility: CLINIC | Age: 80
End: 2025-03-10
Payer: MEDICARE

## 2025-03-10 ENCOUNTER — HOSPITAL ENCOUNTER (OUTPATIENT)
Dept: CARDIOLOGY | Facility: CLINIC | Age: 80
Discharge: HOME | End: 2025-03-10
Payer: MEDICARE

## 2025-03-10 ENCOUNTER — HOSPITAL ENCOUNTER (OUTPATIENT)
Dept: RADIOLOGY | Facility: CLINIC | Age: 80
Discharge: HOME | End: 2025-03-10
Payer: MEDICARE

## 2025-03-10 VITALS
TEMPERATURE: 97.3 F | SYSTOLIC BLOOD PRESSURE: 132 MMHG | OXYGEN SATURATION: 96 % | WEIGHT: 170 LBS | DIASTOLIC BLOOD PRESSURE: 84 MMHG | HEART RATE: 74 BPM | BODY MASS INDEX: 28.29 KG/M2

## 2025-03-10 DIAGNOSIS — I50.43 CHF (CONGESTIVE HEART FAILURE), NYHA CLASS I, ACUTE ON CHRONIC, COMBINED: ICD-10-CM

## 2025-03-10 DIAGNOSIS — I50.22 CHRONIC SYSTOLIC (CONGESTIVE) HEART FAILURE: ICD-10-CM

## 2025-03-10 DIAGNOSIS — R06.09 DYSPNEA ON EXERTION: ICD-10-CM

## 2025-03-10 DIAGNOSIS — R06.09 DYSPNEA ON EXERTION: Primary | ICD-10-CM

## 2025-03-10 PROCEDURE — 1111F DSCHRG MED/CURRENT MED MERGE: CPT | Performed by: INTERNAL MEDICINE

## 2025-03-10 PROCEDURE — 71046 X-RAY EXAM CHEST 2 VIEWS: CPT

## 2025-03-10 PROCEDURE — 71046 X-RAY EXAM CHEST 2 VIEWS: CPT | Performed by: RADIOLOGY

## 2025-03-10 PROCEDURE — 93295 DEV INTERROG REMOTE 1/2/MLT: CPT | Performed by: INTERNAL MEDICINE

## 2025-03-10 PROCEDURE — 99213 OFFICE O/P EST LOW 20 MIN: CPT | Performed by: INTERNAL MEDICINE

## 2025-03-10 PROCEDURE — 1160F RVW MEDS BY RX/DR IN RCRD: CPT | Performed by: INTERNAL MEDICINE

## 2025-03-10 PROCEDURE — 1126F AMNT PAIN NOTED NONE PRSNT: CPT | Performed by: INTERNAL MEDICINE

## 2025-03-10 PROCEDURE — 1159F MED LIST DOCD IN RCRD: CPT | Performed by: INTERNAL MEDICINE

## 2025-03-10 PROCEDURE — 3079F DIAST BP 80-89 MM HG: CPT | Performed by: INTERNAL MEDICINE

## 2025-03-10 PROCEDURE — 3075F SYST BP GE 130 - 139MM HG: CPT | Performed by: INTERNAL MEDICINE

## 2025-03-10 PROCEDURE — 93296 REM INTERROG EVL PM/IDS: CPT

## 2025-03-10 PROCEDURE — 1036F TOBACCO NON-USER: CPT | Performed by: INTERNAL MEDICINE

## 2025-03-10 RX ORDER — BUMETANIDE 0.5 MG/1
0.5 TABLET ORAL DAILY
Qty: 5 TABLET | Refills: 0 | Status: SHIPPED | OUTPATIENT
Start: 2025-03-10 | End: 2025-03-15

## 2025-03-10 ASSESSMENT — ENCOUNTER SYMPTOMS
PHOTOPHOBIA: 0
DIFFICULTY URINATING: 0
ABDOMINAL DISTENTION: 0
DIARRHEA: 0
ACTIVITY CHANGE: 0
CONSTIPATION: 0
DIZZINESS: 0
FEVER: 0
WEAKNESS: 0
EYE DISCHARGE: 0
BLOOD IN STOOL: 0
POLYPHAGIA: 0
SINUS PRESSURE: 0
CHOKING: 0
BACK PAIN: 0
VOICE CHANGE: 0
VOMITING: 0
COLOR CHANGE: 0
ABDOMINAL PAIN: 0
ARTHRALGIAS: 0
PALPITATIONS: 0
NAUSEA: 0
DIFFICULTY BREATHING: 1
SINUS PAIN: 0
CONFUSION: 0
HEADACHES: 0
SHORTNESS OF BREATH: 1
WHEEZING: 0
RHINORRHEA: 0
MYALGIAS: 0
NERVOUS/ANXIOUS: 0
NECK STIFFNESS: 0
EYE REDNESS: 0
BRUISES/BLEEDS EASILY: 0
CHEST TIGHTNESS: 0
HALLUCINATIONS: 0
STRIDOR: 0
FATIGUE: 1
COUGH: 0
APPETITE CHANGE: 0
POLYDIPSIA: 0
TROUBLE SWALLOWING: 0
DYSURIA: 0
FLANK PAIN: 0
NUMBNESS: 0
FREQUENCY: 0
SEIZURES: 0

## 2025-03-10 ASSESSMENT — PAIN SCALES - GENERAL: PAINLEVEL_OUTOF10: 0-NO PAIN

## 2025-03-10 NOTE — PROGRESS NOTES
Subjective   Patient ID: Cody Haas is a 79 y.o. male who presents for Breathing Problem (Shortness of breath for 2 weeks states when he is walking and laying down but feels better when he is sitting up right and nose congestion).    Breathing Problem  He complains of difficulty breathing and shortness of breath. There is no cough or wheezing. Pertinent negatives include no appetite change, chest pain, ear pain, fever, headaches, myalgias, rhinorrhea or trouble swallowing.      Patient presented to the office for worsening shortness of breath from past 2 weeks.  After rest he does not have shortness of breath but with ambulation he started getting short of breath.  Patient have stage V chronic kidney disease and is seeing a nephrologist.  He does have swelling of lower extremities.  At this time he is taking Lasix 80 mg oral tablet daily.    Review of Systems   Constitutional:  Positive for fatigue. Negative for activity change, appetite change and fever.   HENT:  Negative for congestion, dental problem, ear pain, hearing loss, rhinorrhea, sinus pressure, sinus pain, trouble swallowing and voice change.    Eyes:  Negative for photophobia, discharge, redness and visual disturbance.   Respiratory:  Positive for shortness of breath. Negative for cough, choking, chest tightness, wheezing and stridor.    Cardiovascular:  Negative for chest pain, palpitations and leg swelling.   Gastrointestinal:  Negative for abdominal distention, abdominal pain, blood in stool, constipation, diarrhea, nausea and vomiting.   Endocrine: Negative for polydipsia, polyphagia and polyuria.   Genitourinary:  Negative for difficulty urinating, dysuria, flank pain, frequency and urgency.   Musculoskeletal:  Negative for arthralgias, back pain, myalgias and neck stiffness.   Skin:  Negative for color change and rash.   Allergic/Immunologic: Negative for immunocompromised state.   Neurological:  Negative for dizziness, seizures, syncope, weakness,  numbness and headaches.   Hematological:  Does not bruise/bleed easily.   Psychiatric/Behavioral:  Negative for confusion and hallucinations. The patient is not nervous/anxious.      Objective   /84   Pulse 74   Temp 36.3 °C (97.3 °F)   Wt 77.1 kg (170 lb)   SpO2 96%   BMI 28.29 kg/m²     Physical Exam  Vitals and nursing note reviewed.   HENT:      Head: Normocephalic.      Nose: Nose normal. No congestion.   Eyes:      Conjunctiva/sclera: Conjunctivae normal.   Cardiovascular:      Rate and Rhythm: Normal rate and regular rhythm.      Pulses: Normal pulses.      Heart sounds: Normal heart sounds. No murmur heard.  Pulmonary:      Effort: Pulmonary effort is normal. No respiratory distress.      Breath sounds: Normal breath sounds. No stridor. No wheezing, rhonchi or rales.   Musculoskeletal:         General: Normal range of motion.      Right lower leg: Edema present.      Left lower leg: Edema present.   Skin:     General: Skin is warm.   Neurological:      General: No focal deficit present.      Mental Status: He is oriented to person, place, and time.   Psychiatric:         Mood and Affect: Mood normal.         Behavior: Behavior normal.       Assessment/Plan   Problem List Items Addressed This Visit       CHF (congestive heart failure), NYHA class I, acute on chronic, combined    Relevant Medications    bumetanide (Bumex) 0.5 mg tablet     Other Visit Diagnoses       Dyspnea on exertion    -  Primary    Relevant Orders    XR chest 2 views

## 2025-03-11 ENCOUNTER — PATIENT OUTREACH (OUTPATIENT)
Dept: PRIMARY CARE | Facility: CLINIC | Age: 80
End: 2025-03-11
Payer: MEDICARE

## 2025-03-12 ENCOUNTER — APPOINTMENT (OUTPATIENT)
Dept: PRIMARY CARE | Facility: CLINIC | Age: 80
End: 2025-03-12
Payer: MEDICARE

## 2025-03-17 ENCOUNTER — TELEPHONE (OUTPATIENT)
Dept: CARDIOLOGY | Facility: HOSPITAL | Age: 80
End: 2025-03-17
Payer: MEDICARE

## 2025-03-17 ENCOUNTER — TELEPHONE (OUTPATIENT)
Dept: PRIMARY CARE | Facility: CLINIC | Age: 80
End: 2025-03-17
Payer: MEDICARE

## 2025-03-17 DIAGNOSIS — I50.20 HFREF (HEART FAILURE WITH REDUCED EJECTION FRACTION): Primary | ICD-10-CM

## 2025-03-17 DIAGNOSIS — J90 PLEURAL EFFUSION: ICD-10-CM

## 2025-03-17 NOTE — TELEPHONE ENCOUNTER
Patient had a chest xray last Monday and is waiting for results so they know what their next step is.

## 2025-03-17 NOTE — TELEPHONE ENCOUNTER
Patient's daughter called saying pt was having some labored breathing issues so they saw PCP and he ordered a chest x ray. She is asking if you can take a look at the x ray and see if he is possibly having a similar situation he had in the past where he had to have fluid removed.

## 2025-03-19 ENCOUNTER — HOSPITAL ENCOUNTER (OUTPATIENT)
Dept: RADIOLOGY | Facility: HOSPITAL | Age: 80
Discharge: HOME | End: 2025-03-19
Payer: MEDICARE

## 2025-03-19 VITALS
HEART RATE: 96 BPM | SYSTOLIC BLOOD PRESSURE: 96 MMHG | DIASTOLIC BLOOD PRESSURE: 68 MMHG | RESPIRATION RATE: 18 BRPM | OXYGEN SATURATION: 98 %

## 2025-03-19 DIAGNOSIS — I50.20 HFREF (HEART FAILURE WITH REDUCED EJECTION FRACTION): ICD-10-CM

## 2025-03-19 DIAGNOSIS — J90 PLEURAL EFFUSION: ICD-10-CM

## 2025-03-19 PROCEDURE — 32555 ASPIRATE PLEURA W/ IMAGING: CPT

## 2025-03-19 ASSESSMENT — PAIN SCALES - GENERAL
PAINLEVEL_OUTOF10: 0 - NO PAIN
PAINLEVEL_OUTOF10: 0 - NO PAIN

## 2025-03-19 ASSESSMENT — PAIN - FUNCTIONAL ASSESSMENT: PAIN_FUNCTIONAL_ASSESSMENT: 0-10

## 2025-03-20 ENCOUNTER — OFFICE VISIT (OUTPATIENT)
Dept: CARDIOLOGY | Facility: HOSPITAL | Age: 80
End: 2025-03-20
Payer: MEDICARE

## 2025-03-20 VITALS
DIASTOLIC BLOOD PRESSURE: 78 MMHG | BODY MASS INDEX: 29.16 KG/M2 | SYSTOLIC BLOOD PRESSURE: 134 MMHG | WEIGHT: 175 LBS | HEIGHT: 65 IN | HEART RATE: 77 BPM

## 2025-03-20 DIAGNOSIS — I25.10 CORONARY ARTERY DISEASE INVOLVING NATIVE CORONARY ARTERY OF NATIVE HEART WITHOUT ANGINA PECTORIS: Primary | ICD-10-CM

## 2025-03-20 DIAGNOSIS — I35.0 NONRHEUMATIC AORTIC VALVE STENOSIS: ICD-10-CM

## 2025-03-20 DIAGNOSIS — I50.23 ACUTE ON CHRONIC SYSTOLIC HEART FAILURE: ICD-10-CM

## 2025-03-20 DIAGNOSIS — Z45.02 ICD (IMPLANTABLE CARDIOVERTER-DEFIBRILLATOR) DISCHARGE: ICD-10-CM

## 2025-03-20 DIAGNOSIS — I50.43 CHF (CONGESTIVE HEART FAILURE), NYHA CLASS I, ACUTE ON CHRONIC, COMBINED: ICD-10-CM

## 2025-03-20 DIAGNOSIS — I10 ESSENTIAL HYPERTENSION: ICD-10-CM

## 2025-03-20 LAB
ATRIAL RATE: 77 BPM
P AXIS: 60 DEGREES
P OFFSET: 150 MS
P ONSET: 86 MS
PR INTERVAL: 238 MS
Q ONSET: 205 MS
QRS COUNT: 13 BEATS
QRS DURATION: 120 MS
QT INTERVAL: 418 MS
QTC CALCULATION(BAZETT): 473 MS
QTC FREDERICIA: 454 MS
R AXIS: -29 DEGREES
T AXIS: 152 DEGREES
T OFFSET: 414 MS
VENTRICULAR RATE: 77 BPM

## 2025-03-20 PROCEDURE — 1036F TOBACCO NON-USER: CPT | Performed by: INTERNAL MEDICINE

## 2025-03-20 PROCEDURE — 3078F DIAST BP <80 MM HG: CPT | Performed by: INTERNAL MEDICINE

## 2025-03-20 PROCEDURE — 99215 OFFICE O/P EST HI 40 MIN: CPT | Performed by: INTERNAL MEDICINE

## 2025-03-20 PROCEDURE — 93005 ELECTROCARDIOGRAM TRACING: CPT | Performed by: INTERNAL MEDICINE

## 2025-03-20 PROCEDURE — G2211 COMPLEX E/M VISIT ADD ON: HCPCS | Performed by: INTERNAL MEDICINE

## 2025-03-20 PROCEDURE — 1159F MED LIST DOCD IN RCRD: CPT | Performed by: INTERNAL MEDICINE

## 2025-03-20 PROCEDURE — 3075F SYST BP GE 130 - 139MM HG: CPT | Performed by: INTERNAL MEDICINE

## 2025-03-20 RX ORDER — TORSEMIDE 100 MG/1
100 TABLET ORAL DAILY
Qty: 90 TABLET | Refills: 3 | Status: SHIPPED | OUTPATIENT
Start: 2025-03-20 | End: 2026-03-20

## 2025-03-20 RX ORDER — DEXTROMETHORPHAN HYDROBROMIDE, GUAIFENESIN 5; 100 MG/5ML; MG/5ML
650 LIQUID ORAL EVERY 8 HOURS PRN
COMMUNITY

## 2025-03-20 ASSESSMENT — ENCOUNTER SYMPTOMS
DEPRESSION: 0
LOSS OF SENSATION IN FEET: 0
OCCASIONAL FEELINGS OF UNSTEADINESS: 1

## 2025-03-20 NOTE — PROGRESS NOTES
"Chief Complaint:   Hospital Follow-up (Hospitalized 02- (ER visit) then admitted 02- (Urology issue)) and Follow-up     History Of Present Illness:    Tom Haas \"Briseyda" is a 79 y.o. male here for followup. He has a history of CAD by [facility] '05 (Lcx 80-90%, LAD 50-60%, RCA 40%), carotid stenosis, hypertension, hyperlipidemia, CKD (Cr 1.37 7/2020), & hydronephrosis. Had limited medical followup through the years. He was hospitalized 3/2024 with acute systolic HF and worsened CKD (stage IV-V). Low flow / low gradient aortic stenosis was also noted. Troponin peaked at > 4,000 (non-MI troponin elevation / acute non-traumatic myocardial injury). He is s/p ICD placement 2024 with an appropriate device discharge that year.    He reports doing well presently though had been suffering from marked dyspnea earlier this week. He had a CXR by his PCP and later a thoracentesis with 900 ml drained. He admits to ongoing leg swelling and some orthopnea with symptoms seemingly occurring after his recent hospitalization. He is pending an outpatient bladder procedure. He notes pedestrian urine output on his present regimen of furosemide.            Last Recorded Vitals:  Vitals:    03/20/25 1402   BP: 134/78   Pulse: 77   Weight: 79.4 kg (175 lb)   Height: 1.651 m (5' 5\")             Allergies:  Patient has no known allergies.    Outpatient Medications:  Current Outpatient Medications   Medication Instructions    acetaminophen (TYLENOL) 1,000 mg, Every 6 hours PRN    aspirin 81 mg, Daily    atorvastatin (LIPITOR) 20 mg, oral, Nightly    carvedilol (COREG) 6.25 mg, oral, 2 times daily    cholecalciferol (VITAMIN D-3) 2,000 Units, oral, Daily    ferrous sulfate 65 mg, oral, Daily with breakfast    furosemide (LASIX) 80 mg, oral, Daily    hydrALAZINE (APRESOLINE) 50 mg, oral, 2 times daily    isosorbide mononitrate ER (IMDUR) 30 mg, oral, Daily, Do not crush or chew.    sodium bicarbonate 325 mg tablet 2 times daily "         Physical Exam:  Gen Well appearing elderly male sitting up in NAD. Body mass index is 29.12 kg/m².   CV rrr. 2/6 JM. No JVD or leg edema.    Pulm Lungs clear with normal respiratory effort.  Neuro Alert and conversant. Grossly nonfocal.        I reviewed most recent imaging / labs / and office notes.    I reviewed the patient's ECG - NSR. First degree AV block. PVC's. LVH. Inferior infarct pattern. Left anterior fascicular block       Assessment/Plan   1.  Acute on chronic HFrEF  Volume+ Improved symptoms s/p thoracentesis. Will switch to torsemide given his inadequate urine output. Will consider BID dosing if his torsemide proves effective. Will consider added metolazone if not. His cardiomyopathy is NOS and was persistent despite medical therapy. He is status post ICD and suffered an appropriate device discharge in 2024. He has not had an ischemic evaluation with coronary angiography given his renal failure, and lack of ischemic symptoms. No plans for SGLT2-I/Aldactone/ACE/ARB/ARNI due to his significant renal dysfunction.      2. Hypertension   BP reasonably controlled and historically with home checks. His present regimen is to continue.     3. Coronary artery disease  Some obstructive disease noted '05 in the Cx and non-obstructive disease elsewhere. On indefinite aspirin / statin. Potential future coronary angiography if/when he is on dialysis.    4. Aortic stenosis  Low flow / low gradient and persistent by most recent TTE. No plans to pursue valve replacement given angiography would be part of the workup and would likely lead to IHD which we'd like to avoid if at all possible at this stage. Monitoring.      5. Preoperative cardiovascular risk assessment  Plan for HF optimization after which I feel he would be a candidate for general anesthesia/TURBT.      Follow-up 2 months.        Kelvin Murphy MD

## 2025-03-21 ENCOUNTER — APPOINTMENT (OUTPATIENT)
Dept: RADIOLOGY | Facility: HOSPITAL | Age: 80
End: 2025-03-21
Payer: MEDICARE

## 2025-04-01 ENCOUNTER — APPOINTMENT (OUTPATIENT)
Dept: ORTHOPEDIC SURGERY | Facility: CLINIC | Age: 80
End: 2025-04-01
Payer: MEDICARE

## 2025-04-02 ENCOUNTER — HOSPITAL ENCOUNTER (OUTPATIENT)
Dept: RADIOLOGY | Facility: HOSPITAL | Age: 80
Discharge: HOME | End: 2025-04-02
Payer: MEDICARE

## 2025-04-02 ENCOUNTER — OFFICE VISIT (OUTPATIENT)
Dept: ORTHOPEDIC SURGERY | Facility: HOSPITAL | Age: 80
End: 2025-04-02
Payer: MEDICARE

## 2025-04-02 VITALS — HEIGHT: 65 IN | BODY MASS INDEX: 30.16 KG/M2 | WEIGHT: 181 LBS

## 2025-04-02 DIAGNOSIS — M25.562 ACUTE BILATERAL KNEE PAIN: ICD-10-CM

## 2025-04-02 DIAGNOSIS — M25.561 ACUTE BILATERAL KNEE PAIN: Primary | ICD-10-CM

## 2025-04-02 DIAGNOSIS — M17.11 ARTHRITIS OF RIGHT KNEE: ICD-10-CM

## 2025-04-02 DIAGNOSIS — M17.12 PRIMARY OSTEOARTHRITIS OF LEFT KNEE: ICD-10-CM

## 2025-04-02 DIAGNOSIS — M25.562 ACUTE BILATERAL KNEE PAIN: Primary | ICD-10-CM

## 2025-04-02 DIAGNOSIS — M25.561 ACUTE BILATERAL KNEE PAIN: ICD-10-CM

## 2025-04-02 PROCEDURE — 2500000004 HC RX 250 GENERAL PHARMACY W/ HCPCS (ALT 636 FOR OP/ED): Performed by: STUDENT IN AN ORGANIZED HEALTH CARE EDUCATION/TRAINING PROGRAM

## 2025-04-02 PROCEDURE — 99215 OFFICE O/P EST HI 40 MIN: CPT | Mod: 50,25 | Performed by: STUDENT IN AN ORGANIZED HEALTH CARE EDUCATION/TRAINING PROGRAM

## 2025-04-02 PROCEDURE — 20610 DRAIN/INJ JOINT/BURSA W/O US: CPT | Mod: 50 | Performed by: STUDENT IN AN ORGANIZED HEALTH CARE EDUCATION/TRAINING PROGRAM

## 2025-04-02 PROCEDURE — 73564 X-RAY EXAM KNEE 4 OR MORE: CPT | Mod: 50

## 2025-04-02 RX ADMIN — BUPIVACAINE HYDROCHLORIDE 4 ML: 250 INJECTION, SOLUTION PERINEURAL at 10:41

## 2025-04-02 RX ADMIN — LIDOCAINE HYDROCHLORIDE 8 ML: 10 INJECTION, SOLUTION INFILTRATION; PERINEURAL at 10:41

## 2025-04-02 RX ADMIN — TRIAMCINOLONE ACETONIDE 40 MG: 40 INJECTION, SUSPENSION INTRA-ARTICULAR; INTRAMUSCULAR at 10:41

## 2025-04-02 NOTE — PROGRESS NOTES
Lilliana Vaca MD   Adult Reconstruction and Joint Replacement Surgery  Phone: [phone]     Fax: [phone]       Name: Tom Haas  Age: 79 y.o.   : 1945   Date of Visit: 2025    INITIAL CONSULTATION    CC: Left knee pain    Clinical History:  This patient presents with 10 years of LEFT knee pain.     Patient has tried the following Activity modification, Tylenol (arthritis dosing) , Corticosteroid injections , and Xray. Date of last steroid injection: 25 - 35% relief for 2-3 weeks. Patient does not have pain at night. Patient does not report falls related to this problem. Patient is able to walk indoors only. Patient is currently using cane, walker or wheelchair as assistive device. Primarily complains of anterior pain. Patient has difficulty with climbing stairs, walking , and walking on unlevel surfaces . The pain is significantly impacting their ability to perform activities of daily living. Patient reports no longer able to do activities such as walking, prolonged standing.     Focused History  PMH: Reviewed, Heart Disease / Heart Attack, PE/DVT: no, and Chronic Kidney Disease  PSH: Reviewed , Hip/Knee replacement: no, Hip/Knee surgery: no, Anesthesia complications: no, Spine surgery: no, Surgical infection: no, and Weight loss surgery: no  SHx: Reviewed, Occupation: unk, Current smoker: no, EtOH intake weekly: none, Social support: unk, and Preferred physical activities: unk  Jehovah´s Witness: no  Meds: Reviewed, Current Anticoagulants: no, Weight loss medication: no, and Current Opioids: no  Allergies: Reviewed  and The patient reports no contraindications or allergies to cephalosporins, aspirin, NSAIDs or opioids, except as noted above.  Dental Hx: Last routine cleaning: unk and All invasive dental work must be completed 3+ months prior to joint replacement surgery. Dental cleaning must be completed 6+ weeks prior to joint replacement surgery. Patient are to avoid any  invasive dental work 3-6 months post-surgically.   FH: No family history of any bleeding or clotting disorders.    PROMs/HISTORY  PROMs   No questionnaires on file.     Past Medical History:   Diagnosis Date    Anemia     Arrhythmia     Arthritis     Bladder cancer (Multi)     Cataract     CHF (congestive heart failure)     Chronic kidney disease     CKD (chronic kidney disease)     Coronary artery disease     Heart disease     Heart valve disease     Hyperlipidemia     Hypertension     Irregular heart beat     Joint pain     Myocardial infarction (Multi)     Occlusion and stenosis of unspecified carotid artery     Carotid atherosclerosis    Other conditions influencing health status     Coronary Artery Disease    Personal history of other diseases of the circulatory system     History of congestive heart disease    Personal history of other diseases of the circulatory system     History of hypertension    Personal history of other endocrine, nutritional and metabolic disease     History of hyperlipidemia    Thrombocytopenia (CMS-HCC)        Past Medical History:   Diagnosis Date    Anemia     Arrhythmia     Arthritis     Bladder cancer (Multi)     Cataract     CHF (congestive heart failure)     Chronic kidney disease     CKD (chronic kidney disease)     Coronary artery disease     Heart disease     Heart valve disease     Hyperlipidemia     Hypertension     Irregular heart beat     Joint pain     Myocardial infarction (Multi)     Occlusion and stenosis of unspecified carotid artery     Carotid atherosclerosis    Other conditions influencing health status     Coronary Artery Disease    Personal history of other diseases of the circulatory system     History of congestive heart disease    Personal history of other diseases of the circulatory system     History of hypertension    Personal history of other endocrine, nutritional and metabolic disease     History of hyperlipidemia    Thrombocytopenia (CMS-HCC)       Documented in chart and reviewed.     Past Surgical History:   Procedure Laterality Date    CARDIAC CATHETERIZATION      CARDIAC ELECTROPHYSIOLOGY PROCEDURE Left 10/01/2024    Procedure: ICD Dual Implant;  Surgeon: Saul Power MD;  Location: [place] [address] Cardiac Cath Lab;  Service: Electrophysiology;  Laterality: Left;  DC ICD SJM    CATARACT EXTRACTION      CATARACT EXTRACTION, BILATERAL      INSERT / REPLACE / REMOVE PACEMAKER         Allergies: He has No Known Allergies.     Medications:  Current Outpatient Medications   Medication Instructions    acetaminophen (TYLENOL 8 HOUR) 650 mg, oral, Every 8 hours PRN, Do not crush, chew, or split.    acetaminophen (TYLENOL) 1,000 mg, Every 6 hours PRN    aspirin 81 mg, Daily    atorvastatin (LIPITOR) 20 mg, oral, Nightly    carvedilol (COREG) 6.25 mg, oral, 2 times daily    cholecalciferol (VITAMIN D-3) 2,000 Units, oral, Daily    ferrous sulfate 65 mg, oral, Daily with breakfast    hydrALAZINE (APRESOLINE) 50 mg, oral, 2 times daily    isosorbide mononitrate ER (IMDUR) 30 mg, oral, Daily, Do not crush or chew.    sodium bicarbonate 325 mg tablet 2 times daily    torsemide (DEMADEX) 100 mg, oral, Daily       Family History   Problem Relation Name Age of Onset    Alcohol abuse Father           in his 50s related to complications of alcohol abuse    COPD Sister      Other (pacemaker) Sister       Documented in chart and reviewed.     Social History     Tobacco Use    Smoking status: Never    Smokeless tobacco: Never   Substance Use Topics    Alcohol use: Yes     Comment: on rare occasion        Review of Systems: Review of systems completed with medical assistant intake. Please refer to this note.     Physical Exam:  BMI: 29.    General: The patient is well appearing and has an appropriate affect.     Neurological Examination: SILT in SPN/DPN/Sural/Saphenous/Tibial nerves. 5/5 EHL, FHL, Tibial anterior, Gastrocnemius. Coordination grossly intact.      Cardiovascular Exam: Capillary refill <2 seconds.     Lymphatic Examination: Swelling of bilateral lower extremities.     Skin Exam: Skin around the pertinent joint is without evidence of infection or rash.    Gait: The patient ambulates with an antalgic gait.     Right Hip Examination:    There is no tenderness over the greater trochanter.    Range of motion is full extension to 90 degrees of flexion.    The hip is stable without subluxation or dislocation.    The hip internally rotates to 15 degrees and externally rotates to 35 degrees.    There is no pain with hip motion.    Left Hip Examination:    There is no tenderness over the greater trochanter.    Range of motion is full extension to 90 degrees of flexion.    The hip is stable without subluxation or dislocation.    The hip internally rotates to 15 degrees and externally rotates to 35 degrees.    There is no pain with hip motion.    Left Knee Examination:  Examination of the left knee reveals the skin to be intact.    There is a moderate effusion in the knee.    The alignment of the knee is Varus.    This deformity is not correctable.    There is tenderness to palpation over the joint line.    There is significant quadriceps atrophy.    Range of Motion: 10 to 110 degrees of flexion.    The knee is stable to varus-valgus stress and anterior-posterior stress.     There is moderate grinding with range of motion.    There is mild patellofemoral crepitus.    Right Knee Examination:  Examination of the right knee reveals the skin to be intact.     There is no obvious swelling.    There is a no effusion in the knee.     The alignment of the knee is normal.    There is no tenderness to palpation over the joint line.    There is no significant quadriceps atrophy.    Range of motion is full extension to 120 degrees of flexion.    The knee is stable to varus-valgus stress and anterior-posterior stress.     There is moderate grinding with range of motion.    There  "is mild patellofemoral crepitus.    Prior Labs:   Prior Labs:   Lab Results   Component Value Date    WBC 6.8 02/11/2025    HGB 9.8 (L) 02/11/2025    HCT 29.7 (L) 02/11/2025    MCV 90 02/11/2025    PLT 93 (L) 02/11/2025      Lab Results   Component Value Date    INR 1.2 (H) 03/11/2024    PROTIME 13.8 (H) 03/11/2024         Lab Results   Component Value Date    GLUCOSE 94 02/17/2025    CALCIUM 9.1 02/17/2025     (L) 02/17/2025    K 4.4 02/17/2025    CO2 22 02/17/2025    CL 96 (L) 02/17/2025    BUN 87 (H) 02/17/2025    CREATININE 3.61 (H) 02/17/2025      No results found for: \"CKTOTAL\", \"CKMB\", \"CKMBINDEX\", \"TROPONINI\"   No results found for: \"HGBA1C\"      No results found for: \"CRP\"   No results found for: \"SEDRATE\"      Radiographs:  Radiographs were personally reviewed today. There is evidence of severe LEFT knee osteoarthritis with MEDIAL bone on bone apposition.    Impression:  This patient presents with severe LEFT knee osteoarthritis with bone on bone apposition.     Diagnosis:  Primary osteoarthritis of left knee    Acute bilateral knee pain     Recommendations / Plan:    I have discussed the options in detail with the patient. We have discussed anti-inflammatory medication, activity modification, physical therapy, corticosteroid injections, viscosupplementation injections, partial knee replacement surgery and total knee replacement surgery.  The patient is a poor candidate for surgery given his heart failure with reduced ejection fraction.    The risks and benefits of all these treatment options have been discussed in detail.     The patient has tried at least 3 months of the above conservative treatments and continues to have disabling pain, impaired activities of daily living and worsened quality of life.  Reviewed the surgical optimization steps to optimize their chances for a successful joint replacement surgery.      Currently their BMI is 30.  Discussed that obesity is a risk factor for continued " progression of osteoarthritis. Each pound of weight loss offloads their hip and knee joints by 3-6 pounds.  The most effective of these options is weight loss mainly through restricting caloric intake.     A physical therapy prescription was ordered for the patient.  Patient will continue their home exercise program. Strategies for pain management using over-the-counter anti-inflammatory medications reviewed.  The patient elects for a steroid injection, which was provided according to procedure note below.  The patient is interested in trying hyaluronic acid type injections on the next round.  He was given a referral to my sports medicine colleague for consideration.  Discussed utility of brace. Will defer brace at this time.. Encouraged them to maintain range of motion and strength around the knee joints.  They will continue to implement these strategies in addressing their pain.      Recommend the patient continue optimizing nonsurgical treatment interventions as outlined above for management of their knee arthritis.  I would be happy to see them again at any point to discuss surgery if indicated or they are more optimized or to review progress with nonsurgical treatment of arthritis. The patient verbalizes understanding with the recommendations and treatment plan as outlined above and is in agreement.  Questions were addressed.    Large Joint Injection 14939: Knee  Consent given by: Patient  Timeout: Immediately prior to procedure the correct patient, procedure, and site was verified. Sterile gloves and semi-sterile technique were used.   Indications: Knee pain and joint swelling.   Location: BILATERAL knee  Needle size: 22 G  Approach: Lateral    Medications administered: please see medical assistant note for lot number and expiration date.   Subcutaneous   4 ml of 1% lidocaine     Deep   4 ml of 1% lidocaine   4 mL 0.5% marcaine   1 mL of 40 mg kenalog     Patient tolerance: Dressing applied. Patient tolerated  the procedure well with no immediate complications.    L Inj/Asp: bilateral knee on 4/2/2025 10:41 AM  Indications: pain and joint swelling  Details: 22 G needle, lateral approach  Medications (Right): 40 mg triamcinolone acetonide 40 mg/mL; 8 mL lidocaine 10 mg/mL (1 %); 4 mL BUPivacaine HCl 0.5 % (5 mg/mL)  Medications (Left): 40 mg triamcinolone acetonide 40 mg/mL; 8 mL lidocaine 10 mg/mL (1 %); 4 mL BUPivacaine HCl 0.5 % (5 mg/mL)  Outcome: tolerated well, no immediate complications  Procedure, treatment alternatives, risks and benefits explained, specific risks discussed. Consent was given by the patient. Immediately prior to procedure a time out was called to verify the correct patient, procedure, equipment, support staff and site/side marked as required. Patient was prepped and draped in the usual sterile fashion.         _____________  Lilliana Vaca MD   Attending Orthopaedic Surgeon  [facility]    [facility]     This office note was transcribed with dictation software.  Please excuse any typographical errors, program misunderstandings leading to inadvertent insertions or deletions of inappropriate wording, pronoun errors and other unintentional transcription errors not noticed on proof-reading.

## 2025-04-03 ENCOUNTER — TELEPHONE (OUTPATIENT)
Dept: PRIMARY CARE | Facility: CLINIC | Age: 80
End: 2025-04-03
Payer: MEDICARE

## 2025-04-03 NOTE — TELEPHONE ENCOUNTER
Patients wife called requesting a written order for a stair lift.  Patient is no longer able to do the stairs due to knee problems and congestive heart failure.

## 2025-04-04 RX ORDER — BUPIVACAINE HYDROCHLORIDE 5 MG/ML
4 INJECTION, SOLUTION PERINEURAL
Status: COMPLETED | OUTPATIENT
Start: 2025-04-02 | End: 2025-04-02

## 2025-04-04 RX ORDER — LIDOCAINE HYDROCHLORIDE 10 MG/ML
8 INJECTION, SOLUTION INFILTRATION; PERINEURAL
Status: COMPLETED | OUTPATIENT
Start: 2025-04-02 | End: 2025-04-02

## 2025-04-04 RX ORDER — TRIAMCINOLONE ACETONIDE 40 MG/ML
40 INJECTION, SUSPENSION INTRA-ARTICULAR; INTRAMUSCULAR
Status: COMPLETED | OUTPATIENT
Start: 2025-04-02 | End: 2025-04-02

## 2025-04-09 NOTE — PROGRESS NOTES
** Please excuse any errors in grammar or translation related to this dictation. Voice recognition software was utilized to prepare this document. **    Assessment & Plan:  Clinical presentation is most consistent with advanced bilateral knee arthritis.  Given his multiple medical comorbidities to include coronary disease and end-stage CKD, patient was referred for ongoing nonoperative management.    Discuss with patient the nature of this disease and how it can be progressive.  Discuss how symptoms can wax and wane in severity. Management options reviewed below.    - Maintaining a healthy weight: Every pound of bodyweight is about 4-5 pounds through the lower extremity.   - Exercise program to improve quad & hamstring strength to support joint.  Patient recently referred by Dr. Vaca.  - Activity modifications as needed to include use of cane/walker or braces.  Using cane currently.  Uses hinged knee brace on left knee.  - Analgesics to include but not limited to Tylenol up to 3g daily.  Unable to use NSAIDs due to CKD.  - Steroid injection.  Recently had completed by Dr. Vaca and reports much improvement.  - Hyaluronic acid injection.  We discussed how hyaluronic acid injections differ from steroid injections.  Patient has no contraindications to this particular injection.  Start prior auth for consideration for use at later date and can follow-up for completion at his discretion.  - Referral to arthroplastic surgeon for potential joint replacement.  Can follow-up with Dr. Vaca if nonoperative measures are ineffective.   All questions answered and patient is agreeable to this plan.       Chief complaint:  Bilateral knee pain    HPI:  80 y/o patient, hx of bilateral knee arthritis, NSTEMI, CHF, end-stage CKD, presents with bilateral knee pain.  Left is more painful.  This complaint has been ongoing for several months.  No mechanism of injury reported at onset. Symptoms have progressively worsened with  time.  Pain is most prominent at anterior knee.  Symptoms are aggravated by daily activities, walking distance. Treatment to date includes cortisone injections, knee brace on left, cane, tylenol, voltaren gel. Previously saw Dr. Vaca and Dr. Arredondo for this with last visit being 4/2/25. Last steroid injection completed 4/2/25.  He reports steroid injection in the right knee resolved nearly all of his pain and a reduced pain symptom is left by 80%.  Denies previous surgery to this site.       Patient Active Problem List   Diagnosis    Non-STEMI (non-ST elevated myocardial infarction) (Multi)    Essential hypertension    Pneumonia due to infectious organism    CHF (congestive heart failure), NYHA class I, acute on chronic, combined    Chronic kidney disease (CKD), stage IV (severe) (Multi)    Hypertension secondary to other renal disorders    Pure hypercholesterolemia    Overweight with body mass index (BMI) of 27 to 27.9 in adult    Nonrheumatic aortic valve stenosis    Coronary artery disease involving native coronary artery of native heart without angina pectoris    Acute on chronic systolic heart failure    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, with heart failure    Acute cystitis with hematuria    ICD (implantable cardioverter-defibrillator) discharge    Gross hematuria    UTI (urinary tract infection)    Ambulates with cane     Past Surgical History:   Procedure Laterality Date    CARDIAC CATHETERIZATION      CARDIAC ELECTROPHYSIOLOGY PROCEDURE Left 10/01/2024    Procedure: ICD Dual Implant;  Surgeon: Saul Power MD;  Location: [place] [address] Cardiac Cath Lab;  Service: Electrophysiology;  Laterality: Left;  DC ICD SJM    CATARACT EXTRACTION      CATARACT EXTRACTION, BILATERAL      INSERT / REPLACE / REMOVE PACEMAKER       Current Outpatient Medications on File Prior to Visit   Medication Sig Dispense Refill    acetaminophen (Tylenol 8 HOUR) 650 mg ER tablet Take 1 tablet (650 mg) by mouth every 8 hours  if needed for mild pain (1 - 3). Do not crush, chew, or split.      acetaminophen (Tylenol) 500 mg tablet Take 2 tablets (1,000 mg) by mouth every 6 hours if needed for mild pain (1 - 3).      aspirin 81 mg EC tablet Take 1 tablet (81 mg) by mouth once daily. Last dose2/9/25      atorvastatin (Lipitor) 20 mg tablet Take 1 tablet (20 mg) by mouth once daily at bedtime. 90 tablet 3    carvedilol (Coreg) 6.25 mg tablet Take 1 tablet (6.25 mg) by mouth 2 times a day. 180 tablet 3    cholecalciferol (Vitamin D-3) 50 MCG (2000 UT) tablet Take 1 tablet (2,000 Units) by mouth once daily. Do not start before March 14, 2024. 30 tablet 1    ferrous sulfate 324 mg (65 mg elemental iron) EC tablet (delayed release) Take 1 tablet (65 mg) by mouth once daily with breakfast. 60 tablet 3    hydrALAZINE (Apresoline) 50 mg tablet Take 1 tablet (50 mg) by mouth 2 times a day. 180 tablet 3    isosorbide mononitrate ER (Imdur) 30 mg 24 hr tablet Take 1 tablet (30 mg) by mouth once daily. Do not crush or chew. 90 tablet 3    oxybutynin XL (Ditropan-XL) 5 mg 24 hr tablet Take 1 tablet (5 mg) by mouth once daily. Do not crush, chew, or split. 30 tablet 0    sodium bicarbonate 325 mg tablet Take by mouth 2 times a day. Half a tab 2 x day      torsemide (Demadex) 100 mg tablet Take 1 tablet (100 mg) by mouth once daily. 90 tablet 3     No current facility-administered medications on file prior to visit.       Exam:  General Exam:  Constitutional - NAD, AAO x 3, conversing appropriately.  HEENT- Normocephalic and atraumatic. No facial deformities. Hearing grossly normal.  Lungs - Breathing non-labored with normal rate. No accessory muscle use.  CV - Extremities warm and well-perfused, brisk capillary refill present.   Neuro - CN II-XII grossly intact.    LEFT Knee examined. AROM from 10 to 110 deg with 5/5 strength. SILT overlying knee. Retropatellar crepitus present. Tenderness along medial joint line.   No popliteal mass palpated. Negative  anterior and posterior drawer.  No laxity to varus or valgus stress at 0 or 30 deg.  No patellar apprehension.      RIGHT Knee examined. AROM from 0 to 120 deg with 5/5 strength. SILT overlying knee. Retropatellar crepitus present. Non-TTP along medial and lateral joint lines.   No popliteal mass palpated. Negative anterior and posterior drawer.  No laxity to varus or valgus stress at 0 or 30 deg.  No patellar apprehension.      Results:  Xrays of bilateral hands obtained April 2, 2025 personally interpreted as advanced left greater than right medial compartment degenerative changes with joint space narrowing, osteophyte formation, and subchondral sclerosis.  Varus alignment.    Lab Results   Component Value Date    CREATININE 3.61 (H) 02/17/2025    EGFR 16 (L) 02/17/2025

## 2025-04-12 NOTE — ED PROCEDURE NOTE
Procedure  Critical Care    Performed by: Zulay Jackson MD  Authorized by: Zulay Jackson MD    Critical care provider statement:     Critical care time (minutes):  15    Critical care time was exclusive of:  Separately billable procedures and treating other patients    Critical care was necessary to treat or prevent imminent or life-threatening deterioration of the following conditions:  Circulatory failure, cardiac failure and respiratory failure    Critical care was time spent personally by me on the following activities:  Development of treatment plan with patient or surrogate, examination of patient, obtaining history from patient or surrogate, ordering and performing treatments and interventions, re-evaluation of patient's condition and review of old charts               Zulay Jackson MD  04/12/25 0941

## 2025-04-12 NOTE — ED PROVIDER NOTES
HPI   Chief Complaint   Patient presents with    Cardiac Arrest     LKW 0700- EMS call 0728 for cardiac arrest. Pt arrived with mechanical compressions and mechanical ventilation in place.       79-year-old male presents in cardiac arrest.  Last seen at 7 AM by his daughter when she came downstairs to go to work he was sitting on the couch watching television.  Wife came down around 720 or so and he was slumped over unresponsive.  EMS was called report no bystander CPR.  EMS provides history they gave 5 epi and route.  Patient has history of pacemaker/defibrillator, chronic kidney disease, CHF.  No other known history was in his usual state of health.  EMS response time approximately 6 minutes to the home where they did start CPR which has been in progress for approximately 30 minutes.      History provided by:  Medical records, relative and EMS personnel          Patient History   Past Medical History:   Diagnosis Date    Anemia     Arrhythmia     Arthritis     Bladder cancer (Multi)     Cataract     CHF (congestive heart failure)     Chronic kidney disease     CKD (chronic kidney disease)     Coronary artery disease     Heart disease     Heart valve disease     Hyperlipidemia     Hypertension     Irregular heart beat     Joint pain     Myocardial infarction (Multi)     Occlusion and stenosis of unspecified carotid artery     Carotid atherosclerosis    Other conditions influencing health status     Coronary Artery Disease    Personal history of other diseases of the circulatory system     History of congestive heart disease    Personal history of other diseases of the circulatory system     History of hypertension    Personal history of other endocrine, nutritional and metabolic disease     History of hyperlipidemia    Thrombocytopenia (CMS-HCC)      Past Surgical History:   Procedure Laterality Date    CARDIAC CATHETERIZATION      CARDIAC ELECTROPHYSIOLOGY PROCEDURE Left 10/01/2024    Procedure: ICD Dual Implant;   Surgeon: Saul Power MD;  Location: [place] [id number] [facility];  Service: Electrophysiology;  Laterality: Left;  DC ICD SJM    CATARACT EXTRACTION      CATARACT EXTRACTION, BILATERAL      INSERT / REPLACE / REMOVE PACEMAKER       Family History   Problem Relation Name Age of Onset    Alcohol abuse Father           in his 50s related to complications of alcohol abuse    COPD Sister      Other (pacemaker) Sister       Social History     Tobacco Use    Smoking status: Never    Smokeless tobacco: Never   Vaping Use    Vaping status: Never Used   Substance Use Topics    Alcohol use: Yes     Comment: on rare occasion    Drug use: Never       Physical Exam   ED Triage Vitals   Temp Pulse Resp BP   -- -- -- --      SpO2 Temp src Heart Rate Source Patient Position   -- -- -- --      BP Location FiO2 (%)     -- --       Physical Exam  Vitals and nursing note reviewed.     General: Vitals reviewed.  Unresponsive  HEENT: NCAT, MMM, supraglottic airway device in place with significant blood in ET tube and coming from mouth and nose, pupils 7 mm and fixed/nonreactive  Neck: Supple, trachea midline  Respiratory: No respiratory effort, coarse breath sounds with bagging bilaterally, pacemaker defibrillator in left upper chest wall  CV:  Cardiopulmonary resuscitation in progress, good palpable pulse with cardiopulmonary resuscitation  Abdomen/GI: Soft, non-distended  Extremities:  No deformities, no fistulas noted  Neuro:  Unresponsive, no withdrawal to pain  Skin: Cool distal extremities, dry. No rashes identified      ED Course & MDM   ED Course as of 25 0940   Sat 2025   0835 Family has been updated in consultation room [DAVID]   0843 Discussed with Moon, will release [DAVID]      ED Course User Index  [DAVID] Zulay Jackson MD         Diagnoses as of 25 0940   Cardiac arrest                 No data recorded     Tracy Coma Scale Score: 3 (25 0807 : Jadyn Lozada, LEÓN)                            Medical Decision Making  79-year-old male presents in cardiac arrest.  He is being bagged over a supraglottic airway.  Uncertain exact downtime but was found approximately 20 to 30 minutes after his last known well and had at least 6 additional minutes of downtime without bystander CPR prior to EMS arrival.  PEA throughout.  ACLS protocols were continued and given history of chronic kidney disease patient was given additional calcium in consideration of hyperkalemia.  Suspect more likely significant cardiac event.  He does have pacemaker defibrillator which did shocked him several times while CPR was in progress.  See code sheet for further details but after several additional rounds of epinephrine, compressions bedside cardiac ultrasound performed by myself showing no organized cardiac activity.  There continues to be pacemaker spiking on the monitor with PEA.  At this time given prolonged downtime and lack of organized cardiac activity further resuscitative efforts would be futile and patient was pronounced dead at 0818.  Magnet placed over AICD to pause the defibrillator component.    Amount and/or Complexity of Data Reviewed  Independent Historian: spouse and EMS        Procedure  Procedures     Zulay Jackson MD  04/12/25 [id number]

## 2025-04-30 ENCOUNTER — APPOINTMENT (OUTPATIENT)
Dept: PRIMARY CARE | Facility: CLINIC | Age: 80
End: 2025-04-30
Payer: MEDICARE

## 2025-05-05 ENCOUNTER — APPOINTMENT (OUTPATIENT)
Dept: PRIMARY CARE | Facility: CLINIC | Age: 80
End: 2025-05-05
Payer: MEDICARE

## 2025-05-07 ENCOUNTER — APPOINTMENT (OUTPATIENT)
Dept: ORTHOPEDIC SURGERY | Facility: CLINIC | Age: 80
End: 2025-05-07
Payer: MEDICARE

## 2025-05-08 ENCOUNTER — APPOINTMENT (OUTPATIENT)
Dept: CARDIOLOGY | Facility: HOSPITAL | Age: 80
End: 2025-05-08
Payer: MEDICARE

## 2025-07-24 ENCOUNTER — APPOINTMENT (OUTPATIENT)
Dept: CARDIOLOGY | Facility: HOSPITAL | Age: 80
End: 2025-07-24
Payer: MEDICARE

## (undated) DEVICE — PAD, ELECTRODE DEFIB PADPRO ADULT STRL W/ADAPTER

## (undated) DEVICE — CABLE, SURGICAL, SM CLIP

## (undated) DEVICE — INTRODUCER SYSTEM, PRELUDE SNAP, SPLITTABLE, HEMOSTATIC, 7FR

## (undated) DEVICE — INTRODUCER SYSTEM, PRELUDE SNAP, SPLITTABLE, HEMOSTATIC, 6FR